# Patient Record
Sex: MALE | Race: WHITE | NOT HISPANIC OR LATINO | Employment: FULL TIME | URBAN - METROPOLITAN AREA
[De-identification: names, ages, dates, MRNs, and addresses within clinical notes are randomized per-mention and may not be internally consistent; named-entity substitution may affect disease eponyms.]

---

## 2017-05-08 ENCOUNTER — RECORDS - HEALTHEAST (OUTPATIENT)
Dept: LAB | Facility: CLINIC | Age: 47
End: 2017-05-08

## 2017-05-08 LAB
CHOLEST SERPL-MCNC: 181 MG/DL
FASTING STATUS PATIENT QL REPORTED: YES
HDLC SERPL-MCNC: 37 MG/DL
LDLC SERPL CALC-MCNC: 115 MG/DL
TRIGL SERPL-MCNC: 143 MG/DL

## 2017-07-31 ENCOUNTER — SURGERY - HEALTHEAST (OUTPATIENT)
Dept: CARDIOLOGY | Facility: CLINIC | Age: 47
End: 2017-07-31

## 2017-07-31 ASSESSMENT — MIFFLIN-ST. JEOR
SCORE: 1963.49
SCORE: 1956.69

## 2017-08-01 ASSESSMENT — MIFFLIN-ST. JEOR: SCORE: 1954.87

## 2017-08-07 ENCOUNTER — AMBULATORY - HEALTHEAST (OUTPATIENT)
Dept: CARDIAC REHAB | Facility: HOSPITAL | Age: 47
End: 2017-08-07

## 2017-08-07 DIAGNOSIS — Z95.5 S/P CORONARY ARTERY STENT PLACEMENT: ICD-10-CM

## 2017-08-07 DIAGNOSIS — I20.0 UNSTABLE ANGINA (H): ICD-10-CM

## 2017-08-07 ASSESSMENT — MIFFLIN-ST. JEOR: SCORE: 1958.95

## 2017-08-09 ENCOUNTER — AMBULATORY - HEALTHEAST (OUTPATIENT)
Dept: CARDIAC REHAB | Facility: HOSPITAL | Age: 47
End: 2017-08-09

## 2017-08-09 DIAGNOSIS — Z95.5 S/P CORONARY ARTERY STENT PLACEMENT: ICD-10-CM

## 2017-08-14 ENCOUNTER — AMBULATORY - HEALTHEAST (OUTPATIENT)
Dept: CARDIAC REHAB | Facility: HOSPITAL | Age: 47
End: 2017-08-14

## 2017-08-14 DIAGNOSIS — Z95.5 S/P CORONARY ARTERY STENT PLACEMENT: ICD-10-CM

## 2017-08-16 ENCOUNTER — AMBULATORY - HEALTHEAST (OUTPATIENT)
Dept: CARDIAC REHAB | Facility: HOSPITAL | Age: 47
End: 2017-08-16

## 2017-08-16 DIAGNOSIS — I20.0 UNSTABLE ANGINA (H): ICD-10-CM

## 2017-08-16 DIAGNOSIS — Z95.5 S/P CORONARY ARTERY STENT PLACEMENT: ICD-10-CM

## 2017-08-18 ENCOUNTER — OFFICE VISIT - HEALTHEAST (OUTPATIENT)
Dept: CARDIOLOGY | Facility: CLINIC | Age: 47
End: 2017-08-18

## 2017-08-18 DIAGNOSIS — E78.5 DYSLIPIDEMIA: ICD-10-CM

## 2017-08-18 DIAGNOSIS — I25.10 CORONARY ARTERY DISEASE: ICD-10-CM

## 2017-08-18 ASSESSMENT — MIFFLIN-ST. JEOR: SCORE: 1940.81

## 2017-08-21 ENCOUNTER — AMBULATORY - HEALTHEAST (OUTPATIENT)
Dept: CARDIAC REHAB | Facility: HOSPITAL | Age: 47
End: 2017-08-21

## 2017-08-21 DIAGNOSIS — Z95.5 S/P CORONARY ARTERY STENT PLACEMENT: ICD-10-CM

## 2017-08-28 ENCOUNTER — AMBULATORY - HEALTHEAST (OUTPATIENT)
Dept: CARDIAC REHAB | Facility: HOSPITAL | Age: 47
End: 2017-08-28

## 2017-08-28 DIAGNOSIS — Z95.5 S/P CORONARY ARTERY STENT PLACEMENT: ICD-10-CM

## 2017-08-30 ENCOUNTER — AMBULATORY - HEALTHEAST (OUTPATIENT)
Dept: CARDIAC REHAB | Facility: HOSPITAL | Age: 47
End: 2017-08-30

## 2017-08-30 DIAGNOSIS — Z95.5 S/P CORONARY ARTERY STENT PLACEMENT: ICD-10-CM

## 2017-09-06 ENCOUNTER — AMBULATORY - HEALTHEAST (OUTPATIENT)
Dept: CARDIAC REHAB | Facility: HOSPITAL | Age: 47
End: 2017-09-06

## 2017-09-06 DIAGNOSIS — Z95.5 S/P CORONARY ARTERY STENT PLACEMENT: ICD-10-CM

## 2017-09-07 ENCOUNTER — RECORDS - HEALTHEAST (OUTPATIENT)
Dept: LAB | Facility: CLINIC | Age: 47
End: 2017-09-07

## 2017-09-07 LAB
CHOLEST SERPL-MCNC: 112 MG/DL
FASTING STATUS PATIENT QL REPORTED: YES
HDLC SERPL-MCNC: 31 MG/DL
LDLC SERPL CALC-MCNC: 63 MG/DL
TRIGL SERPL-MCNC: 88 MG/DL

## 2017-09-08 ENCOUNTER — AMBULATORY - HEALTHEAST (OUTPATIENT)
Dept: CARDIAC REHAB | Facility: HOSPITAL | Age: 47
End: 2017-09-08

## 2017-09-08 DIAGNOSIS — Z95.5 STENTED CORONARY ARTERY: ICD-10-CM

## 2017-09-11 ENCOUNTER — AMBULATORY - HEALTHEAST (OUTPATIENT)
Dept: CARDIAC REHAB | Facility: HOSPITAL | Age: 47
End: 2017-09-11

## 2017-09-11 DIAGNOSIS — Z95.5 STENTED CORONARY ARTERY: ICD-10-CM

## 2017-09-11 DIAGNOSIS — I20.0 UNSTABLE ANGINA (H): ICD-10-CM

## 2017-09-13 ENCOUNTER — AMBULATORY - HEALTHEAST (OUTPATIENT)
Dept: CARDIAC REHAB | Facility: HOSPITAL | Age: 47
End: 2017-09-13

## 2017-09-13 DIAGNOSIS — Z95.5 STENTED CORONARY ARTERY: ICD-10-CM

## 2017-09-18 ENCOUNTER — AMBULATORY - HEALTHEAST (OUTPATIENT)
Dept: CARDIAC REHAB | Facility: HOSPITAL | Age: 47
End: 2017-09-18

## 2017-09-18 DIAGNOSIS — Z95.5 STENTED CORONARY ARTERY: ICD-10-CM

## 2017-09-25 ENCOUNTER — AMBULATORY - HEALTHEAST (OUTPATIENT)
Dept: CARDIAC REHAB | Facility: HOSPITAL | Age: 47
End: 2017-09-25

## 2017-09-25 DIAGNOSIS — Z95.5 STENTED CORONARY ARTERY: ICD-10-CM

## 2017-09-27 ENCOUNTER — AMBULATORY - HEALTHEAST (OUTPATIENT)
Dept: CARDIAC REHAB | Facility: HOSPITAL | Age: 47
End: 2017-09-27

## 2017-09-27 ENCOUNTER — AMBULATORY - HEALTHEAST (OUTPATIENT)
Dept: CARDIOLOGY | Facility: CLINIC | Age: 47
End: 2017-09-27

## 2017-09-27 ENCOUNTER — RECORDS - HEALTHEAST (OUTPATIENT)
Dept: ADMINISTRATIVE | Facility: OTHER | Age: 47
End: 2017-09-27

## 2017-09-27 DIAGNOSIS — Z95.5 STENTED CORONARY ARTERY: ICD-10-CM

## 2017-10-02 ENCOUNTER — AMBULATORY - HEALTHEAST (OUTPATIENT)
Dept: CARDIAC REHAB | Facility: HOSPITAL | Age: 47
End: 2017-10-02

## 2017-10-02 ENCOUNTER — OFFICE VISIT - HEALTHEAST (OUTPATIENT)
Dept: CARDIOLOGY | Facility: CLINIC | Age: 47
End: 2017-10-02

## 2017-10-02 DIAGNOSIS — R00.1 SINUS BRADYCARDIA: ICD-10-CM

## 2017-10-02 DIAGNOSIS — Q23.81 BICUSPID AORTIC VALVE: ICD-10-CM

## 2017-10-02 DIAGNOSIS — I25.10 CORONARY ARTERY DISEASE DUE TO LIPID RICH PLAQUE: ICD-10-CM

## 2017-10-02 DIAGNOSIS — K21.9 GASTROESOPHAGEAL REFLUX DISEASE WITHOUT ESOPHAGITIS: ICD-10-CM

## 2017-10-02 DIAGNOSIS — E78.5 DYSLIPIDEMIA: ICD-10-CM

## 2017-10-02 DIAGNOSIS — Z95.5 STENTED CORONARY ARTERY: ICD-10-CM

## 2017-10-02 DIAGNOSIS — I25.83 CORONARY ARTERY DISEASE DUE TO LIPID RICH PLAQUE: ICD-10-CM

## 2017-10-02 ASSESSMENT — MIFFLIN-ST. JEOR: SCORE: 1913.6

## 2017-10-04 ENCOUNTER — AMBULATORY - HEALTHEAST (OUTPATIENT)
Dept: CARDIAC REHAB | Facility: HOSPITAL | Age: 47
End: 2017-10-04

## 2017-10-04 DIAGNOSIS — Z95.5 STENTED CORONARY ARTERY: ICD-10-CM

## 2017-10-09 ENCOUNTER — AMBULATORY - HEALTHEAST (OUTPATIENT)
Dept: CARDIAC REHAB | Facility: HOSPITAL | Age: 47
End: 2017-10-09

## 2017-10-09 DIAGNOSIS — Z95.5 STENTED CORONARY ARTERY: ICD-10-CM

## 2017-10-09 DIAGNOSIS — I20.0 UNSTABLE ANGINA (H): ICD-10-CM

## 2017-10-16 ENCOUNTER — HOSPITAL ENCOUNTER (OUTPATIENT)
Dept: CARDIOLOGY | Facility: HOSPITAL | Age: 47
Discharge: HOME | End: 2017-10-16
Attending: INTERNAL MEDICINE

## 2017-10-16 ENCOUNTER — HOSPITAL ENCOUNTER (OUTPATIENT)
Dept: NUCLEAR MEDICINE | Facility: HOSPITAL | Age: 47
Discharge: HOME | End: 2017-10-16
Attending: INTERNAL MEDICINE

## 2017-10-16 ENCOUNTER — AMBULATORY - HEALTHEAST (OUTPATIENT)
Dept: CARDIAC REHAB | Facility: HOSPITAL | Age: 47
End: 2017-10-16

## 2017-10-16 DIAGNOSIS — I25.10 CORONARY ARTERY DISEASE DUE TO LIPID RICH PLAQUE: ICD-10-CM

## 2017-10-16 DIAGNOSIS — Z95.5 STENTED CORONARY ARTERY: ICD-10-CM

## 2017-10-16 DIAGNOSIS — I25.83 CORONARY ARTERY DISEASE DUE TO LIPID RICH PLAQUE: ICD-10-CM

## 2017-10-16 LAB
CV STRESS CURRENT BP HE: NORMAL
CV STRESS CURRENT HR HE: 100
CV STRESS CURRENT HR HE: 100
CV STRESS CURRENT HR HE: 102
CV STRESS CURRENT HR HE: 103
CV STRESS CURRENT HR HE: 110
CV STRESS CURRENT HR HE: 114
CV STRESS CURRENT HR HE: 118
CV STRESS CURRENT HR HE: 118
CV STRESS CURRENT HR HE: 119
CV STRESS CURRENT HR HE: 132
CV STRESS CURRENT HR HE: 133
CV STRESS CURRENT HR HE: 135
CV STRESS CURRENT HR HE: 145
CV STRESS CURRENT HR HE: 145
CV STRESS CURRENT HR HE: 146
CV STRESS CURRENT HR HE: 149
CV STRESS CURRENT HR HE: 152
CV STRESS CURRENT HR HE: 153
CV STRESS CURRENT HR HE: 153
CV STRESS CURRENT HR HE: 154
CV STRESS CURRENT HR HE: 78
CV STRESS CURRENT HR HE: 79
CV STRESS CURRENT HR HE: 89
CV STRESS CURRENT HR HE: 92
CV STRESS CURRENT HR HE: 93
CV STRESS CURRENT HR HE: 95
CV STRESS CURRENT HR HE: 96
CV STRESS CURRENT HR HE: 96
CV STRESS CURRENT HR HE: 98
CV STRESS DEVIATION TIME HE: NORMAL
CV STRESS ECHO PERCENT HR HE: NORMAL
CV STRESS EXERCISE STAGE HE: NORMAL
CV STRESS EXERCISE STAGE REACHED HE: NORMAL
CV STRESS FINAL RESTING BP HE: NORMAL
CV STRESS FINAL RESTING HR HE: 93
CV STRESS MAX HR HE: 154
CV STRESS MAX TREADMILL GRADE HE: 16
CV STRESS MAX TREADMILL SPEED HE: 4.2
CV STRESS PEAK DIA BP HE: NORMAL
CV STRESS PEAK SYS BP HE: NORMAL
CV STRESS PHASE HE: NORMAL
CV STRESS PROTOCOL HE: NORMAL
CV STRESS RESTING PT POSITION HE: NORMAL
CV STRESS RESTING PT POSITION HE: NORMAL
CV STRESS ST DEVIATION AMOUNT HE: NORMAL
CV STRESS ST DEVIATION ELEVATION HE: NORMAL
CV STRESS ST EVELATION AMOUNT HE: NORMAL
CV STRESS TEST TYPE HE: NORMAL
CV STRESS TOTAL STAGE TIME MIN 1 HE: NORMAL
NUC STRESS EJECTION FRACTION: 58 %
STRESS ECHO BASELINE BP: NORMAL
STRESS ECHO BASELINE HR: 79
STRESS ECHO CALCULATED PERCENT HR: 90 %
STRESS ECHO LAST STRESS BP: NORMAL
STRESS ECHO LAST STRESS HR: 154
STRESS ECHO POST ESTIMATED WORKLOAD: 10.7
STRESS ECHO POST EXERCISE DUR MIN: 9
STRESS ECHO POST EXERCISE DUR SEC: 15
STRESS ECHO TARGET HR: 146

## 2017-10-18 ENCOUNTER — AMBULATORY - HEALTHEAST (OUTPATIENT)
Dept: CARDIAC REHAB | Facility: HOSPITAL | Age: 47
End: 2017-10-18

## 2017-10-18 DIAGNOSIS — Z95.5 STENTED CORONARY ARTERY: ICD-10-CM

## 2017-10-23 ENCOUNTER — AMBULATORY - HEALTHEAST (OUTPATIENT)
Dept: CARDIAC REHAB | Facility: HOSPITAL | Age: 47
End: 2017-10-23

## 2017-10-23 DIAGNOSIS — Z95.5 STENTED CORONARY ARTERY: ICD-10-CM

## 2017-10-25 ENCOUNTER — AMBULATORY - HEALTHEAST (OUTPATIENT)
Dept: CARDIAC REHAB | Facility: HOSPITAL | Age: 47
End: 2017-10-25

## 2017-10-25 DIAGNOSIS — Z95.5 STENTED CORONARY ARTERY: ICD-10-CM

## 2017-10-30 ENCOUNTER — AMBULATORY - HEALTHEAST (OUTPATIENT)
Dept: CARDIAC REHAB | Facility: HOSPITAL | Age: 47
End: 2017-10-30

## 2017-10-30 DIAGNOSIS — Z95.5 STENTED CORONARY ARTERY: ICD-10-CM

## 2017-11-01 ENCOUNTER — AMBULATORY - HEALTHEAST (OUTPATIENT)
Dept: CARDIAC REHAB | Facility: HOSPITAL | Age: 47
End: 2017-11-01

## 2017-11-01 DIAGNOSIS — Z95.5 STENTED CORONARY ARTERY: ICD-10-CM

## 2017-11-13 ENCOUNTER — AMBULATORY - HEALTHEAST (OUTPATIENT)
Dept: CARDIAC REHAB | Facility: HOSPITAL | Age: 47
End: 2017-11-13

## 2017-11-13 DIAGNOSIS — Z95.5 STENTED CORONARY ARTERY: ICD-10-CM

## 2017-11-15 ENCOUNTER — AMBULATORY - HEALTHEAST (OUTPATIENT)
Dept: CARDIAC REHAB | Facility: HOSPITAL | Age: 47
End: 2017-11-15

## 2017-11-15 DIAGNOSIS — Z95.5 STENTED CORONARY ARTERY: ICD-10-CM

## 2017-11-20 ENCOUNTER — AMBULATORY - HEALTHEAST (OUTPATIENT)
Dept: CARDIAC REHAB | Facility: HOSPITAL | Age: 47
End: 2017-11-20

## 2017-11-20 DIAGNOSIS — Z95.5 STENTED CORONARY ARTERY: ICD-10-CM

## 2017-11-27 ENCOUNTER — AMBULATORY - HEALTHEAST (OUTPATIENT)
Dept: CARDIAC REHAB | Facility: HOSPITAL | Age: 47
End: 2017-11-27

## 2017-11-27 DIAGNOSIS — Z95.5 STENTED CORONARY ARTERY: ICD-10-CM

## 2017-12-01 ENCOUNTER — AMBULATORY - HEALTHEAST (OUTPATIENT)
Dept: CARDIAC REHAB | Facility: HOSPITAL | Age: 47
End: 2017-12-01

## 2017-12-01 DIAGNOSIS — Z95.5 STENTED CORONARY ARTERY: ICD-10-CM

## 2017-12-04 ENCOUNTER — AMBULATORY - HEALTHEAST (OUTPATIENT)
Dept: CARDIAC REHAB | Facility: HOSPITAL | Age: 47
End: 2017-12-04

## 2017-12-04 DIAGNOSIS — Z95.5 STENTED CORONARY ARTERY: ICD-10-CM

## 2018-02-02 ENCOUNTER — OFFICE VISIT - HEALTHEAST (OUTPATIENT)
Dept: CARDIOLOGY | Facility: CLINIC | Age: 48
End: 2018-02-02

## 2018-02-02 ENCOUNTER — COMMUNICATION - HEALTHEAST (OUTPATIENT)
Dept: TELEHEALTH | Facility: CLINIC | Age: 48
End: 2018-02-02

## 2018-02-02 DIAGNOSIS — E66.9 OBESITY (BMI 35.0-39.9 WITHOUT COMORBIDITY): ICD-10-CM

## 2018-02-02 DIAGNOSIS — R00.1 SINUS BRADYCARDIA: ICD-10-CM

## 2018-02-02 DIAGNOSIS — I25.83 CORONARY ARTERY DISEASE DUE TO LIPID RICH PLAQUE: ICD-10-CM

## 2018-02-02 DIAGNOSIS — E78.5 DYSLIPIDEMIA: ICD-10-CM

## 2018-02-02 DIAGNOSIS — I25.10 CORONARY ARTERY DISEASE DUE TO LIPID RICH PLAQUE: ICD-10-CM

## 2018-02-02 DIAGNOSIS — Q23.81 BICUSPID AORTIC VALVE: ICD-10-CM

## 2018-02-02 ASSESSMENT — MIFFLIN-ST. JEOR: SCORE: 1927.2

## 2018-02-09 ENCOUNTER — HOSPITAL ENCOUNTER (OUTPATIENT)
Dept: CARDIOLOGY | Facility: HOSPITAL | Age: 48
Discharge: HOME | End: 2018-02-09
Attending: INTERNAL MEDICINE

## 2018-02-09 DIAGNOSIS — I25.83 CORONARY ARTERY DISEASE DUE TO LIPID RICH PLAQUE: ICD-10-CM

## 2018-02-09 DIAGNOSIS — Q23.81 BICUSPID AORTIC VALVE: ICD-10-CM

## 2018-02-09 DIAGNOSIS — I25.10 CORONARY ARTERY DISEASE DUE TO LIPID RICH PLAQUE: ICD-10-CM

## 2018-02-09 ASSESSMENT — MIFFLIN-ST. JEOR: SCORE: 1927.2

## 2018-02-10 LAB
AORTIC ROOT: 3.5 CM
AORTIC VALVE MEAN VELOCITY: 141 CM/S
AR DECEL SLOPE: 1970 MM/S2
AR PEAK VELOCITY: 420 CM/S
AV DIMENSIONLESS INDEX VTI: 0.5
AV MEAN GRADIENT: 9 MMHG
AV PEAK GRADIENT: 16.3 MMHG
AV REGURGITANT PEAK GRADIENT: 70.6 MMHG
AV REGURGITATION PRESSURE HALF TIME: 627 MS
AV VALVE AREA: 1.4 CM2
AV VELOCITY RATIO: 0.5
BSA FOR ECHO PROCEDURE: 2.29 M2
CV BLOOD PRESSURE: NORMAL MMHG
CV ECHO HEIGHT: 70 IN
CV ECHO WEIGHT: 235 LBS
DOP CALC AO PEAK VEL: 202 CM/S
DOP CALC AO VTI: 43.4 CM
DOP CALC LVOT AREA: 3.14 CM2
DOP CALC LVOT DIAMETER: 2 CM
DOP CALC LVOT PEAK VEL: 97.5 CM/S
DOP CALC LVOT STROKE VOLUME: 61.9 CM3
DOP CALCLVOT PEAK VEL VTI: 19.7 CM
EJECTION FRACTION: 62 % (ref 55–75)
FRACTIONAL SHORTENING: 25.3 % (ref 28–44)
INTERVENTRICULAR SEPTUM IN END DIASTOLE: 1.12 CM (ref 0.6–1)
IVS/PW RATIO: 1
LA AREA 1: 18.6 CM2
LA AREA 2: 22.6 CM2
LEFT ATRIUM LENGTH: 5.6 CM
LEFT ATRIUM SIZE: 3.8 CM
LEFT ATRIUM TO AORTIC ROOT RATIO: 1.09 NO UNITS
LEFT ATRIUM VOLUME INDEX: 27.9 ML/M2
LEFT ATRIUM VOLUME: 63.8 ML
LEFT VENTRICLE CARDIAC INDEX: 1.6 L/MIN/M2
LEFT VENTRICLE CARDIAC OUTPUT: 3.7 L/MIN
LEFT VENTRICLE DIASTOLIC VOLUME INDEX: 55 CM3/M2 (ref 34–74)
LEFT VENTRICLE DIASTOLIC VOLUME: 126 CM3 (ref 62–150)
LEFT VENTRICLE HEART RATE: 60 BPM
LEFT VENTRICLE MASS INDEX: 80.7 G/M2
LEFT VENTRICLE SYSTOLIC VOLUME INDEX: 21.2 CM3/M2 (ref 11–31)
LEFT VENTRICLE SYSTOLIC VOLUME: 48.5 CM3 (ref 21–61)
LEFT VENTRICULAR INTERNAL DIMENSION IN DIASTOLE: 4.62 CM (ref 4.2–5.8)
LEFT VENTRICULAR INTERNAL DIMENSION IN SYSTOLE: 3.45 CM (ref 2.5–4)
LEFT VENTRICULAR MASS: 184.8 G
LEFT VENTRICULAR OUTFLOW TRACT MEAN GRADIENT: 2 MMHG
LEFT VENTRICULAR OUTFLOW TRACT MEAN VELOCITY: 64.2 CM/S
LEFT VENTRICULAR OUTFLOW TRACT PEAK GRADIENT: 4 MMHG
LEFT VENTRICULAR POSTERIOR WALL IN END DIASTOLE: 1.1 CM (ref 0.6–1)
LV STROKE VOLUME INDEX: 27 ML/M2
MITRAL VALVE DECELERATION SLOPE: 2380 MM/S2
MITRAL VALVE E/A RATIO: 1.2
MITRAL VALVE PRESSURE HALF-TIME: 80 MS
MV AVERAGE E/E' RATIO: 6.4 CM/S
MV DECELERATION TIME: 275 MS
MV E'TISSUE VEL-LAT: 12.3 CM/S
MV E'TISSUE VEL-MED: 8.12 CM/S
MV LATERAL E/E' RATIO: 5.3
MV MEDIAL E/E' RATIO: 8.1
MV PEAK A VELOCITY: 53.4 CM/S
MV PEAK E VELOCITY: 65.5 CM/S
MV VALVE AREA PRESSURE 1/2 METHOD: 2.8 CM2
NUC REST DIASTOLIC VOLUME INDEX: 3760 LBS
NUC REST SYSTOLIC VOLUME INDEX: 70 IN
TRICUSPID VALVE ANULAR PLANE SYSTOLIC EXCURSION: 2.2 CM

## 2018-02-23 ENCOUNTER — AMBULATORY - HEALTHEAST (OUTPATIENT)
Dept: CARDIOLOGY | Facility: CLINIC | Age: 48
End: 2018-02-23

## 2018-02-23 DIAGNOSIS — I25.10 CORONARY ARTERY DISEASE DUE TO LIPID RICH PLAQUE: ICD-10-CM

## 2018-02-23 DIAGNOSIS — R07.9 CHEST PAIN: ICD-10-CM

## 2018-02-23 DIAGNOSIS — I25.83 CORONARY ARTERY DISEASE DUE TO LIPID RICH PLAQUE: ICD-10-CM

## 2018-04-05 ENCOUNTER — RECORDS - HEALTHEAST (OUTPATIENT)
Dept: LAB | Facility: CLINIC | Age: 48
End: 2018-04-05

## 2018-04-05 LAB
ALBUMIN SERPL-MCNC: 4 G/DL (ref 3.5–5)
ALP SERPL-CCNC: 125 U/L (ref 45–120)
ALT SERPL W P-5'-P-CCNC: 62 U/L (ref 0–45)
ANION GAP SERPL CALCULATED.3IONS-SCNC: 8 MMOL/L (ref 5–18)
AST SERPL W P-5'-P-CCNC: 30 U/L (ref 0–40)
BILIRUB SERPL-MCNC: 1.3 MG/DL (ref 0–1)
BUN SERPL-MCNC: 20 MG/DL (ref 8–22)
CALCIUM SERPL-MCNC: 9.6 MG/DL (ref 8.5–10.5)
CHLORIDE BLD-SCNC: 107 MMOL/L (ref 98–107)
CHOLEST SERPL-MCNC: 129 MG/DL
CO2 SERPL-SCNC: 26 MMOL/L (ref 22–31)
CREAT SERPL-MCNC: 1.03 MG/DL (ref 0.7–1.3)
FASTING STATUS PATIENT QL REPORTED: YES
GFR SERPL CREATININE-BSD FRML MDRD: >60 ML/MIN/1.73M2
GLUCOSE BLD-MCNC: 98 MG/DL (ref 70–125)
HDLC SERPL-MCNC: 39 MG/DL
LDLC SERPL CALC-MCNC: 73 MG/DL
POTASSIUM BLD-SCNC: 5.1 MMOL/L (ref 3.5–5)
PROT SERPL-MCNC: 6.8 G/DL (ref 6–8)
SODIUM SERPL-SCNC: 141 MMOL/L (ref 136–145)
TRIGL SERPL-MCNC: 83 MG/DL

## 2018-06-14 ENCOUNTER — RECORDS - HEALTHEAST (OUTPATIENT)
Dept: LAB | Facility: CLINIC | Age: 48
End: 2018-06-14

## 2018-06-15 LAB
B BURGDOR IGG+IGM SER QL: 0.05 INDEX VALUE
T4 FREE SERPL-MCNC: 1 NG/DL (ref 0.7–1.8)
TSH SERPL DL<=0.005 MIU/L-ACNC: 2.64 UIU/ML (ref 0.3–5)

## 2018-07-09 ENCOUNTER — COMMUNICATION - HEALTHEAST (OUTPATIENT)
Dept: CARDIOLOGY | Facility: CLINIC | Age: 48
End: 2018-07-09

## 2018-07-09 DIAGNOSIS — I25.10 CORONARY ARTERY DISEASE DUE TO CALCIFIED CORONARY LESION: ICD-10-CM

## 2018-07-09 DIAGNOSIS — I25.84 CORONARY ARTERY DISEASE DUE TO CALCIFIED CORONARY LESION: ICD-10-CM

## 2018-08-07 ENCOUNTER — COMMUNICATION - HEALTHEAST (OUTPATIENT)
Dept: CARDIOLOGY | Facility: CLINIC | Age: 48
End: 2018-08-07

## 2018-08-07 DIAGNOSIS — I25.10 CORONARY ARTERY DISEASE DUE TO LIPID RICH PLAQUE: ICD-10-CM

## 2018-08-07 DIAGNOSIS — I25.83 CORONARY ARTERY DISEASE DUE TO LIPID RICH PLAQUE: ICD-10-CM

## 2018-08-09 ENCOUNTER — COMMUNICATION - HEALTHEAST (OUTPATIENT)
Dept: CARDIOLOGY | Facility: CLINIC | Age: 48
End: 2018-08-09

## 2018-08-30 ENCOUNTER — RECORDS - HEALTHEAST (OUTPATIENT)
Dept: ADMINISTRATIVE | Facility: OTHER | Age: 48
End: 2018-08-30

## 2018-08-30 ENCOUNTER — AMBULATORY - HEALTHEAST (OUTPATIENT)
Dept: CARDIOLOGY | Facility: CLINIC | Age: 48
End: 2018-08-30

## 2018-09-05 ENCOUNTER — OFFICE VISIT - HEALTHEAST (OUTPATIENT)
Dept: CARDIOLOGY | Facility: CLINIC | Age: 48
End: 2018-09-05

## 2018-09-05 DIAGNOSIS — E78.5 DYSLIPIDEMIA: ICD-10-CM

## 2018-09-05 DIAGNOSIS — I25.10 CORONARY ARTERY DISEASE DUE TO LIPID RICH PLAQUE: ICD-10-CM

## 2018-09-05 DIAGNOSIS — I25.83 CORONARY ARTERY DISEASE DUE TO LIPID RICH PLAQUE: ICD-10-CM

## 2018-09-05 DIAGNOSIS — R00.1 SINUS BRADYCARDIA: ICD-10-CM

## 2018-09-05 DIAGNOSIS — Q23.81 BICUSPID AORTIC VALVE: ICD-10-CM

## 2018-09-05 DIAGNOSIS — I10 ESSENTIAL HYPERTENSION: ICD-10-CM

## 2018-09-05 ASSESSMENT — MIFFLIN-ST. JEOR: SCORE: 1895.45

## 2019-02-03 ENCOUNTER — COMMUNICATION - HEALTHEAST (OUTPATIENT)
Dept: CARDIOLOGY | Facility: CLINIC | Age: 49
End: 2019-02-03

## 2019-02-03 DIAGNOSIS — I25.10 CORONARY ARTERY DISEASE DUE TO LIPID RICH PLAQUE: ICD-10-CM

## 2019-02-03 DIAGNOSIS — I25.83 CORONARY ARTERY DISEASE DUE TO LIPID RICH PLAQUE: ICD-10-CM

## 2019-07-05 ENCOUNTER — AMBULATORY - HEALTHEAST (OUTPATIENT)
Dept: CARDIOLOGY | Facility: CLINIC | Age: 49
End: 2019-07-05

## 2019-07-05 ENCOUNTER — RECORDS - HEALTHEAST (OUTPATIENT)
Dept: ADMINISTRATIVE | Facility: OTHER | Age: 49
End: 2019-07-05

## 2019-07-18 ENCOUNTER — OFFICE VISIT - HEALTHEAST (OUTPATIENT)
Dept: CARDIOLOGY | Facility: CLINIC | Age: 49
End: 2019-07-18

## 2019-07-18 DIAGNOSIS — E78.2 MIXED HYPERLIPIDEMIA: ICD-10-CM

## 2019-07-18 DIAGNOSIS — Q23.81 BICUSPID AORTIC VALVE: ICD-10-CM

## 2019-07-18 DIAGNOSIS — E66.9 OBESITY (BMI 35.0-39.9 WITHOUT COMORBIDITY): ICD-10-CM

## 2019-07-18 DIAGNOSIS — I10 PRIMARY HYPERTENSION: ICD-10-CM

## 2019-07-18 DIAGNOSIS — I25.83 CORONARY ARTERY DISEASE DUE TO LIPID RICH PLAQUE: ICD-10-CM

## 2019-07-18 DIAGNOSIS — I25.10 CORONARY ARTERY DISEASE DUE TO LIPID RICH PLAQUE: ICD-10-CM

## 2019-07-18 ASSESSMENT — MIFFLIN-ST. JEOR: SCORE: 1927.2

## 2019-08-08 ENCOUNTER — COMMUNICATION - HEALTHEAST (OUTPATIENT)
Dept: CARDIOLOGY | Facility: CLINIC | Age: 49
End: 2019-08-08

## 2019-08-08 DIAGNOSIS — I25.83 CORONARY ARTERY DISEASE DUE TO LIPID RICH PLAQUE: ICD-10-CM

## 2019-08-08 DIAGNOSIS — I10 PRIMARY HYPERTENSION: ICD-10-CM

## 2019-08-08 DIAGNOSIS — I25.10 CORONARY ARTERY DISEASE DUE TO LIPID RICH PLAQUE: ICD-10-CM

## 2019-08-09 ENCOUNTER — AMBULATORY - HEALTHEAST (OUTPATIENT)
Dept: CARDIOLOGY | Facility: CLINIC | Age: 49
End: 2019-08-09

## 2019-08-12 ENCOUNTER — COMMUNICATION - HEALTHEAST (OUTPATIENT)
Dept: CARDIOLOGY | Facility: CLINIC | Age: 49
End: 2019-08-12

## 2019-08-12 DIAGNOSIS — I10 ESSENTIAL HYPERTENSION: ICD-10-CM

## 2019-09-09 ENCOUNTER — RECORDS - HEALTHEAST (OUTPATIENT)
Dept: LAB | Facility: CLINIC | Age: 49
End: 2019-09-09

## 2019-09-09 LAB
ALBUMIN SERPL-MCNC: 4.1 G/DL (ref 3.5–5)
ALP SERPL-CCNC: 113 U/L (ref 45–120)
ALT SERPL W P-5'-P-CCNC: 61 U/L (ref 0–45)
ANION GAP SERPL CALCULATED.3IONS-SCNC: 9 MMOL/L (ref 5–18)
AST SERPL W P-5'-P-CCNC: 22 U/L (ref 0–40)
BILIRUB SERPL-MCNC: 1.1 MG/DL (ref 0–1)
BUN SERPL-MCNC: 21 MG/DL (ref 8–22)
CALCIUM SERPL-MCNC: 9.8 MG/DL (ref 8.5–10.5)
CHLORIDE BLD-SCNC: 107 MMOL/L (ref 98–107)
CHOLEST SERPL-MCNC: 146 MG/DL
CO2 SERPL-SCNC: 26 MMOL/L (ref 22–31)
CREAT SERPL-MCNC: 1.16 MG/DL (ref 0.7–1.3)
FASTING STATUS PATIENT QL REPORTED: YES
GFR SERPL CREATININE-BSD FRML MDRD: >60 ML/MIN/1.73M2
GLUCOSE BLD-MCNC: 96 MG/DL (ref 70–125)
HDLC SERPL-MCNC: 44 MG/DL
LDLC SERPL CALC-MCNC: 79 MG/DL
POTASSIUM BLD-SCNC: 4.8 MMOL/L (ref 3.5–5)
PROT SERPL-MCNC: 6.7 G/DL (ref 6–8)
PSA SERPL-MCNC: 2.1 NG/ML (ref 0–3.5)
SODIUM SERPL-SCNC: 142 MMOL/L (ref 136–145)
TRIGL SERPL-MCNC: 113 MG/DL

## 2019-10-24 ENCOUNTER — RECORDS - HEALTHEAST (OUTPATIENT)
Dept: ADMINISTRATIVE | Facility: OTHER | Age: 49
End: 2019-10-24

## 2019-10-24 ENCOUNTER — AMBULATORY - HEALTHEAST (OUTPATIENT)
Dept: CARDIOLOGY | Facility: CLINIC | Age: 49
End: 2019-10-24

## 2019-10-28 ENCOUNTER — OFFICE VISIT - HEALTHEAST (OUTPATIENT)
Dept: CARDIOLOGY | Facility: CLINIC | Age: 49
End: 2019-10-28

## 2019-10-28 DIAGNOSIS — I10 PRIMARY HYPERTENSION: ICD-10-CM

## 2019-10-28 DIAGNOSIS — I25.83 CORONARY ARTERY DISEASE DUE TO LIPID RICH PLAQUE: ICD-10-CM

## 2019-10-28 DIAGNOSIS — Q23.81 BICUSPID AORTIC VALVE: ICD-10-CM

## 2019-10-28 DIAGNOSIS — E78.2 MIXED HYPERLIPIDEMIA: ICD-10-CM

## 2019-10-28 DIAGNOSIS — I25.10 CORONARY ARTERY DISEASE DUE TO LIPID RICH PLAQUE: ICD-10-CM

## 2020-01-24 ENCOUNTER — HOSPITAL ENCOUNTER (OUTPATIENT)
Dept: CARDIOLOGY | Facility: HOSPITAL | Age: 50
Discharge: HOME | End: 2020-01-24
Attending: INTERNAL MEDICINE

## 2020-01-24 ENCOUNTER — COMMUNICATION - HEALTHEAST (OUTPATIENT)
Dept: CARDIOLOGY | Facility: CLINIC | Age: 50
End: 2020-01-24

## 2020-01-24 ENCOUNTER — AMBULATORY - HEALTHEAST (OUTPATIENT)
Dept: LAB | Facility: HOSPITAL | Age: 50
End: 2020-01-24

## 2020-01-24 DIAGNOSIS — Q23.81 BICUSPID AORTIC VALVE: ICD-10-CM

## 2020-01-24 DIAGNOSIS — E78.2 MIXED HYPERLIPIDEMIA: ICD-10-CM

## 2020-01-24 LAB
ALT SERPL W P-5'-P-CCNC: 32 U/L (ref 0–45)
AORTIC ROOT: 3.7 CM
AORTIC VALVE MEAN VELOCITY: 139 CM/S
AR DECEL SLOPE: 1370 MM/S2
AR PEAK VELOCITY: 434 CM/S
AST SERPL W P-5'-P-CCNC: 21 U/L (ref 0–40)
AV AR END DIASTOLIC VELOCITY: 307 CM/S
AV AR END DIASTOLIC VELOCITY: 324 CM/S
AV AR END DIASTOLIC VELOCITY: 341 CM/S
AV CUSP SEPERATION: 2.2 CM
AV CUSP SEPERATION: 2.2 CM
AV DIMENSIONLESS INDEX VTI: 0.5
AV MEAN GRADIENT: 9 MMHG
AV PEAK GRADIENT: 16.3 MMHG
AV REGURGITANT PEAK GRADIENT: 75.3 MMHG
AV REGURGITATION PRESSURE HALF TIME: 927 MS
AV VALVE AREA: 1.6 CM2
BILIRUB SERPL-MCNC: 1.3 MG/DL (ref 0–1)
BSA FOR ECHO PROCEDURE: 2.33 M2
CHOLEST SERPL-MCNC: 133 MG/DL
CV ECHO HEIGHT: 70 IN
CV ECHO WEIGHT: 242 LBS
DOP CALC AO PEAK VEL: 202 CM/S
DOP CALC AO VTI: 46.6 CM
DOP CALC LVOT AREA: 3.46 CM2
DOP CALC LVOT DIAMETER: 2.1 CM
DOP CALC LVOT STROKE VOLUME: 76.2 CM3
DOP CALC MV VTI: 34.1 CM
DOP CALCLVOT PEAK VEL VTI: 22 CM
EJECTION FRACTION: 67 % (ref 55–75)
FASTING STATUS PATIENT QL REPORTED: YES
FRACTIONAL SHORTENING: 27.1 % (ref 28–44)
HDLC SERPL-MCNC: 41 MG/DL
INTERVENTRICULAR SEPTUM IN END DIASTOLE: 1.3 CM (ref 0.6–1)
IVS/PW RATIO: 1
LA AREA 1: 22.6 CM2
LA AREA 2: 24.9 CM2
LDLC SERPL CALC-MCNC: 74 MG/DL
LEFT ATRIUM LENGTH: 5.72 CM
LEFT ATRIUM SIZE: 3.9 CM
LEFT ATRIUM VOLUME INDEX: 35.9 ML/M2
LEFT ATRIUM VOLUME: 83.6 ML
LEFT VENTRICLE CARDIAC INDEX: 1.6 L/MIN/M2
LEFT VENTRICLE CARDIAC OUTPUT: 3.7 L/MIN
LEFT VENTRICLE DIASTOLIC VOLUME INDEX: 49.4 CM3/M2 (ref 34–74)
LEFT VENTRICLE DIASTOLIC VOLUME: 115 CM3 (ref 62–150)
LEFT VENTRICLE HEART RATE: 48 BPM
LEFT VENTRICLE MASS INDEX: 105.5 G/M2
LEFT VENTRICLE SYSTOLIC VOLUME INDEX: 16.3 CM3/M2 (ref 11–31)
LEFT VENTRICLE SYSTOLIC VOLUME: 38 CM3 (ref 21–61)
LEFT VENTRICULAR INTERNAL DIMENSION IN DIASTOLE: 4.8 CM (ref 4.2–5.8)
LEFT VENTRICULAR INTERNAL DIMENSION IN SYSTOLE: 3.5 CM (ref 2.5–4)
LEFT VENTRICULAR MASS: 245.7 G
LEFT VENTRICULAR OUTFLOW TRACT MEAN GRADIENT: 2 MMHG
LEFT VENTRICULAR OUTFLOW TRACT MEAN VELOCITY: 69 CM/S
LEFT VENTRICULAR POSTERIOR WALL IN END DIASTOLE: 1.3 CM (ref 0.6–1)
LV STROKE VOLUME INDEX: 32.7 ML/M2
MITRAL VALVE DECELERATION SLOPE: 3120 MM/S2
MITRAL VALVE E/A RATIO: 1.3
MITRAL VALVE MEAN INFLOW VELOCITY: 44.7 CM/S
MITRAL VALVE PEAK VELOCITY: 84.5 CM/S
MITRAL VALVE PRESSURE HALF-TIME: 84 MS
MV AREA VTI: 2.23 CM2
MV AVERAGE E/E' RATIO: 10.5 CM/S
MV DECELERATION TIME: 246 MS
MV E'TISSUE VEL-LAT: 9.07 CM/S
MV E'TISSUE VEL-MED: 7.31 CM/S
MV LATERAL E/E' RATIO: 9.5
MV MEAN GRADIENT: 1 MMHG
MV MEDIAL E/E' RATIO: 11.8
MV PEAK A VELOCITY: 67.1 CM/S
MV PEAK E VELOCITY: 85.9 CM/S
MV PEAK GRADIENT: 2.9 MMHG
MV VALVE AREA BY CONTINUITY EQUATION: 2.2 CM2
MV VALVE AREA PRESSURE 1/2 METHOD: 2.6 CM2
NUC REST DIASTOLIC VOLUME INDEX: 3872 LBS
NUC REST SYSTOLIC VOLUME INDEX: 70 IN
TRICUSPID VALVE ANULAR PLANE SYSTOLIC EXCURSION: 2.9 CM
TRIGL SERPL-MCNC: 90 MG/DL

## 2020-01-24 ASSESSMENT — MIFFLIN-ST. JEOR: SCORE: 1958.95

## 2020-06-01 ENCOUNTER — RECORDS - HEALTHEAST (OUTPATIENT)
Dept: LAB | Facility: CLINIC | Age: 50
End: 2020-06-01

## 2020-06-01 LAB
ALBUMIN SERPL-MCNC: 4 G/DL (ref 3.5–5)
ALP SERPL-CCNC: 100 U/L (ref 45–120)
ALT SERPL W P-5'-P-CCNC: 30 U/L (ref 0–45)
ANION GAP SERPL CALCULATED.3IONS-SCNC: 7 MMOL/L (ref 5–18)
AST SERPL W P-5'-P-CCNC: 17 U/L (ref 0–40)
BILIRUB SERPL-MCNC: 1.3 MG/DL (ref 0–1)
BUN SERPL-MCNC: 25 MG/DL (ref 8–22)
CALCIUM SERPL-MCNC: 9.2 MG/DL (ref 8.5–10.5)
CHLORIDE BLD-SCNC: 107 MMOL/L (ref 98–107)
CO2 SERPL-SCNC: 27 MMOL/L (ref 22–31)
CREAT SERPL-MCNC: 1.04 MG/DL (ref 0.7–1.3)
GFR SERPL CREATININE-BSD FRML MDRD: >60 ML/MIN/1.73M2
GLUCOSE BLD-MCNC: 78 MG/DL (ref 70–125)
POTASSIUM BLD-SCNC: 4.6 MMOL/L (ref 3.5–5)
PROT SERPL-MCNC: 6.7 G/DL (ref 6–8)
SODIUM SERPL-SCNC: 141 MMOL/L (ref 136–145)

## 2020-12-23 ENCOUNTER — RECORDS - HEALTHEAST (OUTPATIENT)
Dept: LAB | Facility: CLINIC | Age: 50
End: 2020-12-23

## 2020-12-23 LAB
ALBUMIN SERPL-MCNC: 4.1 G/DL (ref 3.5–5)
ALP SERPL-CCNC: 100 U/L (ref 45–120)
ALT SERPL W P-5'-P-CCNC: 41 U/L (ref 0–45)
ANION GAP SERPL CALCULATED.3IONS-SCNC: 9 MMOL/L (ref 5–18)
AST SERPL W P-5'-P-CCNC: 24 U/L (ref 0–40)
BILIRUB SERPL-MCNC: 1.2 MG/DL (ref 0–1)
BUN SERPL-MCNC: 25 MG/DL (ref 8–22)
CALCIUM SERPL-MCNC: 9.2 MG/DL (ref 8.5–10.5)
CHLORIDE BLD-SCNC: 109 MMOL/L (ref 98–107)
CHOLEST SERPL-MCNC: 123 MG/DL
CO2 SERPL-SCNC: 23 MMOL/L (ref 22–31)
CREAT SERPL-MCNC: 1.04 MG/DL (ref 0.7–1.3)
FASTING STATUS PATIENT QL REPORTED: YES
GFR SERPL CREATININE-BSD FRML MDRD: >60 ML/MIN/1.73M2
GLUCOSE BLD-MCNC: 103 MG/DL (ref 70–125)
HDLC SERPL-MCNC: 36 MG/DL
LDLC SERPL CALC-MCNC: 59 MG/DL
POTASSIUM BLD-SCNC: 5 MMOL/L (ref 3.5–5)
PROT SERPL-MCNC: 6.6 G/DL (ref 6–8)
SODIUM SERPL-SCNC: 141 MMOL/L (ref 136–145)
TRIGL SERPL-MCNC: 138 MG/DL

## 2021-01-15 ENCOUNTER — RECORDS - HEALTHEAST (OUTPATIENT)
Dept: ADMINISTRATIVE | Facility: OTHER | Age: 51
End: 2021-01-15

## 2021-01-15 ENCOUNTER — AMBULATORY - HEALTHEAST (OUTPATIENT)
Dept: CARDIOLOGY | Facility: CLINIC | Age: 51
End: 2021-01-15

## 2021-01-29 ENCOUNTER — COMMUNICATION - HEALTHEAST (OUTPATIENT)
Dept: CARDIOLOGY | Facility: CLINIC | Age: 51
End: 2021-01-29

## 2021-02-01 ENCOUNTER — OFFICE VISIT - HEALTHEAST (OUTPATIENT)
Dept: CARDIOLOGY | Facility: CLINIC | Age: 51
End: 2021-02-01

## 2021-02-01 DIAGNOSIS — E66.9 OBESITY (BMI 35.0-39.9 WITHOUT COMORBIDITY): ICD-10-CM

## 2021-02-01 DIAGNOSIS — I25.10 CORONARY ARTERY DISEASE DUE TO LIPID RICH PLAQUE: ICD-10-CM

## 2021-02-01 DIAGNOSIS — Q23.81 BICUSPID AORTIC VALVE: ICD-10-CM

## 2021-02-01 DIAGNOSIS — E78.2 MIXED HYPERLIPIDEMIA: ICD-10-CM

## 2021-02-01 DIAGNOSIS — I10 PRIMARY HYPERTENSION: ICD-10-CM

## 2021-02-01 DIAGNOSIS — I25.83 CORONARY ARTERY DISEASE DUE TO LIPID RICH PLAQUE: ICD-10-CM

## 2021-02-01 ASSESSMENT — MIFFLIN-ST. JEOR: SCORE: 1986.17

## 2021-02-23 ENCOUNTER — HOSPITAL ENCOUNTER (OUTPATIENT)
Dept: NUCLEAR MEDICINE | Facility: HOSPITAL | Age: 51
Discharge: HOME | End: 2021-02-23
Attending: INTERNAL MEDICINE

## 2021-02-23 ENCOUNTER — HOSPITAL ENCOUNTER (OUTPATIENT)
Dept: CARDIOLOGY | Facility: HOSPITAL | Age: 51
Discharge: HOME | End: 2021-02-23
Attending: INTERNAL MEDICINE

## 2021-02-23 ENCOUNTER — AMBULATORY - HEALTHEAST (OUTPATIENT)
Dept: CARDIOLOGY | Facility: CLINIC | Age: 51
End: 2021-02-23

## 2021-02-23 DIAGNOSIS — Q23.81 BICUSPID AORTIC VALVE: ICD-10-CM

## 2021-02-23 DIAGNOSIS — I25.10 CORONARY ARTERY DISEASE DUE TO LIPID RICH PLAQUE: ICD-10-CM

## 2021-02-23 DIAGNOSIS — I25.83 CORONARY ARTERY DISEASE DUE TO LIPID RICH PLAQUE: ICD-10-CM

## 2021-02-23 LAB
AORTIC ROOT: 3.7 CM
AORTIC VALVE MEAN VELOCITY: 136 CM/S
AR DECEL SLOPE: 2410 MM/S2
AR PEAK VELOCITY: 423 CM/S
ASCENDING AORTA: 3.6 CM
AV DIMENSIONLESS INDEX VTI: 0.6
AV MEAN GRADIENT: 8 MMHG
AV PEAK GRADIENT: 14.4 MMHG
AV REGURGITANT PEAK GRADIENT: 71.6 MMHG
AV REGURGITATION PRESSURE HALF TIME: 541 MS
AV VALVE AREA: 1.9 CM2
AV VELOCITY RATIO: 0.6
BSA FOR ECHO PROCEDURE: 2.35 M2
CV BLOOD PRESSURE: ABNORMAL MMHG
CV ECHO HEIGHT: 70 IN
CV ECHO WEIGHT: 248 LBS
CV STRESS CURRENT BP HE: NORMAL
CV STRESS CURRENT HR HE: 105
CV STRESS CURRENT HR HE: 107
CV STRESS CURRENT HR HE: 107
CV STRESS CURRENT HR HE: 110
CV STRESS CURRENT HR HE: 111
CV STRESS CURRENT HR HE: 119
CV STRESS CURRENT HR HE: 119
CV STRESS CURRENT HR HE: 123
CV STRESS CURRENT HR HE: 126
CV STRESS CURRENT HR HE: 126
CV STRESS CURRENT HR HE: 128
CV STRESS CURRENT HR HE: 130
CV STRESS CURRENT HR HE: 135
CV STRESS CURRENT HR HE: 136
CV STRESS CURRENT HR HE: 140
CV STRESS CURRENT HR HE: 142
CV STRESS CURRENT HR HE: 150
CV STRESS CURRENT HR HE: 150
CV STRESS CURRENT HR HE: 68
CV STRESS CURRENT HR HE: 74
CV STRESS CURRENT HR HE: 80
CV STRESS CURRENT HR HE: 87
CV STRESS CURRENT HR HE: 87
CV STRESS CURRENT HR HE: 88
CV STRESS CURRENT HR HE: 89
CV STRESS CURRENT HR HE: 89
CV STRESS CURRENT HR HE: 90
CV STRESS CURRENT HR HE: 91
CV STRESS CURRENT HR HE: 98
CV STRESS CURRENT HR HE: 99
CV STRESS DEVIATION TIME HE: NORMAL
CV STRESS ECHO PERCENT HR HE: NORMAL
CV STRESS EXERCISE STAGE HE: NORMAL
CV STRESS EXERCISE STAGE REACHED HE: NORMAL
CV STRESS FINAL RESTING BP HE: NORMAL
CV STRESS FINAL RESTING HR HE: 87
CV STRESS MAX HR HE: 151
CV STRESS MAX TREADMILL GRADE HE: 16
CV STRESS MAX TREADMILL SPEED HE: 4.2
CV STRESS PEAK DIA BP HE: NORMAL
CV STRESS PEAK SYS BP HE: NORMAL
CV STRESS PHASE HE: NORMAL
CV STRESS PROTOCOL HE: NORMAL
CV STRESS RESTING PT POSITION HE: NORMAL
CV STRESS RESTING PT POSITION HE: NORMAL
CV STRESS ST DEVIATION AMOUNT HE: NORMAL
CV STRESS ST DEVIATION ELEVATION HE: NORMAL
CV STRESS ST EVELATION AMOUNT HE: NORMAL
CV STRESS TEST TYPE HE: NORMAL
CV STRESS TOTAL STAGE TIME MIN 1 HE: NORMAL
DOP CALC AO PEAK VEL: 190 CM/S
DOP CALC AO VTI: 41.4 CM
DOP CALC LVOT AREA: 3.46 CM2
DOP CALC LVOT DIAMETER: 2.1 CM
DOP CALC LVOT PEAK VEL: 113 CM/S
DOP CALC LVOT STROKE VOLUME: 80 CM3
DOP CALCLVOT PEAK VEL VTI: 23.1 CM
EJECTION FRACTION: 59 % (ref 55–75)
FRACTIONAL SHORTENING: 34.3 % (ref 28–44)
INTERVENTRICULAR SEPTUM IN END DIASTOLE: 1.32 CM (ref 0.6–1)
IVS/PW RATIO: 1.1
LA AREA 1: 20.6 CM2
LA AREA 2: 26.3 CM2
LEFT ATRIUM LENGTH: 5.7 CM
LEFT ATRIUM SIZE: 3.2 CM
LEFT ATRIUM TO AORTIC ROOT RATIO: 0.86 NO UNITS
LEFT ATRIUM VOLUME INDEX: 34.4 ML/M2
LEFT ATRIUM VOLUME: 80.8 ML
LEFT VENTRICLE DIASTOLIC VOLUME INDEX: 45.1 CM3/M2 (ref 34–74)
LEFT VENTRICLE DIASTOLIC VOLUME: 106 CM3 (ref 62–150)
LEFT VENTRICLE MASS INDEX: 60.4 G/M2
LEFT VENTRICLE SYSTOLIC VOLUME INDEX: 18.4 CM3/M2 (ref 11–31)
LEFT VENTRICLE SYSTOLIC VOLUME: 43.3 CM3 (ref 21–61)
LEFT VENTRICULAR INTERNAL DIMENSION IN DIASTOLE: 3.47 CM (ref 4.2–5.8)
LEFT VENTRICULAR INTERNAL DIMENSION IN SYSTOLE: 2.28 CM (ref 2.5–4)
LEFT VENTRICULAR MASS: 142 G
LEFT VENTRICULAR OUTFLOW TRACT MEAN GRADIENT: 3 MMHG
LEFT VENTRICULAR OUTFLOW TRACT MEAN VELOCITY: 75.9 CM/S
LEFT VENTRICULAR OUTFLOW TRACT PEAK GRADIENT: 5 MMHG
LEFT VENTRICULAR POSTERIOR WALL IN END DIASTOLE: 1.17 CM (ref 0.6–1)
LV STROKE VOLUME INDEX: 34 ML/M2
MITRAL VALVE DECELERATION SLOPE: 3240 MM/S2
MITRAL VALVE E/A RATIO: 0.8
MITRAL VALVE PRESSURE HALF-TIME: 68 MS
MV AVERAGE E/E' RATIO: 9 CM/S
MV DECELERATION TIME: 232 MS
MV E'TISSUE VEL-LAT: 10.3 CM/S
MV E'TISSUE VEL-MED: 6.48 CM/S
MV LATERAL E/E' RATIO: 7.3
MV MEDIAL E/E' RATIO: 11.6
MV PEAK A VELOCITY: 90.7 CM/S
MV PEAK E VELOCITY: 75.2 CM/S
MV VALVE AREA PRESSURE 1/2 METHOD: 3.2 CM2
NUC REST DIASTOLIC VOLUME INDEX: 3968 LBS
NUC REST SYSTOLIC VOLUME INDEX: 70 IN
NUC STRESS EJECTION FRACTION: 66 %
RATE PRESSURE PRODUCT: NORMAL
STRESS ECHO BASELINE DIASTOLIC HE: 85
STRESS ECHO BASELINE HR: 68
STRESS ECHO BASELINE SYSTOLIC BP: 133
STRESS ECHO CALCULATED PERCENT HR: 89 %
STRESS ECHO LAST STRESS DIASTOLIC BP: 84
STRESS ECHO LAST STRESS HR: 126
STRESS ECHO LAST STRESS SYSTOLIC BP: 178
STRESS ECHO POST ESTIMATED WORKLOAD: 11.7
STRESS ECHO POST EXERCISE DUR MIN: 10
STRESS ECHO POST EXERCISE DUR SEC: 1
STRESS ECHO TARGET HR: 169
TRICUSPID VALVE ANULAR PLANE SYSTOLIC EXCURSION: 3 CM

## 2021-02-23 ASSESSMENT — MIFFLIN-ST. JEOR: SCORE: 1986.17

## 2021-02-25 ENCOUNTER — AMBULATORY - HEALTHEAST (OUTPATIENT)
Dept: CARDIOLOGY | Facility: CLINIC | Age: 51
End: 2021-02-25

## 2021-02-25 DIAGNOSIS — Z01.812 PRE-PROCEDURE LAB EXAM: ICD-10-CM

## 2021-02-26 ENCOUNTER — COMMUNICATION - HEALTHEAST (OUTPATIENT)
Dept: CARDIOLOGY | Facility: CLINIC | Age: 51
End: 2021-02-26

## 2021-02-26 ENCOUNTER — SURGERY - HEALTHEAST (OUTPATIENT)
Dept: CARDIOLOGY | Facility: CLINIC | Age: 51
End: 2021-02-26

## 2021-02-26 ENCOUNTER — RECORDS - HEALTHEAST (OUTPATIENT)
Dept: LAB | Facility: CLINIC | Age: 51
End: 2021-02-26

## 2021-02-26 DIAGNOSIS — R94.39 ABNORMAL CARDIOVASCULAR STRESS TEST: ICD-10-CM

## 2021-02-26 LAB
ANION GAP SERPL CALCULATED.3IONS-SCNC: 9 MMOL/L (ref 5–18)
BUN SERPL-MCNC: 23 MG/DL (ref 8–22)
CALCIUM SERPL-MCNC: 9.6 MG/DL (ref 8.5–10.5)
CHLORIDE BLD-SCNC: 104 MMOL/L (ref 98–107)
CO2 SERPL-SCNC: 28 MMOL/L (ref 22–31)
CREAT SERPL-MCNC: 1.15 MG/DL (ref 0.7–1.3)
GFR SERPL CREATININE-BSD FRML MDRD: >60 ML/MIN/1.73M2
GLUCOSE BLD-MCNC: 103 MG/DL (ref 70–125)
POTASSIUM BLD-SCNC: 5 MMOL/L (ref 3.5–5)
SODIUM SERPL-SCNC: 141 MMOL/L (ref 136–145)

## 2021-02-27 LAB
SARS-COV-2 PCR COMMENT: NORMAL
SARS-COV-2 RNA SPEC QL NAA+PROBE: NEGATIVE
SARS-COV-2 VIRUS SPECIMEN SOURCE: NORMAL

## 2021-03-01 ENCOUNTER — AMBULATORY - HEALTHEAST (OUTPATIENT)
Dept: CARDIOLOGY | Facility: CLINIC | Age: 51
End: 2021-03-01

## 2021-03-02 ENCOUNTER — SURGERY - HEALTHEAST (OUTPATIENT)
Dept: CARDIOLOGY | Facility: HOSPITAL | Age: 51
End: 2021-03-02

## 2021-03-02 ASSESSMENT — MIFFLIN-ST. JEOR: SCORE: 1998.87

## 2021-03-03 ENCOUNTER — COMMUNICATION - HEALTHEAST (OUTPATIENT)
Dept: SCHEDULING | Facility: CLINIC | Age: 51
End: 2021-03-03

## 2021-03-03 ASSESSMENT — MIFFLIN-ST. JEOR: SCORE: 1990.71

## 2021-03-08 ENCOUNTER — AMBULATORY - HEALTHEAST (OUTPATIENT)
Dept: CARDIAC REHAB | Facility: HOSPITAL | Age: 51
End: 2021-03-08

## 2021-03-11 ENCOUNTER — RECORDS - HEALTHEAST (OUTPATIENT)
Dept: ADMINISTRATIVE | Facility: OTHER | Age: 51
End: 2021-03-11

## 2021-03-12 ENCOUNTER — COMMUNICATION - HEALTHEAST (OUTPATIENT)
Dept: CARDIOLOGY | Facility: CLINIC | Age: 51
End: 2021-03-12

## 2021-03-12 ENCOUNTER — RECORDS - HEALTHEAST (OUTPATIENT)
Dept: ADMINISTRATIVE | Facility: OTHER | Age: 51
End: 2021-03-12

## 2021-03-12 DIAGNOSIS — I25.83 CORONARY ARTERY DISEASE DUE TO LIPID RICH PLAQUE: ICD-10-CM

## 2021-03-12 DIAGNOSIS — I25.10 CORONARY ARTERY DISEASE DUE TO LIPID RICH PLAQUE: ICD-10-CM

## 2021-03-12 RX ORDER — EZETIMIBE 10 MG/1
10 TABLET ORAL DAILY
Qty: 90 TABLET | Refills: 3 | Status: SHIPPED | OUTPATIENT
Start: 2021-03-12 | End: 2022-03-07

## 2021-03-15 ENCOUNTER — OFFICE VISIT - HEALTHEAST (OUTPATIENT)
Dept: CARDIOLOGY | Facility: CLINIC | Age: 51
End: 2021-03-15

## 2021-03-15 DIAGNOSIS — I10 ESSENTIAL HYPERTENSION: ICD-10-CM

## 2021-03-15 DIAGNOSIS — I25.84 CORONARY ARTERY DISEASE DUE TO CALCIFIED CORONARY LESION: ICD-10-CM

## 2021-03-15 DIAGNOSIS — I25.10 CORONARY ARTERY DISEASE DUE TO CALCIFIED CORONARY LESION: ICD-10-CM

## 2021-03-15 RX ORDER — ATORVASTATIN CALCIUM 80 MG/1
80 TABLET, FILM COATED ORAL AT BEDTIME
Qty: 90 TABLET | Refills: 3 | Status: ON HOLD | OUTPATIENT
Start: 2021-03-15 | End: 2022-10-17

## 2021-03-15 ASSESSMENT — MIFFLIN-ST. JEOR: SCORE: 1968.03

## 2021-03-18 ENCOUNTER — RECORDS - HEALTHEAST (OUTPATIENT)
Dept: ADMINISTRATIVE | Facility: OTHER | Age: 51
End: 2021-03-18

## 2021-03-19 ENCOUNTER — RECORDS - HEALTHEAST (OUTPATIENT)
Dept: ADMINISTRATIVE | Facility: OTHER | Age: 51
End: 2021-03-19

## 2021-03-24 ENCOUNTER — COMMUNICATION - HEALTHEAST (OUTPATIENT)
Dept: LAB | Facility: CLINIC | Age: 51
End: 2021-03-24

## 2021-03-25 ENCOUNTER — RECORDS - HEALTHEAST (OUTPATIENT)
Dept: ADMINISTRATIVE | Facility: OTHER | Age: 51
End: 2021-03-25

## 2021-03-26 ENCOUNTER — COMMUNICATION - HEALTHEAST (OUTPATIENT)
Dept: CARDIOLOGY | Facility: CLINIC | Age: 51
End: 2021-03-26

## 2021-03-26 ENCOUNTER — RECORDS - HEALTHEAST (OUTPATIENT)
Dept: ADMINISTRATIVE | Facility: OTHER | Age: 51
End: 2021-03-26

## 2021-03-29 ENCOUNTER — OFFICE VISIT - HEALTHEAST (OUTPATIENT)
Dept: CARDIOLOGY | Facility: CLINIC | Age: 51
End: 2021-03-29

## 2021-03-29 ENCOUNTER — COMMUNICATION - HEALTHEAST (OUTPATIENT)
Dept: CARDIOLOGY | Facility: CLINIC | Age: 51
End: 2021-03-29

## 2021-03-29 DIAGNOSIS — Q23.81 BICUSPID AORTIC VALVE: ICD-10-CM

## 2021-03-29 DIAGNOSIS — I25.84 CORONARY ARTERY DISEASE DUE TO CALCIFIED CORONARY LESION: ICD-10-CM

## 2021-03-29 DIAGNOSIS — I10 ESSENTIAL HYPERTENSION: ICD-10-CM

## 2021-03-29 DIAGNOSIS — E66.9 OBESITY (BMI 35.0-39.9 WITHOUT COMORBIDITY): ICD-10-CM

## 2021-03-29 DIAGNOSIS — I25.10 CORONARY ARTERY DISEASE DUE TO CALCIFIED CORONARY LESION: ICD-10-CM

## 2021-03-29 DIAGNOSIS — E78.2 MIXED HYPERLIPIDEMIA: ICD-10-CM

## 2021-03-29 DIAGNOSIS — G47.33 OSA (OBSTRUCTIVE SLEEP APNEA): ICD-10-CM

## 2021-03-29 LAB
CHOLEST SERPL-MCNC: 99 MG/DL
CREAT SERPL-MCNC: 1.17 MG/DL (ref 0.7–1.3)
FASTING STATUS PATIENT QL REPORTED: YES
GFR SERPL CREATININE-BSD FRML MDRD: >60 ML/MIN/1.73M2
HDLC SERPL-MCNC: 30 MG/DL
LDLC SERPL CALC-MCNC: 52 MG/DL
TRIGL SERPL-MCNC: 85 MG/DL

## 2021-03-29 RX ORDER — TADALAFIL 20 MG/1
20 TABLET ORAL DAILY PRN
Status: SHIPPED | COMMUNITY
Start: 2021-03-24 | End: 2023-03-31

## 2021-03-29 ASSESSMENT — MIFFLIN-ST. JEOR: SCORE: 1936.27

## 2021-04-01 ENCOUNTER — RECORDS - HEALTHEAST (OUTPATIENT)
Dept: ADMINISTRATIVE | Facility: OTHER | Age: 51
End: 2021-04-01

## 2021-04-02 ENCOUNTER — RECORDS - HEALTHEAST (OUTPATIENT)
Dept: ADMINISTRATIVE | Facility: OTHER | Age: 51
End: 2021-04-02

## 2021-04-08 ENCOUNTER — RECORDS - HEALTHEAST (OUTPATIENT)
Dept: ADMINISTRATIVE | Facility: OTHER | Age: 51
End: 2021-04-08

## 2021-04-09 ENCOUNTER — RECORDS - HEALTHEAST (OUTPATIENT)
Dept: ADMINISTRATIVE | Facility: OTHER | Age: 51
End: 2021-04-09

## 2021-04-15 ENCOUNTER — RECORDS - HEALTHEAST (OUTPATIENT)
Dept: ADMINISTRATIVE | Facility: OTHER | Age: 51
End: 2021-04-15

## 2021-04-16 ENCOUNTER — RECORDS - HEALTHEAST (OUTPATIENT)
Dept: ADMINISTRATIVE | Facility: OTHER | Age: 51
End: 2021-04-16

## 2021-04-22 ENCOUNTER — RECORDS - HEALTHEAST (OUTPATIENT)
Dept: ADMINISTRATIVE | Facility: OTHER | Age: 51
End: 2021-04-22

## 2021-04-26 ENCOUNTER — RECORDS - HEALTHEAST (OUTPATIENT)
Dept: ADMINISTRATIVE | Facility: OTHER | Age: 51
End: 2021-04-26

## 2021-04-29 ENCOUNTER — RECORDS - HEALTHEAST (OUTPATIENT)
Dept: ADMINISTRATIVE | Facility: OTHER | Age: 51
End: 2021-04-29

## 2021-05-10 ENCOUNTER — COMMUNICATION - HEALTHEAST (OUTPATIENT)
Dept: CARDIOLOGY | Facility: CLINIC | Age: 51
End: 2021-05-10

## 2021-05-10 DIAGNOSIS — I25.10 CORONARY ARTERY DISEASE DUE TO CALCIFIED CORONARY LESION: ICD-10-CM

## 2021-05-10 DIAGNOSIS — I25.84 CORONARY ARTERY DISEASE DUE TO CALCIFIED CORONARY LESION: ICD-10-CM

## 2021-05-10 DIAGNOSIS — I10 ESSENTIAL HYPERTENSION: ICD-10-CM

## 2021-05-11 RX ORDER — ASPIRIN 81 MG/1
81 TABLET, CHEWABLE ORAL EVERY OTHER DAY
Refills: 0 | Status: ON HOLD | COMMUNITY
Start: 2021-05-11 | End: 2022-10-17

## 2021-05-11 RX ORDER — METOPROLOL SUCCINATE 25 MG/1
12.5 TABLET, EXTENDED RELEASE ORAL DAILY
Qty: 90 TABLET | Refills: 3 | Status: ON HOLD
Start: 2021-05-11 | End: 2022-10-17

## 2021-05-28 ENCOUNTER — RECORDS - HEALTHEAST (OUTPATIENT)
Dept: ADMINISTRATIVE | Facility: CLINIC | Age: 51
End: 2021-05-28

## 2021-05-30 ENCOUNTER — RECORDS - HEALTHEAST (OUTPATIENT)
Dept: ADMINISTRATIVE | Facility: CLINIC | Age: 51
End: 2021-05-30

## 2021-05-30 NOTE — PATIENT INSTRUCTIONS - HE
Mr Taurus Cotto,  I enjoyed visiting with you again today.  I am glad to hear you are doing well.  Per our conversation cut the METOPROLOL in 1/2 and then after a week start 1/2 of the new medication, LISINOPRIL. If no significant side effects after a week stop the METOPROLOL and then continue new one and call me at [phone] with some blood pressures and if side effects and remind to send to mail order.  I will plan on seeing you 3-4 months.  Masoud Albarran

## 2021-05-30 NOTE — PROGRESS NOTES
[facility] Follow-up Note    Assessment & Plan        1. Coronary artery disease due to lipid rich plaque -angiography July 31, 2017 showed normal left main, proximal to mid left anterior descending 40% lesion, normal circumflex, and right coronary artery with distal 95% lesion following a proximal 50-60% lesion which received a drug-coated stent.  Plan is for aspirin indefinitely and now he is off Plavix.  He had an atypical type chest discomfort and was hospitalized with a stress echo showing vague anterior/anterior lateral hypokinesis and a small distribution but was felt to be low risk and he was discharged.  Nuclear exercise stress test off metoprolol in October 2017 showed no ischemia or scar.  It may be small vessel disease and he is on Cialis and cannot use nitrates.  Given this he is using Ranexa which was increased to 1000 mg twice a day and he has had no recurrence of chest discomfort.  If he does I will then most likely pursue repeat angiography.    2. Obesity (BMI 35.0-39.9 without comorbidity) -he has lost weight and is doing well with this.   3. Bicuspid aortic valve -based on echo from February of this year dimensionless index 0.5 with a valve area 1.4 cm , mean gradient of 9 mmHg, peak gradient of 60 mmHg and recheck echo in about a year and a half.   4. Primary hypertension -under good control but given fatigue and bradycardia will taper off his metoprolol to 12-1/2 in the evening and then add on lisinopril 10 mg a day and and discontinue metoprolol.  He will call in blood pressures and let us know if he has side effects and if needed will then give him a 90-day supply to mail order.   5. Mixed hyperlipidemia -LDL excellent at 73 from April of this year.     Plan  1.  Taper off Toprol from 25-12-1/2 and then add on lisinopril 10 mg and discontinue Toprol.  Uptitrate lisinopril as needed provided no side effects and get him a 90-day supply to mail order.  2.  Contact me if he has  "any recurrent chest discomfort at which point time we will pursue angiography.  3.  Follow-up with me in about 3 to 4 months given all these issues.    Subjective  CC: 48-year-old white gentleman being seen post hospital discharge follow-up today.  Since of seen him he was hospitalized due to chest discomfort and MI ruled out and underwent stress echo.  This suggested anterior and anterolateral hypokinesis and a small distribution but he was discharged.  His Ranexa was increased to 1000 mg twice a day.  He denies any significant chest discomfort, palpitations, shortness of breath, PND, orthopnea or peripheral edema and he is quite physically active working out at the gym.    Medications  Current Outpatient Medications   Medication Sig     aspirin 81 mg chewable tablet Chew 1 tablet (81 mg total) daily.     atorvastatin (LIPITOR) 80 MG tablet Take 1 tablet (80 mg total) by mouth at bedtime.     buPROPion (WELLBUTRIN SR) 100 MG 12 hr tablet Take 100 mg by mouth 2 (two) times a day.            metoprolol succinate (TOPROL-XL) 25 MG Take 1 tablet (25 mg total) by mouth at bedtime.     omeprazole (PRILOSEC) 20 MG capsule Take 1 capsule (20 mg total) by mouth at bedtime.     ranolazine (RANEXA) 1,000 mg SR tablet Take 1 tablet (1,000 mg total) by mouth 2 (two) times a day.     tadalafil (CIALIS ORAL) Take 6 mg by mouth as needed.     lisinopril (PRINIVIL,ZESTRIL) 20 MG tablet Take 1 tablet (20 mg total) by mouth daily.       Objective  BP 98/70 (Patient Site: Left Arm, Patient Position: Sitting, Cuff Size: Adult Large)   Pulse 64   Resp 16   Ht 5' 10\" (1.778 m)   Wt (!) 235 lb (106.6 kg)   BMI 33.72 kg/m      General Appearance:    Alert, cooperative, no distress, appears stated age   Head:    Normocephalic, without obvious abnormality, atraumatic   Throat:   Lips, mucosa, and tongue normal; teeth and gums normal   Neck:   Supple, symmetrical, trachea midline, no adenopathy;        thyroid:  No " enlargement/tenderness/nodules; no carotid    bruit or JVD   Back:     Symmetric, no curvature, ROM normal, no CVA tenderness   Lungs:     Clear to auscultation bilaterally, respirations unlabored   Chest wall:    No tenderness or deformity   Heart:    Regular rate and rhythm, S1 and S2 normal, no murmur, rub   or gallop   Abdomen:     Soft, non-tender, bowel sounds active all four quadrants,     no masses, no organomegaly   Extremities:   Normal, atraumatic, no cyanosis or edema   Pulses:   2+ and symmetric all extremities   Skin:   Skin color, texture, turgor normal, no rashes or lesions     Results    Lab Results personally reviewed   Lab Results   Component Value Date    CHOL 129 04/05/2018    CHOL 112 09/07/2017     Lab Results   Component Value Date    HDL 39 (L) 04/05/2018    HDL 31 (L) 09/07/2017     Lab Results   Component Value Date    LDLCALC 73 04/05/2018    LDLCALC 63 09/07/2017     Lab Results   Component Value Date    TRIG 83 04/05/2018    TRIG 88 09/07/2017     Lab Results   Component Value Date    WBC 7.2 06/16/2019    HGB 13.9 (L) 06/16/2019    HCT 42.4 06/16/2019     06/16/2019     Lab Results   Component Value Date    CREATININE 1.12 06/16/2019    BUN 19 06/16/2019     06/16/2019    K 4.1 06/16/2019    CO2 25 06/16/2019     Review of Systems:   General: WNL  Eyes: WNL  Ears/Nose/Throat: WNL  Lungs: WNL  Heart: Chest Pain, Shortness of Breath with activity  Stomach: WNL  Bladder: WNL  Muscle/Joints: WNL  Skin: WNL  Nervous System: Dizziness  Mental Health: WNL     Blood: WNL

## 2021-05-31 VITALS — WEIGHT: 232 LBS | BODY MASS INDEX: 33.29 KG/M2

## 2021-05-31 VITALS — WEIGHT: 235 LBS | HEIGHT: 70 IN | BODY MASS INDEX: 33.64 KG/M2

## 2021-05-31 VITALS — WEIGHT: 239 LBS | BODY MASS INDEX: 34.29 KG/M2

## 2021-05-31 VITALS — WEIGHT: 236 LBS | BODY MASS INDEX: 33.86 KG/M2

## 2021-05-31 VITALS — HEIGHT: 70 IN | WEIGHT: 232 LBS | BODY MASS INDEX: 33.21 KG/M2

## 2021-05-31 VITALS — BODY MASS INDEX: 33.72 KG/M2 | WEIGHT: 235 LBS

## 2021-05-31 VITALS — BODY MASS INDEX: 33.15 KG/M2 | WEIGHT: 231 LBS

## 2021-05-31 VITALS — HEIGHT: 70 IN | WEIGHT: 241.1 LBS | BODY MASS INDEX: 34.52 KG/M2

## 2021-05-31 VITALS — BODY MASS INDEX: 33.58 KG/M2 | WEIGHT: 234 LBS

## 2021-05-31 VITALS — WEIGHT: 237 LBS | BODY MASS INDEX: 34.01 KG/M2

## 2021-05-31 VITALS — BODY MASS INDEX: 33.43 KG/M2 | WEIGHT: 233 LBS

## 2021-05-31 VITALS — WEIGHT: 242 LBS | BODY MASS INDEX: 34.65 KG/M2 | HEIGHT: 70 IN

## 2021-05-31 VITALS — WEIGHT: 233 LBS | BODY MASS INDEX: 33.43 KG/M2

## 2021-05-31 VITALS — BODY MASS INDEX: 33.86 KG/M2 | WEIGHT: 236 LBS

## 2021-05-31 VITALS — BODY MASS INDEX: 34.01 KG/M2 | WEIGHT: 237 LBS

## 2021-05-31 VITALS — HEIGHT: 70 IN | WEIGHT: 238 LBS | BODY MASS INDEX: 34.07 KG/M2

## 2021-05-31 VITALS — WEIGHT: 231 LBS | BODY MASS INDEX: 33.15 KG/M2

## 2021-05-31 NOTE — TELEPHONE ENCOUNTER
----- Message from Naye Negron sent at 8/8/2019  9:14 AM CDT -----  Contact: Pt  General phone call:    Caller: Pt  Primary cardiologist: Deion  Detailed reason for call: LBF asked pt to try new med (Lisinopril) and report back w/ bp readings- pt is out of med   New or active symptoms?   Best phone number: [phone]  Best time to contact:   Ok to leave a detailed message?   Device? No    Additional Info:

## 2021-05-31 NOTE — TELEPHONE ENCOUNTER
-- Message -----  From: Bhumika Albarran MD  Sent: 8/12/2019   5:17 PM  To: Michelle Owusu RN    If his fatigue and ligth headedness was no better while off metoprolol would stay on it.  LF

## 2021-05-31 NOTE — TELEPHONE ENCOUNTER
Received a voicemail re: restarting metoprolol xl 25 mg. Pt stopped lisinopril and restarted metoprolol succinate 25 mg- see documentation 8/9/19. Medication list updated. Left  for patient to call back scheduling to arrange for follow up with [facility] in October. -[facility] [place]

## 2021-05-31 NOTE — PROGRESS NOTES
On call contact  Patient called about HR remainin high after exercise;   BP about 100/ and HR seems regular and about 100  No chest pain; no big distress  Recently tapered off  metoprolol and switched to lisinopril for HTN  Does have CAD with prior PCI/stent  No apparent bleeding/blood loss   Plan  Advised him to take metoprolol 25 mg now  If distress then should go to ER

## 2021-05-31 NOTE — TELEPHONE ENCOUNTER
OV notes from 7/18/19  Per our conversation cut the METOPROLOL in 1/2 and then after a week start 1/2 of the new medication, LISINOPRIL. If no significant side effects after a week stop the METOPROLOL and then continue new one and call me at [phone] with some blood pressures and if side effects and remind to send to mail order.  I will plan on seeing you 3-4 months.  Masoud Albarran      Returned call to discuss new medication. Pt states he starting having a dry cough that is especially worse at night since starting the lisinopril. He shares the following BP recordings -   7/30 119/71 (1/2 metoprolol, 1/2 lisinopril)  8/1 125/70  8/2 114/67  8/6 122/74 (started 20 mg lisinopril, discontinued metoprolol)  8/7 135, 83, /59  8/8 124/79     Dr. Albarran, pt called to share BP recordings above since starting Lisinopril and weaning off metoprolol. He does complain of a dry cough that started after taking lisinopril. He states this dry cough is worse at night. Any new recommendations?

## 2021-05-31 NOTE — TELEPHONE ENCOUNTER
From: Bhumika Albarran MD  Sent: 8/8/2019  12:51 PM  To: Michelle Owusu, RN  Please inform him the dry cough is usually time-limited.  If he is willing to put up with it for the short-term I would suggest discontinuing the metoprolol altogether.  The tapering of the metoprolol was to give him more energy from his fatigue, if he feels like the energy is no better, and would rather not have a dry cough then we can go back to full dose metoprolol.  His decision, I would suggest however discontinuing metoprolol together, moderate and dry cough for another week to 2 weeks, if persistent then would go back to metoprolol.      Called patient to inform him of Dr Albarran's recommendations. Advised him to call back to discuss further and to determine which pharmacy he would like prescription refill sent to     [facility] asking caller to sent prescription to [facility] pharmacy. Pt will continue to monitor dry couch and call back if symptoms persist. 30-day of lisinopril sent to [facility]. Metoprolol discontinued. -kcl

## 2021-05-31 NOTE — TELEPHONE ENCOUNTER
Called patient and LM to call back to discuss response from LBF. -[facility] [place]

## 2021-05-31 NOTE — TELEPHONE ENCOUNTER
Received call on direct line from pt. Pt was having issues with lisinopril which lead him to call the on-call cardiologist over the weekend (see 8/9 Hetal note). Pt states his HR remained high for several hours after exercise. Dr. Fong instructed him to start metoprolol 25 mg in place of lisinopril. Since the switch pt is feeling better and no longer has issues with his HR. Pt calling to update Dr. Albarran. Informed him that Dr. Albarran is out of the office this week and to continue with current medications.       Dr. Albarran, CHRISTIAN when you return, pt spoke with Dr. Fong over the weekend -he called the on-call cardiologist with concerns regarding elevated HR post exercise for several hours (see 8/9 documentation from Hetal). He was instructed to start taking Metoprolol 25 mg. Pt called to update you - he is no longer taking Lisinopril. Pt denies any reoccurring issues with Metoprolol. Any new recommendations for Alec?

## 2021-06-01 VITALS — BODY MASS INDEX: 33.64 KG/M2 | WEIGHT: 235 LBS | HEIGHT: 70 IN

## 2021-06-01 VITALS — HEIGHT: 70 IN | BODY MASS INDEX: 32.64 KG/M2 | WEIGHT: 228 LBS

## 2021-06-02 NOTE — PATIENT INSTRUCTIONS - HE
Mr Taurus Cotto,  I enjoyed visiting with you again today.  I am glad to hear you are doing well.  Per our conversation let us get the echo of the valve and the lipids and liver around March.  I will plan on seeing you 6 months or sooner if needed.  Masoud Albarran

## 2021-06-03 VITALS
RESPIRATION RATE: 16 BRPM | DIASTOLIC BLOOD PRESSURE: 82 MMHG | WEIGHT: 242 LBS | HEART RATE: 68 BPM | BODY MASS INDEX: 34.72 KG/M2 | SYSTOLIC BLOOD PRESSURE: 126 MMHG

## 2021-06-03 VITALS — WEIGHT: 235 LBS | HEIGHT: 70 IN | BODY MASS INDEX: 33.64 KG/M2

## 2021-06-04 VITALS — WEIGHT: 242 LBS | BODY MASS INDEX: 34.65 KG/M2 | HEIGHT: 70 IN

## 2021-06-05 VITALS — HEIGHT: 70 IN | WEIGHT: 248 LBS | BODY MASS INDEX: 35.5 KG/M2

## 2021-06-05 VITALS — WEIGHT: 249 LBS | HEIGHT: 70 IN | BODY MASS INDEX: 35.65 KG/M2

## 2021-06-05 VITALS
BODY MASS INDEX: 35.5 KG/M2 | HEIGHT: 70 IN | OXYGEN SATURATION: 93 % | WEIGHT: 248 LBS | RESPIRATION RATE: 16 BRPM | SYSTOLIC BLOOD PRESSURE: 120 MMHG | HEART RATE: 73 BPM | DIASTOLIC BLOOD PRESSURE: 88 MMHG

## 2021-06-05 VITALS
HEIGHT: 70 IN | WEIGHT: 237 LBS | HEART RATE: 60 BPM | SYSTOLIC BLOOD PRESSURE: 108 MMHG | RESPIRATION RATE: 16 BRPM | BODY MASS INDEX: 33.93 KG/M2 | DIASTOLIC BLOOD PRESSURE: 72 MMHG

## 2021-06-05 VITALS
SYSTOLIC BLOOD PRESSURE: 114 MMHG | BODY MASS INDEX: 34.93 KG/M2 | DIASTOLIC BLOOD PRESSURE: 72 MMHG | HEIGHT: 70 IN | OXYGEN SATURATION: 95 % | RESPIRATION RATE: 16 BRPM | WEIGHT: 244 LBS | HEART RATE: 69 BPM

## 2021-06-12 NOTE — PROGRESS NOTES
ITP ASSESSMENT   Assessment Day: Initial  Session Number: 1  Precautions: Standard  Diagnosis: Stent  Risk Stratification: High  Referring Provider: Dandre Love,*  EXERCISE  Exercise Assessment: Initial     6 Minute Walk Test   Pre   Pre Exercise HR: 60                  Pre Exercise BP: 129/86    Peak  Peak HR: 80                 Peak BP: 141/82  Peak feet: 1525  Peak O2 SAT: 98  Peak RPE: 13  Peak MPH: 2.89    Symptoms:  Peak Symptoms: None    5 mins. Post  5 Min Post HR: 60  5 Min Post BP: 133/81                         Exercise Plan  Goals Next 30 days  ADL'S: Mow yard without fatigue  Leisure: Walk 1-2 x day 15-20'  Go [employer]  Work: Tolerate FT work    Education Goals: Patient can state cardiac s/s and appropriate emergency response.;Has system for taking medication.;Medication review  Education Goals Met: Has system for taking medication.    Exercise Prescription  Exercise Mode: Treadmill;Bike;Nustep;Arm Erg.  Frequency: 2 x week  Duration: 40'  Intensity / THR: 20-30 beats above resting heart rate  RPE 11-14  Progression / Met level: 3.1-3.5  Resistive Training?: No (Will complete in the future)    Current Exercise (mins/week): 5    Interventions  Home Exercise:  Mode: Walk  Frequency: 1-2 x daily  Duration: 15-20'    Education Material : Educational videos;Provide written material;Offer educational classes;Individual education and counseling    Education Completed  Exercise Education Completed: Signs and Symptoms;RPE;Emergency Plan;Home Exercise;Warm up/cool down;FITT Principles;BP/HR Reponse to exercise;Benefits of Exercise;End point of exercise            Exercise Follow-up/Discharge  Follow up/Discharge: ENcouraged walking 1-2 x day 15-20' NUTRITION  Nutrition Assessment: Initial    Nutrition Risk Factors:  Nutrition Risk Factors: Dyslipidemia;Overweight  Cholesterol: 238  LDL: 162  HDL: 30  Triglycerides: 231    Nutrition Plan  Interventions  Nutrition Interventions: Diet consult;Diet  "class;Therapist/Patient discussion;Educational videos;Provide with written material    Education Completed  Nutrition Education Completed: Risk factor overview    Goals  Nutrition Goals (Next 30 days): Review Dietitian schedule;Provide Rate your Plate Survey    Goals Met  Nutrition Goals Met: Provided Rate your Plate Survey;Reviewed Dietitian schedule    Height, Weight, and  BMI  Weight: 242 lb (109.8 kg)  Height: 5' 10\" (1.778 m)  BMI: 34.72    Nutrition Follow-up  Follow-up/Discharge: Encouraged pt to complete Diet Habit Survey       Other Risk Factors  Other Risk Factor Assessment: Initial    HTN Risk Factor: Hypertension    Pre Exercise BP: 129/86  Post Exercise BP: 133/81    Hypertension Plan  Goals  HTN Goals: Follow low sodium diet;Take medication as prescribed;Exercises regularly    Goals Met  HTN Goals Met: Take medication as prescribed    HTN Interventions  HTN Interventions: Diet consult;Therapist/patient discussion;Provide written material;Offer educational classes;Offer educational videos    HTN Education Completed  HTN Education Completed: Risk factor overview    Tobacco Risk Factor: Tobacco    Initial Use:: Occasional Cigars;   Quit Cigarettes> 10 years ago  Current Use:: None    Tobacco Plan  Tobacco Goals  Tobacco Goals: Patient remain tobacco free    Goals Met  Tobacco Goals Met: Patient remain tobacco free    Tobacco Interventions  Tobacco Interventions: Therapist/patient discussion;Provide written material;Offer educational videos;Offer class on risk factor modification    Tobacco Education Completed  Tobacco Education Completed: Risk factor overview;Health benefits of tobacco cessation    Risk Factor Follow-up   Follow-up/Discharge: Ecouraged pt to remain smoke free;   PSYCHOSOCIAL  Psychosocial Assessment: Initial     [facility] Q of L Summary Score: 28    NAILA-D Score: 23    Psychosocial Risk Factor: Stress    Psychosocial Plan  Interventions  If NAILA-D > 15 send letter to  Dr. Moya " Long  Interventions: Offer educational videos and classes;Provide written material;Individual education and counseling    Education Completed  Education Completed: Relaxation/Coping Techniques    Goals  Goals (Next 30 days): Identified Support system;Oriented to stress management classes;Identify stressors;Practicing stress management skills    Goals Met  Goals Met: Identified Support system;Oriented to stress management classes;Identify stressors    Psychosocial Follow-up  Follow-up/Discharge: Encouraged pt to attend Stress/ Relaxation class and Emotional Aspects class             Signature: _____________________________________________________________    Date: __________________     Time: __________________

## 2021-06-12 NOTE — PROGRESS NOTES
Assessment/Plan:     1.  Coronary artery disease: He was hospitalized July 28 - August 1 with chest pain and shortness of breath.  Coronary angiogram showed 95% stenosis distal RCA, 55% proximal RCA, and 40% proximal to mid LAD.  Distal RCA was treated with one drug-eluting stent. Dual antiplatelet therapy is being used with aspirin indefinitely and clopidogrel for 1 year. We discussed the importance of antiplatelet therapy and talking with his cardiologist prior to stopping these medications for any reason.  Puncture site is healing well.      Risk factor modification and lifestyle management topics were discussed including managing comorbidities, weight loss, heart healthy diet and exercise.  He is participating in cardiac rehab.    2.  Dyslipidemia: Taurus Cotto is on high intensity statin therapy with atorvastatin 80 mg daily.  His LDL is 162.  We discussed a diet low in saturated fat, weight loss, and exercise along with medication for better control of cholesterol.     Alec will follow up with Dr. Albarran on October 2.    Subjective:     Taurus Cotto is seen at [facility] for post coronary intervention follow up.  He was hospitalized July 28 - August 1 with chest pain and shortness of breath.  Coronary angiogram showed 95% stenosis distal RCA, 55% proximal RCA, and 40% proximal to mid LAD.  Distal RCA was treated with one drug-eluting stent. Dual antiplatelet therapy is being used with aspirin indefinitely and clopidogrel for 1 year.  He has a history of bicuspid aortic valve and dyslipidemia.    He has had a few episodes of mild chest pain.  He has had these pains for the past 10 years.  Pain is different than chest pain that brought him to the hospital.  He is no longer taking Prilosec and denies that this is heartburn.  He is participating in cardiac rehab and denies any symptoms.  He denies fatigue, shortness of breath and lower extremity edema.      Review of Systems:   General: WNL  Eyes:  WNL  Ears/Nose/Throat: WNL  Lungs: WNL  Heart: Chest Pain  Stomach: WNL  Bladder: WNL  Muscle/Joints: WNL  Skin: Poor Wound Healing  Nervous System: WNL  Mental Health: Anxiety     Blood: WNL     Patient Active Problem List   Diagnosis     Chest pain     Sinus bradycardia     Obesity (BMI 35.0-39.9 without comorbidity)     Bicuspid aortic valve     Coronary artery disease     Dyslipidemia       Past Medical History:   Diagnosis Date     Cardiac murmur      Hyperlipidemia        Past Surgical History:   Procedure Laterality Date     CARDIAC CATHETERIZATION  07/31/2017    AUREA to distal RCA     CARDIAC CATHETERIZATION N/A 7/31/2017    Procedure: Coronary Angiogram;  Surgeon: Dandre Love MD;  Location: [facility] Cath Lab;  Service:      CARPAL TUNNEL RELEASE Right      FRACTURE SURGERY       HERNIA REPAIR       VT CATH PLMT L HRT & ARTS W/NJX & ANGIO IMG S&I Left 7/31/2017    Procedure: Left Heart Catheterization Without Left Ventriculogram;  Surgeon: Dandre Love MD;  Location: [facility] Cath Lab;  Service: Cardiology       No family history on file.    Social History     Social History     Marital status:      Spouse name: N/A     Number of children: N/A     Years of education: N/A     Occupational History     Not on file.     Social History Main Topics     Smoking status: Former Smoker     Types: Cigarettes, Cigars     Smokeless tobacco: Never Used      Comment: still occasionally smokes cigars     Alcohol use No     Drug use: No     Sexual activity: Not on file     Other Topics Concern     Not on file     Social History Narrative       Current Outpatient Prescriptions   Medication Sig Dispense Refill     aspirin 81 mg chewable tablet Chew 1 tablet (81 mg total) daily.  0     atorvastatin (LIPITOR) 80 MG tablet Take 1 tablet (80 mg total) by mouth at bedtime. 30 tablet 0     buPROPion (WELLBUTRIN SR) 100 MG 12 hr tablet Take 100 mg by mouth 2 (two) times a day.       clopidogrel  (PLAVIX) 75 mg tablet Take 1 tablet (75 mg total) by mouth daily. 30 tablet 0     gabapentin (NEURONTIN) 100 MG capsule Take 100 mg by mouth at bedtime.       metoprolol succinate (TOPROL-XL) 25 MG Take 1 tablet (25 mg total) by mouth at bedtime. 30 tablet 0     tadalafil (CIALIS) 20 MG tablet Take 20 mg by mouth daily as needed for erectile dysfunction.       No current facility-administered medications for this visit.        No Known Allergies    Objective:     Vitals:    08/18/17 0750   BP: 102/70   Pulse: 60   Resp: 16     Body mass index is 34.15 kg/(m^2).      General Appearance:   Alert, cooperative and in no acute distress.   HEENT:  No scleral icterus; the mucous membranes were pink and moist.   Chest: The spine was straight. The chest was symmetric.   Lungs:   Respirations unlabored; the lungs are clear to auscultation.   Cardiovascular:   Regular rhythm. S1 and S2 without murmur, clicks or rubs. Carotid pulses are intact and symmetrical.  No carotid bruits noted.   Abdomen:  Soft, nontender, nondistended, bowel sounds present   Extremities: No cyanosis, clubbing, or edema.   Skin: No xanthelasma.   Neurologic: Mood and affect are appropriate.   Puncture site: Left radial site is soft with no bruising, small scab.  Radial pulses and Pedal pulses intact and symmetrical.  CMS intact.         Lab Review   Lab Results   Component Value Date    CREATININE 1.21 08/01/2017    BUN 18 08/01/2017     08/01/2017    K 4.5 08/01/2017     08/01/2017    CO2 25 08/01/2017     Creatinine (mg/dL)   Date Value   08/01/2017 1.21   07/29/2017 1.04   05/08/2017 1.05   08/30/2016 1.13       Cardiographics  Echocardiogram 7/29/17:    1.Left ventricle ejection fraction is normal. The calculated left ventricular ejection fraction is 58%.    2.TAPSE is normal, which is consistent with normal right ventricular systolic function.    3.Aortic Valve: The valve is bicuspid. The aortic valve is sclerotic without reduced  excursion. No stenosis. Mild regurgitation.    4.No previous study for comparison.            Christen Bullock, [facility]

## 2021-06-12 NOTE — PROGRESS NOTES
Cardiac Rehab  Phase II Assessment    Assessment Date: 8/7/17    Diagnosis: PCI/STENT  Date of Onset: 7/31/17  Procedure: PCI/STENT  Date of Onset: 7/31/17  ICD/Pacemaker: No  Post-op Complications:   ECG History: SB/SR EF%:58%  Past Medical History:   Patient Active Problem List   Diagnosis     Chest pain     SOB (shortness of breath)     Sinus bradycardia     Obesity (BMI 35.0-39.9 without comorbidity)     Bicuspid aortic valve     Coronary artery calcification     Other chest pain     Unstable angina     Past Medical History:   Diagnosis Date     Cardiac murmur      Hyperlipidemia          Physical Assessment  Precautions/ Physical Limitations: Standard  Oxygen: No  O2 Sats: 97-98% Lung Sounds: Clear Edema:   Incisions:   Sleeping Pattern: fair   Appetite: fair   Nutrition Risk Screen: Weight loss    Pain  Location:   Characteristics:  Intensity: (0-10 scale) 0  Current Pain Management: Occasional TYlenol  Intervention:   Response:     Psychosocial/ Emotional Health  1. In the past 12 months, have you been in a relationship where you have been abused physically, emotionally, sexually or financially? No  notified: NA  2. Who do you turn to for emotional support?: Wife and Dad  3. Do you have cultural or spiritual needs? No  4. Have there been any major life changes in the past 12 months? Yes ;  Daughter moving out of [place] for college soon    Referral Information  Primary Physician: Nita Ratliff MD  Cardiologist: Dr. Valero  Surgeon:     Home exercise/Equipment: Nordic Track    Patient's long-term goals:  Resume home walking, Yard work, Fishing without Fatigue    1. Living Accommodations: Home Steps: Yes      Support people at home: Wife and 2 daughters   2. Marital Status:   3. Family is able to assist with cares      Jew/Community involvement: Yes  4. Recreation/Hobbies:  Fishing, Hunting, Walking        See Doc Flowsheet

## 2021-06-12 NOTE — PROGRESS NOTES
ITP ASSESSMENT   Assessment Day: 30 Day  Session Number: 7  Precautions: Standard  Diagnosis: Stent  Risk Stratification: High  Referring Provider: Dr. Love  EXERCISE  Exercise Assessment: Reassessment   Pt currently exercising at 4.3 METS. Will continue to increase as tolerated.                          Exercise Plan  Goals Next 30 days  ADL'S: Mow lawn w/o Fatigue  Leisure: Go hunting, Lose 8lbs (225lbs goals wt)  Work: Tolerate FT work    Education Goals: Patient can state cardiac s/s and appropriate emergency response.;Has system for taking medication.;Medication review  Education Goals Met: Has system for taking medication.                        Goals Met  Initial ADL's goals met: Walked 1-2/day for 15-20 minutes  Intial Work goals met: Tolerated full time work  Initial Progression: Has not mowed lawn, progressed toward other goals    Exercise Prescription  Exercise Mode: Treadmill;Bike;Nustep;Arm Erg.  Frequency: 2x/week  Duration: 40 minutes  Intensity / THR: 20-30 beats above resting heart rate  RPE 11-14  Progression / Met level: 4.5-5  Resistive Training?: Yes    Current Exercise (mins/week): 320    Interventions  Home Exercise:  Mode: Walking  Frequency: 5-7 days/week  Duration: 40-60 minutes    Education Material : Educational videos;Provide written material;Offer educational classes;Individual education and counseling    Education Completed  Exercise Education Completed: Signs and Symptoms;RPE;Emergency Plan;Home Exercise;Warm up/cool down;FITT Principles;BP/HR Reponse to exercise;Benefits of Exercise;End point of exercise;Medication review            Exercise Follow-up/Discharge  Follow up/Discharge: Encouraged to continue home exercise         NUTRITION  Nutrition Assessment: Reassessment    Nutrition Risk Factors:  Nutrition Risk Factors: Dyslipidemia;Overweight  Cholesterol: 238  LDL: 162  HDL: 30  Triglycerides: 231    Nutrition Plan  Interventions  Nutrition Interventions: Diet consult;Diet  "class;Therapist/Patient discussion;Educational videos;Provide with written material    Education Completed  Nutrition Education Completed: Risk factor overview;Low Saturated fat diet;Low sodium diet    Goals  Nutrition Goals (Next 30 days): Patient can identify their risk factors for CAD;Patient will follow a low sodium diet;Patient knows appropriate portion size;Patient will follow a low saturated fat diet    Goals Met  Nutrition Goals Met: Patient can identify their risk factors for CAD;Patient follows a low sodium diet;Patient states following a low saturated fat diet    Height, Weight, and  BMI  Weight: 234 lb (106.1 kg)  Height: 5' 10\" (1.778 m)  BMI: 33.58    Nutrition Follow-up  Follow-up/Discharge: Plans to meet with dietician next week     Other Risk Factors  Other Risk Factor Assessment: Reassessment    HTN Risk Factor: Hypertension    Pre Exercise BP: 124/68  Post Exercise BP: 106/70    Hypertension Plan  Goals  HTN Goals: Follow low sodium diet;Take medication as prescribed;Exercises regularly    Goals Met  HTN Goals Met: Follow low sodium diet;Take medication as prescribed;Exercises regularly    HTN Interventions  HTN Interventions: Diet consult;Therapist/patient discussion;Provide written material;Offer educational classes;Offer educational videos    HTN Education Completed  HTN Education Completed: Risk factor overview;Medication review    Tobacco Risk Factor: Tobacco    Initial Use:: Occasional Cigar  Current Use:: None    Tobacco Plan  Tobacco Goals  Tobacco Goals: Patient remain tobacco free    Goals Met  Tobacco Goals Met: Patient remain tobacco free    Tobacco Interventions  Tobacco Interventions: Therapist/patient discussion;Provide written material;Offer educational videos;Offer class on risk factor modification    Tobacco Education Completed  Tobacco Education Completed: Risk factor overview;Health benefits of tobacco cessation    Risk Factor Follow-up   Follow-up/Discharge: Pt remaining smoke " free       PSYCHOSOCIAL  Psychosocial Assessment: Reassessment     [facility] JAGUAR Q of L Summary Score: 28    NAILA-D Score: 23    Psychosocial Risk Factor: Stress    Psychosocial Plan  Interventions  Interventions: Offer educational videos and classes;Provide written material;Individual education and counseling    Education Completed  Education Completed: Relaxation/Coping Techniques    Goals  Goals (Next 30 days): Identified Support system;Oriented to stress management classes;Identify stressors;Practicing stress management skills    Goals Met  Goals Met: Identified Support system;Oriented to stress management classes;Identify stressors    Psychosocial Follow-up  Follow-up/Discharge: Encouraged education classes, Pt stated managing stress         Patient involved in Goal setting?: Yes    Signature: _____________________________________________________________    Date: __________________    Time: __________________

## 2021-06-13 NOTE — PROGRESS NOTES
ITP ASSESSMENT   Assessment Day: 90 Day  Session Number: 23  Precautions: Standard  Diagnosis: Stent  Risk Stratification: High  Referring Provider: Ivan DOMINGUEZ  Exercise Assessment: Reassessment     Tolerates 40' at 5.5 METs                         Exercise Plan  Goals Next 30 days  ADL'S: Lose 5-10lbs  Leisure: Start home strength training program    Education Goals: All goals in this section met  Education Goals Met: Patient can state cardiac s/s and appropriate emergency response.;Has system for taking medication.;Medication review.                        Goals Met  60 day ADL'S goals met: Pt has raked lawn/fall yard cleanup - goal met  60 day Leisure goals met: Pt has not lost weight - goal in progress  60 Day Progression: Pt is motivated to lose weight, will start home strength program and increase aerobic exercise time    30 day ADL'S goals met: Has mowed lawn but did get tired   30 day Leisure goals met: Has met goal of hunting and has lost 8#    Initial ADL's goals met: Walked 1-2/day for 15-20 minutes  Intial Work goals met: Tolerated full time work  Initial Progression: Has not mowed lawn, progressed toward other goals    Exercise Prescription  Exercise Mode: Treadmill;Bike;Nustep;Arm Erg.;Hallway Walking  Frequency: 2x/week  Duration: 40'  Intensity / THR: 20-30 beats above resting heart rate  RPE 11-14  Progression / Met level: 5.5-6  Resistive Training?: Yes    Current Exercise (mins/week): 380    Interventions  Home Exercise:  Mode: Walk  Frequency: 5 days/week  Duration: 30-60'    Education Material : Educational videos;Provide written material;Individual education and counseling;Offer educational classes    Education Completed  Exercise Education Completed: Cardiac Anatomy;Signs and Symptoms;Medication review;RPE;Emergency Plan;Home Exercise;Warm up/cool down;FITT Principles;BP/HR Reponse to exercise;Benefits of Exercise;End point of exercise            Exercise Follow-up/Discharge  Follow  "up/Discharge: Reviewed exercise program. Pt is planning to start strength training regularly, as well as increasing time of aerobic exercise NUTRITION  Nutrition Assessment: Reassessment    Nutrition Risk Factors:  Nutrition Risk Factors: Dyslipidemia;Overweight  Cholesterol: 238  LDL: 162  HDL: 30  Triglycerides: 231    Nutrition Plan  Interventions  Nutrition Interventions: Diet consult;Diet class;Therapist/Patient discussion;Educational videos;Provide with written material  Rate Your Plate Score: 51    Education Completed  Nutrition Education Completed: Low Saturated fat diet;Risk factor overview;Low sodium diet;Weight management    Goals  Nutrition Goals (Next 30 days): Patient will follow a low sodium diet;Patient will follow a low saturated fat diet;Patient will lose weight    Goals Met  Nutrition Goals Met: Patient can identify their risk factors for CAD;Patient follows a low sodium diet;Patient states following a low saturated fat diet;Completed Nutritional Risk Screen;Provided Rate your Plate Survey;Reviewed Dietitian schedule    Height, Weight, and  BMI  Weight: 235 lb (106.6 kg)  Height: 5' 10\" (1.778 m)  BMI: 33.72    Nutrition Follow-up  Follow-up/Discharge: Reviewed heart healthy diet. Pt is motivated to lose weight       Other Risk Factors  Other Risk Factor Assessment: Reassessment    HTN Risk Factor: Hypertension    Pre Exercise BP: 108/70  Post Exercise BP: 120/68    Hypertension Plan  Goals  HTN Goals: Patient demonstrates understanding of HTN, no goals identified for the next 30 days    Goals Met  HTN Goals Met: Follow low sodium diet;Take medication as prescribed;Exercises regularly    HTN Interventions  HTN Interventions: Diet consult;Therapist/patient discussion;Provide written material;Offer educational videos;Offer educational classes    HTN Education Completed  HTN Education Completed: Low sodium diet;Medication review;Risk factor overview    Tobacco Risk Factor: Tobacco    Initial Use:: " Occasional Cigar  Current Use:: Smoke free    Tobacco Plan  Tobacco Goals  Tobacco Goals: Patient demonstrates understanding of tobacco cessation, no goals identified for the next 30 days    Goals Met  Tobacco Goals Met: Patient remain tobacco free    Tobacco Interventions  Tobacco Interventions: Therapist/patient discussion;Provide written material;Offer educational videos;Offer class on risk factor modification    Tobacco Education Completed  Tobacco Education Completed: Identifies triggers;Health benefits of tobacco cessation;Risk factor overview    Risk Factor Follow-up   Follow-up/Discharge: Reviewed all risk factors and plan for risk mgmt   PSYCHOSOCIAL  Psychosocial Assessment: Reassessment     Psychosocial Risk Factor: Stress    Psychosocial Plan  Interventions  Interventions: Offer educational videos and classes;Provide written material;Individual education and counseling;Offer Spiritual Care consult    Education Completed  Education Completed: Relaxation/Coping Techniques;Effects of stress on body    Goals  Goals (Next 30 days): Practicing stress management skills    Goals Met  Goals Met: Identified Support system;Oriented to stress management classes;Identify stressors    Psychosocial Follow-up  Follow-up/Discharge: Reviewed effects of stress on the heart and importance of stress mgmt           Patient involved in Goal setting?: Yes    Signature: _____________________________________________________________    Date: __________________    Time: __________________

## 2021-06-13 NOTE — PROGRESS NOTES
ITP ASSESSMENT   Assessment Day: 60 Day  Session Number: 15  Precautions: standard  Diagnosis: Stent  Risk Stratification: High  Referring Provider: Dr. Love  EXERCISE  Exercise Assessment: Reassessment     6 Minute Walk Test   Pre   Pre Exercise HR: 60                  Pre Exercise BP: 129/86    Peak  Peak HR: 80                 Peak BP: 141/82  Peak feet: 1525  Peak O2 SAT: 98  Peak RPE: 13  Peak MPH: 2.89    Symptoms:  Peak Symptoms: None    5 mins. Post  5 Min Post HR: 60  5 Min Post BP: 133/81                         Exercise Plan  Goals Next 30 days  ADL'S: [place] do yard cleanup.  Leisure: Lose another  10#      Education Goals: Patient can state cardiac s/s and appropriate emergency response.;Has system for taking medication.;Medication review  Education Goals Met: Has system for taking medication.                        Goals Met  30 day ADL'S goals met: Has mowed lawn but did get tired   30 day Leisure goals met: Has met goal of hunting and has lost 8#  No Data Recorded  No Data Recorded    Initial ADL's goals met: Walked 1-2/day for 15-20 minutes  No Data Recorded  Intial Work goals met: Tolerated full time work  Initial Progression: Has not mowed lawn, progressed toward other goals    Exercise Prescription  Exercise Mode: Treadmill;Bike;Nustep;Arm Erg.  Frequency: 2x week  Duration: 40 minutes  Intensity / THR: 20-30 beats above resting heart rate  RPE 11-14  Progression / Met level: 4.5-6  Resistive Training?: No    Current Exercise (mins/week): 320    Interventions  Home Exercise:  Mode: walk  Frequency: avg 5 days a week  Duration: 40 min.    Education Material : Educational videos;Provide written material;Offer educational classes;Individual education and counseling    Education Completed  Exercise Education Completed: Signs and Symptoms;RPE;Emergency Plan;Home Exercise;Warm up/cool down;FITT Principles;BP/HR Reponse to exercise;Benefits of Exercise;End point of exercise;Medication review        "     Exercise Follow-up/Discharge  Follow up/Discharge: motivated to increase home exercise NUTRITION  Nutrition Assessment: Reassessment    Nutrition Risk Factors:  Nutrition Risk Factors: Dyslipidemia;Overweight    Nutrition Plan  Interventions  Nutrition Interventions: Diet consult;Diet class;Therapist/Patient discussion;Educational videos;Provide with written material  Rate Your Plate Score: 51    Education Completed  Nutrition Education Completed: Low Saturated fat diet;Low sodium diet;Weight management    Goals  Nutrition Goals (Next 30 days): Patient will follow a low sodium diet;Patient will follow a low saturated fat diet;Patient will lose weight    Goals Met  Nutrition Goals Met: Patient can identify their risk factors for CAD;Patient follows a low sodium diet;Patient states following a low saturated fat diet    Height, Weight, and  BMI  Weight: 231 lb (104.8 kg)  Height: 5' 10\" (1.778 m)  BMI: 33.15    Nutrition Follow-up  Follow-up/Discharge: Cont. to be motivated to lose wt       Other Risk Factors  Other Risk Factor Assessment: Reassessment    HTN Risk Factor: Hypertension    Pre Exercise BP: 100/66  Post Exercise BP: 112/72    Hypertension Plan  Goals  HTN Goals: Follow low sodium diet;Take medication as prescribed;Exercises regularly    Goals Met  HTN Goals Met: Follow low sodium diet;Take medication as prescribed;Exercises regularly    HTN Interventions  HTN Interventions: Diet consult;Therapist/patient discussion;Provide written material;Offer educational classes;Offer educational videos    HTN Education Completed  HTN Education Completed: Risk factor overview;Medication review    Tobacco Risk Factor: Tobacco    Initial Use:: Occasional Cigar  Current Use:: 0    Tobacco Plan  Tobacco Goals  Tobacco Goals: Patient remain tobacco free    Goals Met  Tobacco Goals Met: Patient remain tobacco free    Tobacco Interventions  Tobacco Interventions: Therapist/patient discussion;Provide written " material;Offer educational videos;Offer class on risk factor modification    Tobacco Education Completed  Tobacco Education Completed: Risk factor overview;Health benefits of tobacco cessation    Risk Factor Follow-up   Follow-up/Discharge: Pt remaining smoke free   PSYCHOSOCIAL  Psychosocial Assessment: Reassessment       Psychosocial Risk Factor: Stress    Psychosocial Plan  Interventions  Interventions: Offer educational videos and classes;Provide written material;Individual education and counseling    Education Completed  Education Completed: Relaxation/Coping Techniques    Goals  Goals (Next 30 days): Practicing stress management skills    Goals Met  Goals Met: Identified Support system;Oriented to stress management classes;Identify stressors    Psychosocial Follow-up  Follow-up/Discharge: Encouraged education classes, Pt stated managing stress           Patient involved in Goal setting?: Yes    Signature: _____________________________________________________________    Date: __________________    Time: __________________See Doc Flowsheet

## 2021-06-13 NOTE — PROGRESS NOTES
[facility] Follow-up Note    Assessment & Plan        1. Coronary artery disease due to lipid rich plaque -angiography July 31, 2017 showed normal left main, proximal to mid left anterior descending 40% lesion, normal circumflex, and right coronary artery with distal 95% lesion following a proximal 50-60% lesion which received a drug-coated stent.  Plan is for aspirin and Plavix until at least July and then aspirin thereafter.  He is having vague chest discomfort which sounds atypical but given this I will arrange for nuclear exercise stress test off metoprolol.   2. Gastroesophageal reflux disease without esophagitis -he is on Prilosec and have asked him to take Prilosec 12 hours away from his Plavix.   3. Dyslipidemia -cholesterol 112 with an LDL of 63 which is excellent.   4. Bicuspid aortic valve -he has mild stenosis with a mean velocity of 1.4 m/s, peak gradient of 60 mmHg with a mean gradient of 8 mmHg and a calculated aortic valve area 1.1 cm .  Would repeat  echo in July 2018.   5. Sinus bradycardia -secondary to metoprolol and he does have some orthostatic lightheadedness.  We will see how he feels off metoprolol.     Plan  1.  Nuclear stress test and hold metoprolol.  2.  Follow-up with me in about 3 months.  3.  Recheck echo in approximately July 2018.  4.  Discontinue Plavix in July 2018.    Subjective  CC: 47-year-old white gentleman being seen in a post hospital discharge follow-up today.  He used to see Dr. Valero and switching to my care.  Complains of a chest discomfort involving the left side of his chest which is there for years described as a shooting discomfort lasting just seconds sometimes left shoulder area and other times across his chest.  It is not associated with activity or rest or food but he thinks may be anxiety.  This is essentially unchanged.  He does have a lightheadedness which comes on when he is upright.  There is no other angina, syncope, shortness of  "breath, PND, orthopnea or peripheral edema.    Medications  Current Outpatient Prescriptions   Medication Sig     aspirin 81 mg chewable tablet Chew 1 tablet (81 mg total) daily.     atorvastatin (LIPITOR) 80 MG tablet Take 1 tablet (80 mg total) by mouth at bedtime.     buPROPion (WELLBUTRIN SR) 100 MG 12 hr tablet Take 100 mg by mouth 2 (two) times a day.     clopidogrel (PLAVIX) 75 mg tablet Take 1 tablet (75 mg total) by mouth daily.     gabapentin (NEURONTIN) 100 MG capsule Take 100 mg by mouth at bedtime.     metoprolol succinate (TOPROL-XL) 25 MG Take 1 tablet (25 mg total) by mouth at bedtime.     tadalafil (CIALIS) 20 MG tablet Take 20 mg by mouth daily as needed for erectile dysfunction.     omeprazole (PRILOSEC) 10 MG capsule Take 2 capsules (20 mg total) by mouth daily before breakfast.       Objective  /82 (Patient Site: Right Arm, Patient Position: Sitting, Cuff Size: Adult Large)  Pulse 60  Resp 18  Ht 5' 10\" (1.778 m)  Wt (!) 232 lb (105.2 kg)  SpO2 97%  BMI 33.29 kg/m2    General Appearance:    Alert, cooperative, no distress, appears stated age   Head:    Normocephalic, without obvious abnormality, atraumatic   Throat:   Lips, mucosa, and tongue normal; teeth and gums normal   Neck:   Supple, symmetrical, trachea midline, no adenopathy;        thyroid:  No enlargement/tenderness/nodules; no carotid    bruit or JVD   Back:     Symmetric, no curvature, ROM normal, no CVA tenderness   Lungs:     Clear to auscultation bilaterally, respirations unlabored   Chest wall:    No tenderness or deformity   Heart:    Regular rate and rhythm, S1 and S2 normal, 1/6 systolic ejection murmur, non rub   or gallop   Abdomen:     Soft, non-tender, bowel sounds active all four quadrants,     no masses, no organomegaly   Extremities:   Normal, atraumatic, no cyanosis or edema   Pulses:   2+ and symmetric all extremities   Skin:   Skin color, texture, turgor normal, no rashes or lesions     Results    Lab " Results personally reviewed   Lab Results   Component Value Date    CHOL 112 09/07/2017    CHOL 238 (H) 07/31/2017     Lab Results   Component Value Date    HDL 31 (L) 09/07/2017    HDL 30 (L) 07/31/2017     Lab Results   Component Value Date    LDLCALC 63 09/07/2017    LDLCALC 162 (H) 07/31/2017     Lab Results   Component Value Date    TRIG 88 09/07/2017    TRIG 231 (H) 07/31/2017     Lab Results   Component Value Date    WBC 7.8 08/01/2017    HGB 14.2 08/01/2017    HCT 42.3 08/01/2017     08/01/2017     Lab Results   Component Value Date    CREATININE 1.04 09/07/2017    BUN 16 09/07/2017     09/07/2017    K 4.9 09/07/2017    CO2 26 09/07/2017     Review of Systems:   General: WNL  Eyes: WNL  Ears/Nose/Throat: WNL  Lungs: WNL  Heart: WNL  Stomach: WNL  Bladder: WNL  Muscle/Joints: WNL  Skin: WNL  Nervous System: WNL  Mental Health: WNL     Blood: WNL

## 2021-06-14 NOTE — PROGRESS NOTES
ITP ASSESSMENT   Assessment Day: 120 Day -Discharge  Session Number: 29  Precautions: standard  Diagnosis: Stent  Risk Stratification: High  Referring Provider: Dr Loev  EXERCISE  Exercise Assessment: Discharge     6 Minute Walk Test   Pre   Pre Exercise HR: 66                  Pre Exercise BP: 112/74    Peak  Peak HR: 93                 Peak BP: 126/81  Peak feet: 1725  Peak O2 SAT: 98  Peak RPE: 11-12  Peak MPH: 3.27    Symptoms:  Peak Symptoms: none    5 mins. Post  5 Min Post HR: 66  5 Min Post BP: 110/76                         Exercise Plan  Goals Next 30 days    Education Goals: All goals in this section met  Education Goals Met: Patient can state cardiac s/s and appropriate emergency response.;Has system for taking medication.;Medication review.                        Goals Met  90 day ADL'S goals met: has not met goal of wt lose  90 day Leisure goals met: has started using weights but has not met goal of consistant strength training     60 day ADL'S goals met: Pt has raked lawn/fall yard cleanup - goal met  60 day Leisure goals met: Pt has not lost weight - goal in progress    60 Day Progression: Pt is motivated to lose weight, will start home strength program and increase aerobic exercise time    30 day ADL'S goals met: Has mowed lawn but did get tired   30 day Leisure goals met: Has met goal of hunting and has lost 8#      Initial ADL's goals met: Walked 1-2/day for 15-20 minutes    Intial Work goals met: Tolerated full time work  Initial Progression: Has not mowed lawn, progressed toward other goals    Exercise Prescription  Resistive Training?: Yes    Current Exercise (mins/week): 240    Interventions  Home Exercise:  Mode: walk/ nordic trac  Frequency: 5 days/week  Duration: 30-60 '    Education Material : Educational videos;Provide written material;Individual education and counseling;Offer educational classes    Education Completed  Exercise Education Completed: Cardiac Anatomy;Signs and  "Symptoms;Medication review;RPE;Emergency Plan;Home Exercise;Warm up/cool down;FITT Principles;BP/HR Reponse to exercise;Benefits of Exercise;End point of exercise            Exercise Follow-up/Discharge  Follow up/Discharge: reviewed home ex plan NUTRITION  Nutrition Assessment: Discharge    Nutrition Risk Factors:  Nutrition Risk Factors: Dyslipidemia;Overweight    Nutrition Plan  Interventions  Nutrition Interventions: Diet consult;Diet class;Therapist/Patient discussion;Educational videos;Provide with written material  Rate Your Plate Score: 51    Education Completed  Nutrition Education Completed: Low Saturated fat diet;Risk factor overview;Low sodium diet;Weight management    Goals  Nutrition Goals (Next 30 days): Patient will follow a low sodium diet;Patient will follow a low saturated fat diet;Patient will lose weight    Goals Met  Nutrition Goals Met: Patient can identify their risk factors for CAD;Patient follows a low sodium diet;Patient states following a low saturated fat diet;Completed Nutritional Risk Screen;Provided Rate your Plate Survey;Reviewed Dietitian schedule    Height, Weight, and  BMI  Weight: 239 lb (108.4 kg)  Height: 5' 10\" (1.778 m)  BMI: 34.29    Nutrition Follow-up  Follow-up/Discharge: Reviewed heart healthy diet. Pt is motivated to lose weight       Other Risk Factors  Other Risk Factor Assessment: Discharge    HTN Risk Factor: Hypertension    Pre Exercise BP: 126/68  Post Exercise BP: 124/68    Hypertension Plan  Goals  HTN Goals: Patient demonstrates understanding of HTN, no goals identified for the next 30 days    Goals Met  HTN Goals Met: Follow low sodium diet;Take medication as prescribed;Exercises regularly    HTN Interventions  HTN Interventions: Diet consult;Therapist/patient discussion;Provide written material;Offer educational videos;Offer educational classes    HTN Education Completed  HTN Education Completed: Low sodium diet;Medication review;Risk factor " overview    Tobacco Risk Factor: Tobacco    Initial Use:: Occasional Cigar  Current Use:: smoke free    Tobacco Plan  Tobacco Goals  Tobacco Goals: Patient demonstrates understanding of tobacco cessation, no goals identified for the next 30 days    Goals Met  Tobacco Goals Met: Patient remain tobacco free    Tobacco Interventions  Tobacco Interventions: Therapist/patient discussion;Provide written material;Offer educational videos;Offer class on risk factor modification    Tobacco Education Completed  Tobacco Education Completed: Identifies triggers;Health benefits of tobacco cessation;Risk factor overview    Risk Factor Follow-up   Follow-up/Discharge: Reviewed risk factors and home plan   PSYCHOSOCIAL  Psychosocial Assessment: Discharge     Parmjit MONTESINOS Q of L Summary Score: 17    NAILA-D Score: 9    Psychosocial Risk Factor: Stress    Psychosocial Plan  Interventions  Interventions: Offer educational videos and classes;Provide written material;Individual education and counseling;Offer Spiritual Care consult    Education Completed  Education Completed: Relaxation/Coping Techniques;Effects of stress on body    Goals      Goals Met  Goals Met: Improvement in Parmjit MONTESINOS score    Psychosocial Follow-up  Follow-up/Discharge: feels this is managable            No Data Recorded    Signature: _____________________________________________________________    Date: __________________    Time: __________________See Doc Flowsheet

## 2021-06-14 NOTE — PATIENT INSTRUCTIONS - HE
Mr Taurus Cotto,  I enjoyed visiting with you again today.  I am glad to hear you are doing well.  Per our conversation let us check the echo for the valve.  I will also check the stress test but hold the metoprolol 2 nights before.  I will plan on seeing you 1 year or sooner if needed.  Masoud Albarran

## 2021-06-15 NOTE — TELEPHONE ENCOUNTER
Taurus LEMUS Campbell  [address] [address]  [phone] (home) [phone] (work)    Primary cardiologist:  BALAJI   PCP:  Nita Ratliff MD  Procedure:  Cor angio poss pci   H&P completed by:  Melinda Baugh, 2/26  Case MD:  KRYSTAL/SAMARIA  Admit date and time:  3/2/21  Case start time:  0900  Ordering MD: BALAJI  Diagnosis:  Abnormal stress test  Anticoagulation: None  CPAP: No  Bypass Grafts: No  Renal Issues: No  Allergies: confirmed allergy list  Diabetic?: No  Device?: No      Angiogram Teaching    Reason for Visit:  Telephone call to discuss pre-procedure education in preparation for:  Cor angio poss pci    Procedure Prep:  EKG results obtained, dated: 3/1  Pertinent test results obtained - Viewable in Epic, dated:Angio 7/2017  Hemogram results obtained: on admission  Basic Metabolic Panel results obtained: on admission  Lipid Profile results obtained: on admission      Pre-procedure instructions  Patient instructed to be NPO after midnight.  Patient instructed to arrange for transportation home following procedure.  Patient instructed to have a responsible adult with them for 24 hours post-procedure.  Post-procedure follow up process.  Conscious sedation discussed.      Pre-procedure medication instructions  Patient instructed on antiplatelet medication.  Continue medications as scheduled, with a small amount of water on the day of the procedure unless indicated.  Patient instructed to take 325 mg of Aspirin am of procedure: Yes  Other medication: instructed to take 325 mg Asprin the morning of the procedure, pt takes other medications in the evening.     *PATIENTS RECORDS AVAILABLE IN [facility] UNLESS OTHERWISE INDICATED*    *Order set was entered on this date: 2/26/2021      Patient Active Problem List   Diagnosis     Chest pain     Sinus bradycardia     Obesity (BMI 35.0-39.9 without comorbidity)     Bicuspid aortic valve     Coronary artery disease     Primary hypertension     Mixed  hyperlipidemia     S/P hernia surgery     Neck pain     Acute chest pain     Abnormal cardiovascular stress test     Chronic stable angina (H)     Coronary artery disease due to lipid rich plaque       Current Outpatient Medications   Medication Sig Dispense Refill     aspirin 81 mg chewable tablet Chew 1 tablet (81 mg total) daily.  0     atorvastatin (LIPITOR) 80 MG tablet Take 1 tablet (80 mg total) by mouth at bedtime. 30 tablet 0     buPROPion (WELLBUTRIN SR) 100 MG 12 hr tablet Take 100 mg by mouth 2 (two) times a day.              metoprolol succinate (TOPROL-XL) 25 MG Take 1 tablet (25 mg total) by mouth daily. 30 tablet 0     omeprazole (PRILOSEC) 20 MG capsule Take 1 capsule (20 mg total) by mouth at bedtime. 30 capsule 0     ranolazine (RANEXA) 1,000 mg SR tablet Take 1 tablet (1,000 mg total) by mouth 2 (two) times a day. 60 tablet 0     tadalafil (CIALIS ORAL) Take 6 mg by mouth as needed.       No current facility-administered medications for this visit.        Allergies   Allergen Reactions     Lisinopril Cough       Plan  Pt's wife will be   COVID test from pt's PCP on 2/26  Patient ready for procedure    YIMI Owusu

## 2021-06-15 NOTE — TELEPHONE ENCOUNTER
----- Message from Chelita Newell sent at 2/25/2021 11:30 AM CST -----  Regarding: TANYA ORDERING  CORS POSS PCI WITH [facility]     3/2/21 9:00 AM ADMIT     H&P: 2/26 WITH PMD, COVID TEST WILL ALSO BE DONE AT THIS APPT. ORDER FAXED TO [facility]     NO ALERTS     Thanks!   Alpa

## 2021-06-15 NOTE — PATIENT INSTRUCTIONS - HE
Taurus Cotto,    It was a pleasure to see you today at the [facility].     My recommendations after this visit include:  - If you have any questions or concerns, please call [phone] to talk with my nurse.    Christen Bullock CNP        Medication     o Take all your medications as prescribed  o Do not stop any medications without talking with a healthcare provider    Exercise      o Physical activity is important for overall health  o Set a goal of 150 minutes of exercise each week  o For example, 30 minutes of exercise 5 days each week.    o These 30 minutes can be broken into shorter periods of 15 minutes twice daily or 10 minutes three times daily  o Start any exercise program slowly and work towards the goal of 150 minutes each week  o For example, you may start with 10 minutes and plan to add a few minutes each week as you get stronger   o Examples of exercise include walking, swimming, or biking  o Remember to stretch and stay hydrated with exercise    Diet     o A heart healthy diet includes:  o A variety of fruits and vegetables  o Whole grains  o Low-fat dairy (fat-free, 1% fat, and low-fat)  o Lean meats and poultry without skin   o Fish (eat fish 2 times each week)  o Nuts  o Limit saturated fat to about 13 grams each day (based on a 2000 calorie diet)  o Limit red meat  o Limit sugars (sweets and sugary beverages)  o Limit your portion sizes  o Do not add salt to your food when cooking or at the table  o Limit alcohol intake (no more than 1 drink each day for women or 2 drinks each day for men)    Weight Loss     o Work on losing weight with diet and exercise  o You BMI (body mass index) should be between 18.5-24.9  o This is a calculation of your weight and height  o Please ask your healthcare provider for your BMI    Manage Other Chronic Health Conditions     o Control cholesterol  o Eat a diet low in saturated fat  o Exercise   o Take a statin medication as  prescribed  o Manage blood pressure  o Eat a diet low in sodium  o Exercise  o Reduce stress  o Lose weight   o Take blood pressure medications as prescribed  o Control blood sugars if diabetic  o Monitor sugars and carbohydrates in your diet  o Lose weight   o Take diabetes medications as prescribed  o Follow-up with your primary care provider to make sure your blood sugars are well controlled    Stress Reduction     o Find time each day to relax  o Reading, listening to music, yoga, meditation, exercise, spending time with friends and family, volunteering   o Get 6-8 hours of sleep each night    Smoking Cessation     o Smoking causes numerous health problems including coronary artery disease  o It is never too late to quit  o Set realistic goals for quitting  o Decrease the number of cigarettes used each week  o Use nicotine gum or patches to help you quit    Information from the American Heart Association.  Please visit their website at www.heart.org

## 2021-06-15 NOTE — TELEPHONE ENCOUNTER
----- Message from Tequila Coello sent at 3/12/2021  1:45 PM CST -----   The medication or refill issue is below:    Primary Cardiologist: Dr. Loo    Medication: ticagrelor (BRILINTA) 90 mg Tab, ezetimibe (ZETIA) 10 mg tablet    Issue / Concern: Refills- please call patient once it is submitted    Preferred Pharmacy/City: [facility] - [address] Phone Number for Patient: [phone]    Additional Info:

## 2021-06-15 NOTE — PROGRESS NOTES
Please contact this 50-year-old gentleman of mine and let him know that his echo is abnormal, in consideration of the small abnormality seen on stress test would like him to get coronary angiography and possible intervention.   LF

## 2021-06-15 NOTE — PROGRESS NOTES
[facility] Follow-up Note    Assessment & Plan        1. Coronary artery disease due to lipid rich plaque -angiography July 31, 2017 showed normal left main, proximal to mid left anterior descending 40% lesion, normal circumflex, and right coronary artery with distal 95% lesion following a proximal 50-60% lesion which received a drug-coated stent.  Plan is for aspirin and Plavix until at least July and then aspirin thereafter.  He is having vague chest discomfort which sounds atypical but given this I arranged for nuclear exercise stress test off metoprolol in October which showed no ischemia or scar.  It may be small vessel disease and he is on Cialis and cannot use nitrates.  Given this I will try Ranexa 500 mg by mouth twice a day provided safe with dialysis.   2. Bicuspid aortic valve - -he has mild stenosis with a mean velocity of 1.4 m/s, peak gradient of 60 mmHg with a mean gradient of 8 mmHg and a calculated aortic valve area 1.1 cm .  Would repeat  echo now given continued chest pain.   3. Dyslipidemia-cholesterol 112 with an LDL of 63 which is excellent.    4. Obesity (BMI 35.0-39.9 without comorbidity) -weight loss.   5. Sinus bradycardia -secondary to metoprolol and he does have some orthostatic lightheadedness.       Plan  1.  Check echo given his history of bicuspid aortic valve.  2.  Weight loss.  3.  Try Ranexa which is not contraindicated with sialoliths.  4.  Follow-up with me in 6 months given all these issues.  5.  If Ranexa works he will call me and I will give him a 90 day prescription.    Subjective  CC: 47-year-old white gentleman here for follow-up today.  He still has a vague discomfort or stabbing in his chest which comes on at rest, after drinking coffee, or with activity, it will go away with rest.  He might get short of breath with this but there is no fatigue, syncope, dizziness, PND, orthopnea or peripheral edema.    Medications  Current Outpatient Prescriptions  "  Medication Sig     aspirin 81 mg chewable tablet Chew 1 tablet (81 mg total) daily.     atorvastatin (LIPITOR) 80 MG tablet Take 1 tablet (80 mg total) by mouth at bedtime.     buPROPion (WELLBUTRIN SR) 100 MG 12 hr tablet Take 100 mg by mouth 2 (two) times a day.     clopidogrel (PLAVIX) 75 mg tablet Take 1 tablet (75 mg total) by mouth daily.     gabapentin (NEURONTIN) 100 MG capsule Take 100 mg by mouth at bedtime.     metoprolol succinate (TOPROL-XL) 25 MG Take 1 tablet (25 mg total) by mouth at bedtime.     omeprazole (PRILOSEC) 10 MG capsule Take 2 capsules (20 mg total) by mouth daily before breakfast.     tadalafil (CIALIS) 20 MG tablet Take 20 mg by mouth daily as needed for erectile dysfunction.     ranolazine (RANEXA) 500 MG 12 hr tablet Take 1 tablet (500 mg total) by mouth 2 (two) times a day.       Objective  /70 (Patient Site: Left Arm, Patient Position: Sitting, Cuff Size: Adult Large)  Pulse 64  Resp 16  Ht 5' 10\" (1.778 m)  Wt (!) 235 lb (106.6 kg)  BMI 33.72 kg/m2    General Appearance:    Alert, cooperative, no distress, appears stated age   Head:    Normocephalic, without obvious abnormality, atraumatic   Throat:   Lips, mucosa, and tongue normal; teeth and gums normal   Neck:   Supple, symmetrical, trachea midline, no adenopathy;        thyroid:  No enlargement/tenderness/nodules; no carotid    bruit or JVD   Back:     Symmetric, no curvature, ROM normal, no CVA tenderness   Lungs:     Clear to auscultation bilaterally, respirations unlabored   Chest wall:    No tenderness or deformity   Heart:    Regular rate and rhythm, S1 and S2 normal, no murmur, rub   or gallop   Abdomen:     Soft, non-tender, bowel sounds active all four quadrants,     no masses, no organomegaly   Extremities:   Normal, atraumatic, no cyanosis or edema   Pulses:   2+ and symmetric all extremities   Skin:   Skin color, texture, turgor normal, no rashes or lesions     Results    Lab Results personally reviewed "   Lab Results   Component Value Date    CHOL 112 09/07/2017    CHOL 238 (H) 07/31/2017     Lab Results   Component Value Date    HDL 31 (L) 09/07/2017    HDL 30 (L) 07/31/2017     Lab Results   Component Value Date    LDLCALC 63 09/07/2017    LDLCALC 162 (H) 07/31/2017     Lab Results   Component Value Date    TRIG 88 09/07/2017    TRIG 231 (H) 07/31/2017     Lab Results   Component Value Date    WBC 7.8 08/01/2017    HGB 14.2 08/01/2017    HCT 42.3 08/01/2017     08/01/2017     Lab Results   Component Value Date    CREATININE 1.04 09/07/2017    BUN 16 09/07/2017     09/07/2017    K 4.9 09/07/2017    CO2 26 09/07/2017     Review of Systems:   General: WNL  Eyes: WNL  Ears/Nose/Throat: WNL  Lungs: WNL  Heart: Chest Pain  Stomach: WNL  Bladder: WNL  Muscle/Joints: WNL  Skin: WNL  Nervous System: WNL  Mental Health: WNL     Blood: WNL

## 2021-06-16 PROBLEM — Q23.81 BICUSPID AORTIC VALVE: Status: ACTIVE | Noted: 2017-07-29

## 2021-06-16 PROBLEM — I25.10 CORONARY ARTERY DISEASE DUE TO CALCIFIED CORONARY LESION: Status: ACTIVE | Noted: 2021-03-02

## 2021-06-16 PROBLEM — I25.84 CORONARY ARTERY DISEASE DUE TO CALCIFIED CORONARY LESION: Status: ACTIVE | Noted: 2021-03-02

## 2021-06-16 PROBLEM — G47.33 OSA (OBSTRUCTIVE SLEEP APNEA): Status: ACTIVE | Noted: 2021-03-29

## 2021-06-16 PROBLEM — I10 ESSENTIAL HYPERTENSION: Status: ACTIVE | Noted: 2018-09-05

## 2021-06-16 NOTE — TELEPHONE ENCOUNTER

## 2021-06-16 NOTE — TELEPHONE ENCOUNTER
Noted- Pt has an appt with [facility] on 3/29/2021. The need for repeat LIP can be discussed at this appt and 3/30 Lab appt can subsequently be cancelled or patient can go and order placed if needed. -demetri

## 2021-06-16 NOTE — PATIENT INSTRUCTIONS - HE
Mr Taurus Cotto,  I enjoyed visiting with you again today.  I am glad to hear you are doing well.  Per our conversation we will check the cholesterol today and if not under good control consider you for one of our studies.  We also might consider the injectable prior authorization.  Continue to work on weight loss.  I will plan on seeing you 6 months or sooner if needed.  Masoud Albarran

## 2021-06-16 NOTE — TELEPHONE ENCOUNTER
Taurus has an upcoming lab appointment for Lipids.  He just had one done the beginning of March.  If he still needs the test it will need to be ordered.  If not, please advise him.    Thanks,     Imani Li

## 2021-06-17 NOTE — TELEPHONE ENCOUNTER
----- Message from ERIN Leonardo Junior sent at 5/10/2021  2:36 PM CDT -----  Regarding: BALAJI Patient  General phone call:    Caller: Patient   Primary cardiologist: BALAJI  Detailed reason for call: Patient would like to speak to RN about medication and side effects he's experiencing   Best phone number: [phone]  Best time to contact: Any  Ok to leave a detailed message? Yes  Device? No     Additional Info:       Patient complains of lightheadedness associated with lower HR and BP. He states he's been running in the 90s to low 100s/60-70 at home. He reports a 20 pound weight loss since his angiogram in March. He wonders if his blood pressure meds need to be re-adjusted. Additionally, he complains of significant bruising due to Brilinta. He was on Plavix following his first stent placement in 2017 and feels this medication worked better for him. He was wondering if it would be acceptable to switch back to Plavix in place of Brilinta.    Dr. Albarran, First, patient complains of lightheadedness and dizziness especially with position changes. He reports a 20 pound weight loss since his most recent angiogram in March. BP at home runs around 90 to low 100s/60-70. He is wondering if any of his BP meds need to be adjusted? Secondly, pt c/o several larger bruises causing pain due to his Brilinta medication. He reports that he was placed on Plavix following 2017 angiogram and felt this worked better for him - he doesn't remember bruising this significantly. He is wondering if It would be acceptable to be taken off Brilinta and placed on plavix instead?

## 2021-06-17 NOTE — TELEPHONE ENCOUNTER
Telephone Encounter by Michelle Owusu RN at 5/11/2021  8:50 AM     Author: Michelle Owusu RN Service: -- Author Type: Registered Nurse    Filed: 5/11/2021  8:51 AM Encounter Date: 5/10/2021 Status: Signed    : Michelle Owusu RN (Registered Nurse)       Bhumika Albarran MD Larsen, Kellie C, RN   Caller: Unspecified (Yesterday,  2:36 PM)             First, we need to confirm what medicines he is taking, I see metoprolol 25 mg a day as well as ticagrelor as well as aspirin?  With that said, if all above correct, I would asked him to cut the metoprolol in half and take a half once a day.  In addition I would asked that he go to the aspirin every other day.  Let us see what happens, he should understand that the interventionalists placed him on ticagrelor because they felt he needed it, we can always switch to Plavix but let us try these measures first.  LF      Left message for patient to return call to discuss results. -kcl

## 2021-06-17 NOTE — TELEPHONE ENCOUNTER
Received return call from patient. He is agreeable to med changes. Med list updated. He will monitor her BP at home and call with any further concerns. -kcl

## 2021-06-20 NOTE — PROGRESS NOTES
[facility] Follow-up Note    Assessment & Plan        1. Coronary artery disease due to lipid rich plaque -angiography July 31, 2017 showed normal left main, proximal to mid left anterior descending 40% lesion, normal circumflex, and right coronary artery with distal 95% lesion following a proximal 50-60% lesion which received a drug-coated stent.  Plan is for aspirin indefinitely and now he is off.  He is having vague chest discomfort which sounds atypical but given this I arranged for nuclear exercise stress test off metoprolol in October 2017 which showed no ischemia or scar.  It may be small vessel disease and he is on Cialis and cannot use nitrates.  Given this he is using Ranexa 500 mg by mouth twice with no issues and improved chest discomfort.   2. Dyslipidemia -LDL excellent at 73 from April of this year.   3. Bicuspid aortic valve -repeat echo in February showed dimensionless index 0.5, valve area 1.4 cm , mean gradient 9 mmHg and peak gradient of 16 mmHg.  Suggest rechecking echo in 2 years.   4. Sinus bradycardia -secondary to beta-blocker.   5. Essential hypertension -I rechecked blood pressure with large cuff was 120/70.  After 2 years out from cardiac event in July 2019 with switch metoprolol over to lisinopril.   6.  Obstructive sleep apnea-CPAP mask.    Plan  1.  Work on weight loss.  2.  Can take an occasional ibuprofen.  3.  Follow-up with me in July 2019 and at that point time change metoprolol over lisinopril.    Subjective  CC: 48-year-old white gentleman here for a follow-up today.  He is getting along well with only vague chest discomfort that comes on after spicy foods.  He is working out without any chest discomfort, palpitations, shortness of breath, PND, orthopnea or peripheral edema.    Medications  Current Outpatient Prescriptions   Medication Sig     aspirin 81 mg chewable tablet Chew 1 tablet (81 mg total) daily.     atorvastatin (LIPITOR) 80 MG tablet Take 1 tablet  "(80 mg total) by mouth at bedtime.     buPROPion (WELLBUTRIN SR) 100 MG 12 hr tablet Take 100 mg by mouth Daily at 8:00 am..     gabapentin (NEURONTIN) 100 MG capsule Take 100 mg by mouth at bedtime.     metoprolol succinate (TOPROL-XL) 25 MG Take 1 tablet (25 mg total) by mouth at bedtime.     omeprazole (PRILOSEC) 10 MG capsule Take 2 capsules (20 mg total) by mouth daily before breakfast.     RANEXA 500 mg 12 hr tablet TAKE 1 TABLET TWICE A DAY     tadalafil (CIALIS) 20 MG tablet Take 20 mg by mouth daily as needed for erectile dysfunction.       Objective  /70  Pulse (!) 56  Resp 10  Ht 5' 10\" (1.778 m)  Wt (!) 228 lb (103.4 kg)  BMI 32.71 kg/m2    General Appearance:    Alert, cooperative, no distress, appears stated age   Head:    Normocephalic, without obvious abnormality, atraumatic   Throat:   Lips, mucosa, and tongue normal; teeth and gums normal   Neck:   Supple, symmetrical, trachea midline, no adenopathy;        thyroid:  No enlargement/tenderness/nodules; no carotid    bruit or JVD   Back:     Symmetric, no curvature, ROM normal, no CVA tenderness   Lungs:     Clear to auscultation bilaterally, respirations unlabored   Chest wall:    No tenderness or deformity   Heart:    Regular rate and rhythm, S1 and S2 normal, no murmur, rub   or gallop   Abdomen:     Soft, non-tender, bowel sounds active all four quadrants,     no masses, no organomegaly   Extremities:   Normal, atraumatic, no cyanosis or edema   Pulses:   2+ and symmetric all extremities   Skin:   Skin color, texture, turgor normal, no rashes or lesions     Results    Lab Results personally reviewed   Lab Results   Component Value Date    CHOL 129 04/05/2018    CHOL 112 09/07/2017     Lab Results   Component Value Date    HDL 39 (L) 04/05/2018    HDL 31 (L) 09/07/2017     Lab Results   Component Value Date    LDLCALC 73 04/05/2018    LDLCALC 63 09/07/2017     Lab Results   Component Value Date    TRIG 83 04/05/2018    TRIG 88 " 09/07/2017     Lab Results   Component Value Date    WBC 7.8 08/01/2017    HGB 14.2 08/01/2017    HCT 42.3 08/01/2017     08/01/2017     Lab Results   Component Value Date    CREATININE 1.03 04/05/2018    BUN 20 04/05/2018     04/05/2018    K 5.1 (H) 04/05/2018    CO2 26 04/05/2018     Review of Systems:   General: WNL  Eyes: WNL  Ears/Nose/Throat: WNL  Lungs: WNL  Heart: WNL  Stomach: WNL  Bladder: WNL  Muscle/Joints: WNL  Skin: WNL  Nervous System: WNL  Mental Health: WNL     Blood: Easy Bruising

## 2021-06-21 NOTE — LETTER
Letter by Bhumika Albarran MD at      Author: Bhumika Albarran MD Service: -- Author Type: --    Filed:  Encounter Date: 3/29/2021 Status: (Other)         Taurus Cotto  [address] [facility] [address]     March 29, 2021     Dear Mr. Cotto,    Below are the results from your recent visit:    Resulted Orders   Lipid Profile   Result Value Ref Range    Triglycerides 85 <=149 mg/dL    Cholesterol 99 <=199 mg/dL    LDL Calculated 52 <=129 mg/dL    HDL Cholesterol 30 (L) >=40 mg/dL    Patient Fasting > 8hrs? Yes    Creatinine, serum   Result Value Ref Range    Creatinine 1.17 0.70 - 1.30 mg/dL    GFR MDRD Af Amer >60 >60 mL/min/1.73m2    GFR MDRD Non Af Amer >60 >60 mL/min/1.73m2     The test results show that your current cholesterol is excellent and kidney function is fantastic.  Keep up the good work and work on weight loss.   Please call with questions or contact us using [employer].    Sincerely,        Electronically signed by Bhumika Albarran MD

## 2021-06-26 ENCOUNTER — HEALTH MAINTENANCE LETTER (OUTPATIENT)
Age: 51
End: 2021-06-26

## 2021-06-28 NOTE — PROGRESS NOTES
Progress Notes by Bhumika Albarran MD at 10/28/2019  8:10 AM     Author: Bhumika Albarran MD Service: -- Author Type: Physician    Filed: 10/28/2019  8:47 AM Encounter Date: 10/28/2019 Status: Signed    : Bhumika Albarran MD (Physician)           [facility]  [facility] Clinic Follow-up Note    Assessment & Plan        1. Coronary artery disease due to lipid rich plaque -angiography July 31, 2017 showed normal left main, proximal to mid left anterior descending 40% lesion, normal circumflex, and right coronary artery with distal 95% lesion following a proximal 50-60% lesion which received a drug-coated stent.  Plan is for aspirin indefinitely and now he is off Plavix. Nuclear exercise stress test off metoprolol in October 2017 showed no ischemia or scar.  It may be small vessel disease and he is on Cialis and cannot use nitrates.  Given this he is using Ranexa which was increased to 1000 mg twice a day and he has had no recurrence of chest discomfort.  If he does I will then most likely pursue repeat angiography.    2. Bicuspid aortic valve - -based on echo from February of this year dimensionless index 0.5 with a valve area 1.4 cm , mean gradient of 9 mmHg, peak gradient of 16 mmHg and recheck echo now.   3. Mixed hyperlipidemia -currently excellent with a cholesterol 146 and LDL of 79.  Mildly increased ALT and will recheck in 6 months with total bilirubin recheck as well.   4. Primary hypertension -under excellent control currently on metoprolol.     Plan  1.  Recheck echo for bicuspid valve.  2.  Recheck lipids and LFTs in about 6 months.  3.  Follow-up me in 6 months or sooner if needed.    Subjective  CC: 49-year-old white gentleman being seen in follow-up today.  He has been golfing over the summer and been active.  No syncope, dizziness, fatigue, chest discomfort, palpitations, shortness breath, PND, orthopnea or peripheral edema.    Medications  Current Outpatient Medications   Medication  Sig   ? aspirin 81 mg chewable tablet Chew 1 tablet (81 mg total) daily.   ? atorvastatin (LIPITOR) 80 MG tablet Take 1 tablet (80 mg total) by mouth at bedtime.   ? buPROPion (WELLBUTRIN SR) 100 MG 12 hr tablet Take 100 mg by mouth 2 (two) times a day.          ? metoprolol succinate (TOPROL-XL) 25 MG Take 1 tablet (25 mg total) by mouth daily.   ? omeprazole (PRILOSEC) 20 MG capsule Take 1 capsule (20 mg total) by mouth at bedtime.   ? ranolazine (RANEXA) 1,000 mg SR tablet Take 1 tablet (1,000 mg total) by mouth 2 (two) times a day.   ? tadalafil (CIALIS ORAL) Take 6 mg by mouth as needed.       Objective  /82 (Patient Site: Right Arm, Patient Position: Sitting, Cuff Size: Adult Regular)   Pulse 68   Resp 16   Wt (!) 242 lb (109.8 kg)   BMI 34.72 kg/m      General Appearance:    Alert, cooperative, no distress, appears stated age   Head:    Normocephalic, without obvious abnormality, atraumatic   Throat:   Lips, mucosa, and tongue normal; teeth and gums normal   Neck:   Supple, symmetrical, trachea midline, no adenopathy;        thyroid:  No enlargement/tenderness/nodules; no carotid    bruit or JVD   Back:     Symmetric, no curvature, ROM normal, no CVA tenderness   Lungs:     Clear to auscultation bilaterally, respirations unlabored   Chest wall:    No tenderness or deformity   Heart:    Regular rate and rhythm, S1 and S2 normal, 1/6 systolic ejection murmur, no rub   or gallop   Abdomen:     Soft, non-tender, bowel sounds active all four quadrants,     no masses, no organomegaly   Extremities:   Normal, atraumatic, no cyanosis or edema   Pulses:   2+ and symmetric all extremities   Skin:   Skin color, texture, turgor normal, no rashes or lesions     Results    Lab Results personally reviewed   Lab Results   Component Value Date    CHOL 146 09/09/2019    CHOL 129 04/05/2018     Lab Results   Component Value Date    HDL 44 09/09/2019    HDL 39 (L) 04/05/2018     Lab Results   Component Value Date     LDLCALC 79 09/09/2019    LDLCALC 73 04/05/2018     Lab Results   Component Value Date    TRIG 113 09/09/2019    TRIG 83 04/05/2018     Lab Results   Component Value Date    WBC 7.2 06/16/2019    HGB 13.9 (L) 06/16/2019    HCT 42.4 06/16/2019     06/16/2019     Lab Results   Component Value Date    CREATININE 1.16 09/09/2019    BUN 21 09/09/2019     09/09/2019    K 4.8 09/09/2019    CO2 26 09/09/2019     Review of Systems:   General: WNL  Eyes: WNL  Ears/Nose/Throat: WNL  Lungs: WNL  Heart: WNL  Stomach: WNL  Bladder: WNL  Muscle/Joints: WNL  Skin: WNL  Nervous System: WNL  Mental Health: WNL     Blood: WNL

## 2021-06-30 NOTE — PROGRESS NOTES
Progress Notes by Bhumika Albarran MD at 3/29/2021  9:10 AM     Author: Bhumika Albarran MD Service: -- Author Type: Physician    Filed: 3/29/2021 10:03 AM Encounter Date: 3/29/2021 Status: Signed    : Bhumika Albarran MD (Physician)           [facility]  Heart [facility] Follow-up Note    Assessment & Plan        1. Coronary artery disease due to calcified coronary lesion -angiography March 2, 2021 showed normal left main, mid LAD 60% stenosis which was intervened upon with a drug-coated stent, first diagonal 25% stenosis, proximal circumflex 20% with a mid 30% lesion and second obtuse marginal artery with a 30% lesion, and right coronary artery with a proximal 50% and mid 50% lesion.  There is a patent distal stent.  Work on prevention.   2. Bicuspid aortic valve  -based on echo from January 2020 dimensionless index 0.5 with a valve area 1.6 cm , mean gradient of 9 mmHg, peak gradient of 16 mmHg mean velocity 1.4 m/s.  Most recent echo however read as tricuspid with aortic insufficiency, dimensionless index 0.6 with mean gradient of 8 and peak gradient of 14 with mean velocity 1.4 m/s.  May need cardiac MRI in the future.  This was in February 2021 and consider cardiac MRI February 2022.   3. Obesity (BMI 35.0-39.9 without comorbidity)-work on weight loss   4. Mixed hyperlipidemia-cholesterol 135 with LDL 79 from March of this year and now on Zetia and getting this rechecked.  If numbers not to acceptable consider PCSK9 inhibitor or possibly one of our research studies.   5. Essential hypertension-under good control.   6. IDA (obstructive sleep apnea)-nightly CPAP which she is working with.     Plan  1.  Fasting lipids today, if need be consider adding PCSK9 inhibitor or research study.  2.  Follow with me in about 6 months.  3.  Consider cardiac MRI in February 2022 to look at bicuspid valve or not.  4.  Continue to work on weight loss.    Subjective  CC: 50-year-old white gentleman being seen  "in follow-up today.  Since I saw him he had increased chest discomfort, prompted stress test which showed ischemia, prompting intervention on the LAD.  He has a vague atypical chest discomfort, surface type sensation, and on for 5 minutes or less, onset at rest, not with activity, nondescript without shortness of breath.  In addition, he does still admit to shortness of breath and heavy activity but this is essentially unchanged.  No retrosternal effort related angina, palpitations, PND, orthopnea, syncope, dizziness or peripheral edema.    Medications  Current Outpatient Medications   Medication Sig   ? aspirin 81 mg chewable tablet Chew 1 tablet (81 mg total) daily.   ? atorvastatin (LIPITOR) 80 MG tablet Take 1 tablet (80 mg total) by mouth at bedtime.   ? buPROPion (WELLBUTRIN SR) 100 MG 12 hr tablet Take 100 mg by mouth 2 (two) times a day.          ? ezetimibe (ZETIA) 10 mg tablet Take 1 tablet (10 mg total) by mouth daily.   ? melatonin 10 mg Tab Take 10 mg by mouth at bedtime.   ? metoprolol succinate (TOPROL-XL) 25 MG Take 1 tablet (25 mg total) by mouth daily.   ? omeprazole (PRILOSEC) 20 MG capsule Take 1 capsule (20 mg total) by mouth at bedtime.   ? polyvinyl alcohol/povidone (CLEAR EYES NATURAL TEARS OPHT) Apply 2 drops to eye 3 (three) times a day as needed.   ? ranolazine (RANEXA) 1,000 mg SR tablet Take 1 tablet (1,000 mg total) by mouth 2 (two) times a day.   ? sodium chloride (OCEAN) 0.65 % nasal spray Apply 2 sprays into each nostril as needed for congestion.   ? tadalafiL (CIALIS) 20 MG tablet Take 20 mg by mouth daily as needed.   ? ticagrelor (BRILINTA) 90 mg Tab Take 1 tablet (90 mg total) by mouth 2 (two) times a day. Dose to start tomorrow morning       Objective  /72 (Patient Site: Left Arm, Patient Position: Sitting, Cuff Size: Adult Large)   Pulse 60   Resp 16   Ht 5' 10\" (1.778 m)   Wt (!) 237 lb (107.5 kg)   BMI 34.01 kg/m      General Appearance:    Alert, cooperative, no " distress, appears stated age   Head:    Normocephalic, without obvious abnormality, atraumatic   Throat:   Lips, mucosa, and tongue normal; teeth and gums normal   Neck:   Supple, symmetrical, trachea midline, no adenopathy;        thyroid:  No enlargement/tenderness/nodules; no carotid    bruit or JVD   Back:     Symmetric, no curvature, ROM normal, no CVA tenderness   Lungs:     Clear to auscultation bilaterally, respirations unlabored   Chest wall:    No tenderness or deformity   Heart:    Regular rate and rhythm, S1 and S2 normal, 1/6 systolic ejection murmur, no rub   or gallop   Abdomen:     Soft, non-tender, bowel sounds active all four quadrants,     no masses, no organomegaly   Extremities:   Normal, atraumatic, no cyanosis or edema   Pulses:   2+ and symmetric all extremities   Skin:   Skin color, texture, turgor normal, no rashes or lesions     Results    Lab Results personally reviewed   Lab Results   Component Value Date    CHOL 135 03/02/2021    CHOL 123 12/23/2020     Lab Results   Component Value Date    HDL 36 (L) 03/02/2021    HDL 36 (L) 12/23/2020     Lab Results   Component Value Date    LDLCALC 79 03/02/2021    LDLCALC 59 12/23/2020     Lab Results   Component Value Date    TRIG 102 03/02/2021    TRIG 138 12/23/2020     Lab Results   Component Value Date    WBC 8.1 03/03/2021    HGB 12.8 (L) 03/03/2021    HCT 38.4 (L) 03/03/2021     03/03/2021     Lab Results   Component Value Date    CREATININE 0.94 03/03/2021    BUN 21 03/03/2021     03/03/2021    K 3.9 03/03/2021    CO2 23 03/03/2021     Review of Systems:   General: WNL  Eyes: WNL  Ears/Nose/Throat: WNL  Lungs: WNL  Heart: WNL  Stomach: WNL  Bladder: WNL  Muscle/Joints: WNL  Skin: WNL  Nervous System: WNL  Mental Health: WNL     Blood: WNL

## 2021-06-30 NOTE — PROGRESS NOTES
Progress Notes by Bhumika Albarran MD at 2/1/2021  8:50 AM     Author: Bhumika Albarran MD Service: -- Author Type: Physician    Filed: 2/1/2021  9:16 AM Encounter Date: 2/1/2021 Status: Signed    : Bhumika Albarran MD (Physician)           [facility]  Heart Care Clinic Follow-up Note    Assessment & Plan        1. Bicuspid aortic valve -based on echo from January 2020 dimensionless index 0.5 with a valve area 1.6 cm , mean gradient of 9 mmHg, peak gradient of 16 mmHg mean velocity 1.4 m/s with shortness of breath recheck echo now.   2. Coronary artery disease due to lipid rich plaque -angiography July 2017 showed normal left main, proximal to mid LAD 40% stenosis, normal circumflex, and right coronary artery with distal 95% stenosis followed by 50 to 60% lesion and these received a drug-coated stents.  Given vein chest discomfort, despite use of Ranexa, will perform exercise stress nuclear with him off metoprolol for 2 days.  If medium or large sized area of ischemia pursue angiography.   3. Primary hypertension -under good control on metoprolol.   4. Obesity (BMI 35.0-39.9 without comorbidity) -he has gained weight and needs to work on losing this.   5. Mixed hyperlipidemia -lipids excellent with LDL of 59 a cholesterol 123.   6.  Obstructive sleep apnea-CPAP, suggest repeat sleep study to discuss efficacy.    Plan  1.  Work on weight loss.  2.  Echo to address aortic stenosis.  3.  Exercise stress nuclear holding metoprolol for 2 days and if medium or large sized area of ischemia seen will then proceed with angiography.  4.  Speak with his sleep specialist, he should probably have a repeat sleep study.  5.  Follow-up with me in 1 year or sooner if needed.    Subjective  CC: 50-year-old white gentleman being seen in follow-up today.  He is still living at home working remotely from home which is usual for him.  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  Does admit to some increased shortness of breath and activity possibly due to increased weight.  Does tell me he is not sleeping well at night, CPAP is not fitting well during exhalation.  He has a vague chest discomfort, occurs several times throughout the day, he states it comes and goes usually at rest, is more of a discomfort during exhalation.  There is no syncope, dizziness or peripheral edema.    Medications  Current Outpatient Medications   Medication Sig   ? aspirin 81 mg chewable tablet Chew 1 tablet (81 mg total) daily.   ? atorvastatin (LIPITOR) 80 MG tablet Take 1 tablet (80 mg total) by mouth at bedtime.   ? buPROPion (WELLBUTRIN SR) 100 MG 12 hr tablet Take 100 mg by mouth 2 (two) times a day.          ? metoprolol succinate (TOPROL-XL) 25 MG Take 1 tablet (25 mg total) by mouth daily.   ? omeprazole (PRILOSEC) 20 MG capsule Take 1 capsule (20 mg total) by mouth at bedtime.   ? ranolazine (RANEXA) 1,000 mg SR tablet Take 1 tablet (1,000 mg total) by mouth 2 (two) times a day.   ? tadalafil (CIALIS ORAL) Take 6 mg by mouth as needed.       Objective  /88 (Patient Site: Right Arm, Patient Position: Sitting, Cuff Size: Adult Regular)   Pulse 73   Resp 16   Ht 5' 10\" (1.778 m)   Wt (!) 248 lb (112.5 kg)   SpO2 93%   BMI 35.58 kg/m      General Appearance:    Alert, cooperative, no distress, appears stated age   Head:    Normocephalic, without obvious abnormality, atraumatic   Throat:   Lips, mucosa, and tongue normal; teeth and gums normal   Neck:   Supple, symmetrical, trachea midline, no adenopathy;        thyroid:  No enlargement/tenderness/nodules; no carotid    bruit or JVD   Back:     Symmetric, no curvature, ROM normal, no CVA tenderness   Lungs:     Clear to auscultation bilaterally, respirations unlabored   Chest wall:    No tenderness or deformity   Heart:    Regular rate and rhythm, S1 and S2 normal, 1/6 systolic ejection murmur, no rub   or gallop   Abdomen:     Soft, " non-tender, bowel sounds active all four quadrants,     no masses, no organomegaly   Extremities:   Normal, atraumatic, no cyanosis or edema   Pulses:   2+ and symmetric all extremities   Skin:   Skin color, texture, turgor normal, no rashes or lesions     Results    Lab Results personally reviewed   Lab Results   Component Value Date    CHOL 123 12/23/2020    CHOL 133 01/24/2020     Lab Results   Component Value Date    HDL 36 (L) 12/23/2020    HDL 41 01/24/2020     Lab Results   Component Value Date    LDLCALC 59 12/23/2020    LDLCALC 74 01/24/2020     Lab Results   Component Value Date    TRIG 138 12/23/2020    TRIG 90 01/24/2020     Lab Results   Component Value Date    WBC 7.2 06/16/2019    HGB 13.9 (L) 06/16/2019    HCT 42.4 06/16/2019     06/16/2019     Lab Results   Component Value Date    CREATININE 1.04 12/23/2020    BUN 25 (H) 12/23/2020     12/23/2020    K 5.0 12/23/2020    CO2 23 12/23/2020     Review of Systems:   General: Weight Gain  Eyes: WNL  Ears/Nose/Throat: WNL  Lungs: WNL  Heart: Chest Pain  Stomach: WNL  Bladder: WNL  Muscle/Joints: WNL  Skin: WNL  Nervous System: WNL  Mental Health: WNL     Blood: WNL

## 2021-07-03 NOTE — ADDENDUM NOTE
Addendum Note by Zulma Bolanos, RN at 8/16/2019  8:30 AM     Author: Zulma Bolanos RN Service: -- Author Type: Registered Nurse    Filed: 8/16/2019  8:30 AM Encounter Date: 8/12/2019 Status: Signed    : Zulma Bolanos RN (Registered Nurse)    Addended by: ZULMA BOLANOS on: 8/16/2019 08:30 AM        Modules accepted: Orders

## 2021-07-14 PROBLEM — I20.0 UNSTABLE ANGINA (H): Status: RESOLVED | Noted: 2017-07-31 | Resolved: 2017-08-18

## 2021-08-03 PROBLEM — E66.01 MORBID OBESITY (H): Chronic | Status: RESOLVED | Noted: 2019-06-16 | Resolved: 2021-02-01

## 2021-08-03 PROBLEM — E66.01 MORBID OBESITY (H): Status: RESOLVED | Noted: 2021-02-01 | Resolved: 2021-02-01

## 2021-08-25 ENCOUNTER — OFFICE VISIT (OUTPATIENT)
Dept: CARDIOLOGY | Facility: CLINIC | Age: 51
End: 2021-08-25
Payer: COMMERCIAL

## 2021-08-25 VITALS
DIASTOLIC BLOOD PRESSURE: 72 MMHG | SYSTOLIC BLOOD PRESSURE: 110 MMHG | RESPIRATION RATE: 16 BRPM | HEART RATE: 68 BPM | BODY MASS INDEX: 32.46 KG/M2 | WEIGHT: 226.2 LBS

## 2021-08-25 DIAGNOSIS — G47.33 OSA (OBSTRUCTIVE SLEEP APNEA): ICD-10-CM

## 2021-08-25 DIAGNOSIS — Q23.81 BICUSPID AORTIC VALVE: ICD-10-CM

## 2021-08-25 DIAGNOSIS — I10 ESSENTIAL HYPERTENSION: ICD-10-CM

## 2021-08-25 DIAGNOSIS — I25.10 CORONARY ARTERY DISEASE DUE TO CALCIFIED CORONARY LESION: Primary | ICD-10-CM

## 2021-08-25 DIAGNOSIS — I25.84 CORONARY ARTERY DISEASE DUE TO CALCIFIED CORONARY LESION: Primary | ICD-10-CM

## 2021-08-25 DIAGNOSIS — E78.2 MIXED HYPERLIPIDEMIA: ICD-10-CM

## 2021-08-25 DIAGNOSIS — E66.9 OBESITY (BMI 35.0-39.9 WITHOUT COMORBIDITY): ICD-10-CM

## 2021-08-25 DIAGNOSIS — I20.89 CHRONIC STABLE ANGINA (H): ICD-10-CM

## 2021-08-25 LAB
ATRIAL RATE - MUSE: 58 BPM
DIASTOLIC BLOOD PRESSURE - MUSE: NORMAL MMHG
INTERPRETATION ECG - MUSE: NORMAL
P AXIS - MUSE: 57 DEGREES
PR INTERVAL - MUSE: 196 MS
QRS DURATION - MUSE: 92 MS
QT - MUSE: 428 MS
QTC - MUSE: 420 MS
R AXIS - MUSE: 5 DEGREES
SYSTOLIC BLOOD PRESSURE - MUSE: NORMAL MMHG
T AXIS - MUSE: 35 DEGREES
VENTRICULAR RATE- MUSE: 58 BPM

## 2021-08-25 PROCEDURE — 93000 ELECTROCARDIOGRAM COMPLETE: CPT | Performed by: INTERNAL MEDICINE

## 2021-08-25 PROCEDURE — 99214 OFFICE O/P EST MOD 30 MIN: CPT | Performed by: INTERNAL MEDICINE

## 2021-08-25 NOTE — LETTER
8/25/2021    Nita Ratliff MD  [facility] [address]  [place] [address]    RE: Taurus Cotto       Dear Colleague,    I had the pleasure of seeing Taurus Cotto in the [facility].        [facility]  Heart Care Clinic Follow-up Note    Assessment & Plan        (I25.10,  I25.84) Coronary artery disease due to calcified coronary lesion  (primary encounter diagnosis)  Comment: Angiography March 2021 showed normal left main, mid LAD 60% stenosis which was intervened upon with drug-coated stent, first diagonal 25% stenosis, proximal circumflex 20% with a mid 30% lesion and second obtuse marginal artery 30% lesion in right coronary artery with a 50% proximal and 50% mid lesion with a patent distal stent.  Vague chest discomfort with unremarkable ECG.  Perform nuclear stress test and if unremarkable will try to rest any anxieties.    (I20.8) Chronic stable angina (H)  Comment: Chest discomfort extremely atypical, has had chest discomfort for years, may or may not be better with ranolazine.  Stress test as above and if significantly abnormal proceed with angiography, if not then comforting news and not to be quite as worried.  Might consider trying to taper off Ranexa.    (E66.9) Obesity (BMI 35.0-39.9 without comorbidity)  Comment: Work on weight loss.    (E78.2) Mixed hyperlipidemia  Comment: Cholesterol 99 with an LDL of 52 from March 2021 and Zetia and atorvastatin which are acceptable.    (G47.33) IDA (obstructive sleep apnea)  Comment: No CPAP, if stress test looks good would strongly recommend CPAP.      (Q23.1) Bicuspid aortic valve  Comment: based on echo from January 2020 dimensionless index 0.5 with a valve area 1.6 cm , mean gradient of 9 mmHg, peak gradient of 16 mmHg mean velocity 1.4 m/s.  Most recent echo however read as tricuspid with aortic insufficiency, dimensionless index 0.6 with mean gradient of 8 and peak gradient  of 14 with mean velocity 1.4 m/s.  May need cardiac MRI in the future.  This was in February 2021 and cardiac MRI February 2022.    (I10) Essential hypertension  Comment: Under good control currently on lower dose of metoprolol.    Plan  1.  Exercise stress nuclear.  If medium or large sized area ischemia proceed with angiography.  2.  Cardiac MRI in February.  3.  Follow-up with me in March.    Subjective  CC: 51-year-old white gentleman being seen in follow-up today.  He tells me over the last 4 to 5 days he has had an increase in chest discomfort.  He describes it as a stabbing surface type sensation lasting less than a minute, coming on at rest followed by other episodes without any associated shortness of breath, diaphoresis, radiation, or nausea.  He thinks he is getting is due to not exercising.    Medications  Current Outpatient Medications   Medication Sig Dispense Refill     aspirin 81 mg chewable tablet [ASPIRIN 81 MG CHEWABLE TABLET] Chew 1 tablet (81 mg total) every other day.  0     atorvastatin (LIPITOR) 80 MG tablet [ATORVASTATIN (LIPITOR) 80 MG TABLET] Take 1 tablet (80 mg total) by mouth at bedtime. 90 tablet 3     buPROPion (WELLBUTRIN SR) 100 MG 12 hr tablet [BUPROPION (WELLBUTRIN SR) 100 MG 12 HR TABLET] Take 100 mg by mouth 2 (two) times a day.              ezetimibe (ZETIA) 10 mg tablet [EZETIMIBE (ZETIA) 10 MG TABLET] Take 1 tablet (10 mg total) by mouth daily. 90 tablet 3     melatonin 10 mg Tab [MELATONIN 10 MG TAB] Take 10 mg by mouth at bedtime.       metoprolol succinate (TOPROL-XL) 25 MG [METOPROLOL SUCCINATE (TOPROL-XL) 25 MG] Take 0.5 tablets (12.5 mg total) by mouth daily. 90 tablet 3     omeprazole (PRILOSEC) 20 MG capsule [OMEPRAZOLE (PRILOSEC) 20 MG CAPSULE] Take 1 capsule (20 mg total) by mouth at bedtime. 30 capsule 0     polyvinyl alcohol/povidone (CLEAR EYES NATURAL TEARS OPHT) [POLYVINYL ALCOHOL/POVIDONE (CLEAR EYES NATURAL TEARS OPHT)] Apply 2 drops to eye 3 (three) times a  day as needed.       ranolazine (RANEXA) 1,000 mg SR tablet [RANOLAZINE (RANEXA) 1,000 MG SR TABLET] Take 1 tablet (1,000 mg total) by mouth 2 (two) times a day. 60 tablet 0     sodium chloride (OCEAN) 0.65 % nasal spray [SODIUM CHLORIDE (OCEAN) 0.65 % NASAL SPRAY] Apply 2 sprays into each nostril as needed for congestion.       tadalafiL (CIALIS) 20 MG tablet [TADALAFIL (CIALIS) 20 MG TABLET] Take 20 mg by mouth daily as needed.       ticagrelor (BRILINTA) 90 mg Tab [TICAGRELOR (BRILINTA) 90 MG TAB] Take 1 tablet (90 mg total) by mouth 2 (two) times a day. Dose to start tomorrow morning 180 tablet 3       Objective  /72 (BP Location: Left arm, Patient Position: Sitting, Cuff Size: Adult Regular)   Pulse 68   Resp 16   Wt 102.6 kg (226 lb 3.2 oz)   BMI 32.46 kg/m      General Appearance:    Alert, cooperative, no distress, appears stated age   Head:    Normocephalic, without obvious abnormality, atraumatic   Throat:   Lips, mucosa, and tongue normal; teeth and gums normal   Neck:   Supple, symmetrical, trachea midline, no adenopathy;        thyroid:  No enlargement/tenderness/nodules; no carotid    bruit or JVD   Back:     Symmetric, no curvature, ROM normal, no CVA tenderness   Lungs:     Clear to auscultation bilaterally, respirations unlabored   Chest wall:    No tenderness or deformity   Heart:    Regular rate and rhythm, S1 and S2 normal, no murmur, rub   or gallop   Abdomen:     Soft, non-tender, bowel sounds active all four quadrants,     no masses, no organomegaly   Extremities:   Normal, atraumatic, no cyanosis or edema   Pulses:   2+ and symmetric all extremities   Skin:   Skin color, texture, turgor normal, no rashes or lesions     Results    Lab Results personally reviewed   Lab Results   Component Value Date    CHOL 99 03/29/2021    CHOL 135 03/02/2021     Lab Results   Component Value Date    HDL 30 (L) 03/29/2021    HDL 36 (L) 03/02/2021     No components found for: LDLCALC  Lab Results    Component Value Date    TRIG 85 03/29/2021    TRIG 102 03/02/2021     Lab Results   Component Value Date    WBC 8.1 03/03/2021    HGB 12.8 (L) 03/03/2021    HCT 38.4 (L) 03/03/2021     03/03/2021     Lab Results   Component Value Date    BUN 21 03/03/2021     03/03/2021    CO2 23 03/03/2021     Reviewed electrocardiogram normal sinus rhythm, within normal limits.    Review of Systems:   Enc Vitals  BP: 110/72  Pulse: 68  Resp: 16  Weight: 102.6 kg (226 lb 3.2 oz)                                              Thank you for allowing me to participate in the care of your patient.      Sincerely,     WILLAM MARTÍNEZ MD     [facility]  cc:   No referring provider defined for this encounter.

## 2021-08-25 NOTE — PROGRESS NOTES
[facility]  Heart Care Clinic Follow-up Note    Assessment & Plan        (I25.10,  I25.84) Coronary artery disease due to calcified coronary lesion  (primary encounter diagnosis)  Comment: Angiography March 2021 showed normal left main, mid LAD 60% stenosis which was intervened upon with drug-coated stent, first diagonal 25% stenosis, proximal circumflex 20% with a mid 30% lesion and second obtuse marginal artery 30% lesion in right coronary artery with a 50% proximal and 50% mid lesion with a patent distal stent.  Vague chest discomfort with unremarkable ECG.  Perform nuclear stress test and if unremarkable will try to rest any anxieties.    (I20.8) Chronic stable angina (H)  Comment: Chest discomfort extremely atypical, has had chest discomfort for years, may or may not be better with ranolazine.  Stress test as above and if significantly abnormal proceed with angiography, if not then comforting news and not to be quite as worried.  Might consider trying to taper off Ranexa.    (E66.9) Obesity (BMI 35.0-39.9 without comorbidity)  Comment: Work on weight loss.    (E78.2) Mixed hyperlipidemia  Comment: Cholesterol 99 with an LDL of 52 from March 2021 and Zetia and atorvastatin which are acceptable.    (G47.33) IDA (obstructive sleep apnea)  Comment: No CPAP, if stress test looks good would strongly recommend CPAP.      (Q23.1) Bicuspid aortic valve  Comment: based on echo from January 2020 dimensionless index 0.5 with a valve area 1.6 cm , mean gradient of 9 mmHg, peak gradient of 16 mmHg mean velocity 1.4 m/s.  Most recent echo however read as tricuspid with aortic insufficiency, dimensionless index 0.6 with mean gradient of 8 and peak gradient of 14 with mean velocity 1.4 m/s.  May need cardiac MRI in the future.  This was in February 2021 and cardiac MRI February 2022.    (I10) Essential hypertension  Comment: Under good control currently on lower dose of metoprolol.    Plan  1.  Exercise stress  nuclear.  If medium or large sized area ischemia proceed with angiography.  2.  Cardiac MRI in February.  3.  Follow-up with me in March.    Subjective  CC: 51-year-old white gentleman being seen in follow-up today.  He tells me over the last 4 to 5 days he has had an increase in chest discomfort.  He describes it as a stabbing surface type sensation lasting less than a minute, coming on at rest followed by other episodes without any associated shortness of breath, diaphoresis, radiation, or nausea.  He thinks he is getting is due to not exercising.    Medications  Current Outpatient Medications   Medication Sig Dispense Refill     aspirin 81 mg chewable tablet [ASPIRIN 81 MG CHEWABLE TABLET] Chew 1 tablet (81 mg total) every other day.  0     atorvastatin (LIPITOR) 80 MG tablet [ATORVASTATIN (LIPITOR) 80 MG TABLET] Take 1 tablet (80 mg total) by mouth at bedtime. 90 tablet 3     buPROPion (WELLBUTRIN SR) 100 MG 12 hr tablet [BUPROPION (WELLBUTRIN SR) 100 MG 12 HR TABLET] Take 100 mg by mouth 2 (two) times a day.              ezetimibe (ZETIA) 10 mg tablet [EZETIMIBE (ZETIA) 10 MG TABLET] Take 1 tablet (10 mg total) by mouth daily. 90 tablet 3     melatonin 10 mg Tab [MELATONIN 10 MG TAB] Take 10 mg by mouth at bedtime.       metoprolol succinate (TOPROL-XL) 25 MG [METOPROLOL SUCCINATE (TOPROL-XL) 25 MG] Take 0.5 tablets (12.5 mg total) by mouth daily. 90 tablet 3     omeprazole (PRILOSEC) 20 MG capsule [OMEPRAZOLE (PRILOSEC) 20 MG CAPSULE] Take 1 capsule (20 mg total) by mouth at bedtime. 30 capsule 0     polyvinyl alcohol/povidone (CLEAR EYES NATURAL TEARS OPHT) [POLYVINYL ALCOHOL/POVIDONE (CLEAR EYES NATURAL TEARS OPHT)] Apply 2 drops to eye 3 (three) times a day as needed.       ranolazine (RANEXA) 1,000 mg SR tablet [RANOLAZINE (RANEXA) 1,000 MG SR TABLET] Take 1 tablet (1,000 mg total) by mouth 2 (two) times a day. 60 tablet 0     sodium chloride (OCEAN) 0.65 % nasal spray [SODIUM CHLORIDE (OCEAN) 0.65 % NASAL  SPRAY] Apply 2 sprays into each nostril as needed for congestion.       tadalafiL (CIALIS) 20 MG tablet [TADALAFIL (CIALIS) 20 MG TABLET] Take 20 mg by mouth daily as needed.       ticagrelor (BRILINTA) 90 mg Tab [TICAGRELOR (BRILINTA) 90 MG TAB] Take 1 tablet (90 mg total) by mouth 2 (two) times a day. Dose to start tomorrow morning 180 tablet 3       Objective  /72 (BP Location: Left arm, Patient Position: Sitting, Cuff Size: Adult Regular)   Pulse 68   Resp 16   Wt 102.6 kg (226 lb 3.2 oz)   BMI 32.46 kg/m      General Appearance:    Alert, cooperative, no distress, appears stated age   Head:    Normocephalic, without obvious abnormality, atraumatic   Throat:   Lips, mucosa, and tongue normal; teeth and gums normal   Neck:   Supple, symmetrical, trachea midline, no adenopathy;        thyroid:  No enlargement/tenderness/nodules; no carotid    bruit or JVD   Back:     Symmetric, no curvature, ROM normal, no CVA tenderness   Lungs:     Clear to auscultation bilaterally, respirations unlabored   Chest wall:    No tenderness or deformity   Heart:    Regular rate and rhythm, S1 and S2 normal, no murmur, rub   or gallop   Abdomen:     Soft, non-tender, bowel sounds active all four quadrants,     no masses, no organomegaly   Extremities:   Normal, atraumatic, no cyanosis or edema   Pulses:   2+ and symmetric all extremities   Skin:   Skin color, texture, turgor normal, no rashes or lesions     Results    Lab Results personally reviewed   Lab Results   Component Value Date    CHOL 99 03/29/2021    CHOL 135 03/02/2021     Lab Results   Component Value Date    HDL 30 (L) 03/29/2021    HDL 36 (L) 03/02/2021     No components found for: LDLCALC  Lab Results   Component Value Date    TRIG 85 03/29/2021    TRIG 102 03/02/2021     Lab Results   Component Value Date    WBC 8.1 03/03/2021    HGB 12.8 (L) 03/03/2021    HCT 38.4 (L) 03/03/2021     03/03/2021     Lab Results   Component Value Date    BUN 21  03/03/2021     03/03/2021    CO2 23 03/03/2021     Reviewed electrocardiogram normal sinus rhythm, within normal limits.    Review of Systems:   Enc Vitals  BP: 110/72  Pulse: 68  Resp: 16  Weight: 102.6 kg (226 lb 3.2 oz)

## 2021-08-25 NOTE — PATIENT INSTRUCTIONS
Mr Taurus Cotto,  I enjoyed visiting with you again today.  I am concerned with the chest pain.  Per our conversation we will get the stress test now given the chest pains and the MRI of the valve around February.  I will plan on seeing you March.  Masoud Albarran

## 2021-08-31 ENCOUNTER — HOSPITAL ENCOUNTER (OUTPATIENT)
Dept: NUCLEAR MEDICINE | Facility: HOSPITAL | Age: 51
End: 2021-08-31
Attending: INTERNAL MEDICINE
Payer: COMMERCIAL

## 2021-08-31 ENCOUNTER — HOSPITAL ENCOUNTER (OUTPATIENT)
Dept: CARDIOLOGY | Facility: HOSPITAL | Age: 51
End: 2021-08-31
Attending: INTERNAL MEDICINE
Payer: COMMERCIAL

## 2021-08-31 DIAGNOSIS — I25.84 CORONARY ARTERY DISEASE DUE TO CALCIFIED CORONARY LESION: ICD-10-CM

## 2021-08-31 DIAGNOSIS — I25.10 CORONARY ARTERY DISEASE DUE TO CALCIFIED CORONARY LESION: ICD-10-CM

## 2021-08-31 LAB
CV STRESS CURRENT BP HE: NORMAL
CV STRESS CURRENT HR HE: 105
CV STRESS CURRENT HR HE: 109
CV STRESS CURRENT HR HE: 109
CV STRESS CURRENT HR HE: 117
CV STRESS CURRENT HR HE: 119
CV STRESS CURRENT HR HE: 124
CV STRESS CURRENT HR HE: 132
CV STRESS CURRENT HR HE: 134
CV STRESS CURRENT HR HE: 135
CV STRESS CURRENT HR HE: 135
CV STRESS CURRENT HR HE: 136
CV STRESS CURRENT HR HE: 138
CV STRESS CURRENT HR HE: 141
CV STRESS CURRENT HR HE: 59
CV STRESS CURRENT HR HE: 61
CV STRESS CURRENT HR HE: 72
CV STRESS CURRENT HR HE: 73
CV STRESS CURRENT HR HE: 74
CV STRESS CURRENT HR HE: 76
CV STRESS CURRENT HR HE: 79
CV STRESS CURRENT HR HE: 80
CV STRESS CURRENT HR HE: 80
CV STRESS CURRENT HR HE: 82
CV STRESS CURRENT HR HE: 83
CV STRESS CURRENT HR HE: 83
CV STRESS CURRENT HR HE: 86
CV STRESS CURRENT HR HE: 90
CV STRESS CURRENT HR HE: 92
CV STRESS CURRENT HR HE: 93
CV STRESS CURRENT HR HE: 93
CV STRESS CURRENT HR HE: 97
CV STRESS DEVIATION TIME HE: NORMAL
CV STRESS ECHO PERCENT HR HE: NORMAL
CV STRESS EXERCISE STAGE HE: NORMAL
CV STRESS EXERCISE STAGE REACHED HE: NORMAL
CV STRESS FINAL RESTING BP HE: NORMAL
CV STRESS FINAL RESTING HR HE: 74
CV STRESS MAX HR HE: 141
CV STRESS MAX TREADMILL GRADE HE: 16
CV STRESS MAX TREADMILL SPEED HE: 4.2
CV STRESS PEAK DIA BP HE: NORMAL
CV STRESS PEAK SYS BP HE: NORMAL
CV STRESS PHASE HE: NORMAL
CV STRESS PROTOCOL HE: NORMAL
CV STRESS RESTING PT POSITION HE: NORMAL
CV STRESS RESTING PT POSITION HE: NORMAL
CV STRESS ST DEVIATION AMOUNT HE: NORMAL
CV STRESS ST DEVIATION ELEVATION HE: NORMAL
CV STRESS ST EVELATION AMOUNT HE: NORMAL
CV STRESS TEST TYPE HE: NORMAL
CV STRESS TOTAL STAGE TIME MIN 1 HE: NORMAL
NUC STRESS EJECTION FRACTION: 57 %
RATE PRESSURE PRODUCT: NORMAL
STRESS ANGINA INDEX: 0
STRESS ECHO BASELINE DIASTOLIC HE: 88
STRESS ECHO BASELINE HR: 61
STRESS ECHO BASELINE SYSTOLIC BP: 136
STRESS ECHO CALCULATED PERCENT HR: 84 %
STRESS ECHO LAST STRESS DIASTOLIC BP: 86
STRESS ECHO LAST STRESS HR: 141
STRESS ECHO LAST STRESS SYSTOLIC BP: 186
STRESS ECHO POST ESTIMATED WORKLOAD: 11.9
STRESS ECHO POST EXERCISE DUR MIN: 10
STRESS ECHO POST EXERCISE DUR SEC: 15
STRESS ECHO TARGET HR: 168

## 2021-08-31 PROCEDURE — 78452 HT MUSCLE IMAGE SPECT MULT: CPT

## 2021-08-31 PROCEDURE — 343N000001 HC RX 343: Performed by: INTERNAL MEDICINE

## 2021-08-31 PROCEDURE — A9500 TC99M SESTAMIBI: HCPCS | Performed by: INTERNAL MEDICINE

## 2021-08-31 PROCEDURE — 93016 CV STRESS TEST SUPVJ ONLY: CPT | Performed by: INTERNAL MEDICINE

## 2021-08-31 PROCEDURE — 78452 HT MUSCLE IMAGE SPECT MULT: CPT | Mod: 26 | Performed by: GENERAL ACUTE CARE HOSPITAL

## 2021-08-31 PROCEDURE — 93018 CV STRESS TEST I&R ONLY: CPT | Performed by: GENERAL ACUTE CARE HOSPITAL

## 2021-08-31 RX ADMIN — Medication 37.6 MILLICURIE: at 15:10

## 2021-08-31 RX ADMIN — Medication 11 MCI.: at 13:32

## 2021-10-16 ENCOUNTER — HEALTH MAINTENANCE LETTER (OUTPATIENT)
Age: 51
End: 2021-10-16

## 2021-12-03 ENCOUNTER — TELEPHONE (OUTPATIENT)
Dept: CARDIOLOGY | Facility: CLINIC | Age: 51
End: 2021-12-03
Payer: COMMERCIAL

## 2021-12-03 DIAGNOSIS — I25.10 CORONARY ARTERY DISEASE DUE TO LIPID RICH PLAQUE: ICD-10-CM

## 2021-12-03 DIAGNOSIS — I20.89 CHRONIC STABLE ANGINA (H): ICD-10-CM

## 2021-12-03 DIAGNOSIS — I25.83 CORONARY ARTERY DISEASE DUE TO LIPID RICH PLAQUE: ICD-10-CM

## 2021-12-03 NOTE — TELEPHONE ENCOUNTER
Masoud Albarran MD Caswell, Mallory J, RN  Caller: Unspecified (Today,  9:05 AM)  I would favor to keep him on dual antiplatelets until at least March 1.  With that said, we do not need Ranexa which is an antianginal.  Let us try to taper this down to 500 mg or half a tablet twice a day.  If he does okay with that after few weeks then stop it and let us know how he does.  Does not necessarily need to see me, can do a lot of this over the phone.             Called patient and updated him on message from [facility]. He will do this slow taper and see how he goes. He has direct line for call back to give an update. We discussed Brilinta. It seems not correct that he cannot use co-pay card. Rx sent to retail pharmacy. He has a co-pay card at home and will try to use this for the remaining 3 months that he needs to take this medication. Direct lien provided. -[facility]

## 2021-12-03 NOTE — TELEPHONE ENCOUNTER
----- Message from Carrie Glynn sent at 12/2/2021  3:52 PM CST -----  Regarding: [facility] PATIENT  General phone call:    Caller: PATIENT    Primary cardiologist: BALAJI    Detailed reason for call: PATIENT HAS A COUPLE OF QUESTIONS, PLEASE CALL AND ADVISE.    Best phone number: [phone]    Best time to contact: ANY    Ok to leave a detailedmessage? YES    Device? NO    Additional Info:          ==Called back patient to address his concerns. He has been on Brilinta since March 3rd- his last intervention and Ranexa for some time prior. He states that both these medications combined are quite costly. He has an appt with [facility] in March, 1 year post intervention. He states that he and Dr. Albarran had discussion in August about stopping one or both of these medications.     He has lost 20 lbs and in doing so, has had very little chest discomfort over the last couple of months. He states he does not qualify for co-pay card. Will route to [facility] for review. -[facility]      Dr. Albarran,  Pt inquiring if he can come off Brilinta and/or Ranexa. He says they are quite costly. Unfortunately not quite 1 year since last intervention- March 2021. Any recommendations or would you like him to see you earlier than March appt to discuss?  Thanks,  Ed

## 2021-12-10 RX ORDER — RANOLAZINE 1000 MG/1
1000 TABLET, EXTENDED RELEASE ORAL 2 TIMES DAILY
Qty: 120 TABLET | Refills: 1 | Status: SHIPPED | OUTPATIENT
Start: 2021-12-10 | End: 2021-12-27

## 2021-12-10 NOTE — TELEPHONE ENCOUNTER
Received a call from pt. He requests a refill for Ranexa sent to [facility] in [place]. He would like to keep at 1000mg tablets for the time being. He is working on tapering down. See below on that. Rx sent in - 60 day supply at his request. -[facility]

## 2021-12-27 ENCOUNTER — TELEPHONE (OUTPATIENT)
Dept: CARDIOLOGY | Facility: CLINIC | Age: 51
End: 2021-12-27
Payer: COMMERCIAL

## 2021-12-27 DIAGNOSIS — I20.89 CHRONIC STABLE ANGINA (H): ICD-10-CM

## 2021-12-27 RX ORDER — RANOLAZINE 1000 MG/1
1000 TABLET, EXTENDED RELEASE ORAL 2 TIMES DAILY
Qty: 180 TABLET | Refills: 3 | Status: SHIPPED | OUTPATIENT
Start: 2021-12-27 | End: 2022-03-10

## 2021-12-27 NOTE — TELEPHONE ENCOUNTER
Pt called in and reports that he just needs a refill of his Ranexa sent in to his mail-order pharmacy. He is back to 1000 mg twice a day due to recurrent chronic chest pain when trying to go back down on dosage as recommended by [facility]BF. He will follow up as scheduled in March. -[facility]

## 2022-03-04 ENCOUNTER — OFFICE VISIT (OUTPATIENT)
Dept: CARDIOLOGY | Facility: CLINIC | Age: 52
End: 2022-03-04
Payer: COMMERCIAL

## 2022-03-04 VITALS
SYSTOLIC BLOOD PRESSURE: 114 MMHG | RESPIRATION RATE: 16 BRPM | DIASTOLIC BLOOD PRESSURE: 76 MMHG | BODY MASS INDEX: 33.15 KG/M2 | HEART RATE: 68 BPM | WEIGHT: 231 LBS

## 2022-03-04 DIAGNOSIS — E78.2 MIXED HYPERLIPIDEMIA: ICD-10-CM

## 2022-03-04 DIAGNOSIS — I10 ESSENTIAL HYPERTENSION: ICD-10-CM

## 2022-03-04 DIAGNOSIS — I25.10 CORONARY ARTERY DISEASE DUE TO CALCIFIED CORONARY LESION: Primary | ICD-10-CM

## 2022-03-04 DIAGNOSIS — G47.33 OSA (OBSTRUCTIVE SLEEP APNEA): ICD-10-CM

## 2022-03-04 DIAGNOSIS — E66.9 OBESITY (BMI 35.0-39.9 WITHOUT COMORBIDITY): ICD-10-CM

## 2022-03-04 DIAGNOSIS — I25.84 CORONARY ARTERY DISEASE DUE TO CALCIFIED CORONARY LESION: Primary | ICD-10-CM

## 2022-03-04 DIAGNOSIS — Q23.81 BICUSPID AORTIC VALVE: ICD-10-CM

## 2022-03-04 PROCEDURE — 99214 OFFICE O/P EST MOD 30 MIN: CPT | Performed by: INTERNAL MEDICINE

## 2022-03-04 NOTE — PROGRESS NOTES
[facility]  Heart Care Clinic Follow-up Note    Assessment & Plan        (I25.10,  I25.84) Coronary artery disease due to calcified coronary lesion  (primary encounter diagnosis)  Comment: March 2021 normal left main, mid LAD 60% stenosis which was intervened upon with drug-coated stent, first diagonal 25% stenosis, proximal circumflex 20% with a mid 30% lesion and second obtuse marginal artery 30% lesion and right coronary artery with a 50% proximal and 50% mid lesion with a patent distal stent.    No symptoms currently and minimally abnormal stress nuclear year ago.  Let ticagrelor run out and take daily aspirin and work on risk factor.      (I10) Essential hypertension  Comment: Under good control currently on medication.    (Q23.1) Bicuspid aortic valve  Comment: Based on echo from January 2020 dimensionless index 0.5 with a valve area 1.6 cm , mean gradient of 9 mmHg, peak gradient of 16 mmHg mean velocity 1.4 m/s.  Most recent echo however read as tricuspid with aortic insufficiency, dimensionless index 0.6 with mean gradient of 8 and peak gradient of 14 with mean velocity 1.4 m/s.  May need cardiac MRI in the future.  We will recheck echo.    (E78.2) Mixed hyperlipidemia  Comment: Excellent cholesterol of 99 with an LDL of 25.  Currently on statin and Zetia.    (G47.33) IDA (obstructive sleep apnea)  Comment: Nightly CPAP which is been recalled, awaiting a new machine.    (E66.9) Obesity (BMI 35.0-39.9 without comorbidity)  Comment: Work on weight loss, is gained about 5 pounds in the last few months.    Plan  1.  Work on weight loss.  2.  Discontinue ticagrelor when it runs out.  3.  Given that he has not seen his primary we will arrange for fasting lipids, comprehensive metabolic profile, CBC and PSA and send him results.  4.  Follow-up with me in 1 year or sooner if needed.  5.  Check echo given aortic valve disease.    Subjective  CC: 49-year-old white gentleman being seen in follow-up.  He  tells me over the last few months with winter he has not really exercised.  He did take his new dog, 9 months old, out for a run in the snow and became a little short of breath or fatigued but not overly so.  In general no chest pains, palpitations, significant shortness of breath, PND, orthopnea, syncope, presyncope or peripheral edema.    Medications  Current Outpatient Medications   Medication Sig Dispense Refill     aspirin 81 mg chewable tablet [ASPIRIN 81 MG CHEWABLE TABLET] Chew 1 tablet (81 mg total) every other day. (Patient taking differently: Take 81 mg by mouth daily )  0     atorvastatin (LIPITOR) 80 MG tablet [ATORVASTATIN (LIPITOR) 80 MG TABLET] Take 1 tablet (80 mg total) by mouth at bedtime. 90 tablet 3     buPROPion (WELLBUTRIN SR) 100 MG 12 hr tablet [BUPROPION (WELLBUTRIN SR) 100 MG 12 HR TABLET] Take 100 mg by mouth 2 (two) times a day.              ezetimibe (ZETIA) 10 mg tablet [EZETIMIBE (ZETIA) 10 MG TABLET] Take 1 tablet (10 mg total) by mouth daily. 90 tablet 3     metoprolol succinate (TOPROL-XL) 25 MG [METOPROLOL SUCCINATE (TOPROL-XL) 25 MG] Take 0.5 tablets (12.5 mg total) by mouth daily. 90 tablet 3     omeprazole (PRILOSEC) 20 MG capsule [OMEPRAZOLE (PRILOSEC) 20 MG CAPSULE] Take 1 capsule (20 mg total) by mouth at bedtime. 30 capsule 0     polyvinyl alcohol/povidone (CLEAR EYES NATURAL TEARS OPHT) [POLYVINYL ALCOHOL/POVIDONE (CLEAR EYES NATURAL TEARS OPHT)] Apply 2 drops to eye 3 (three) times a day as needed.       ranolazine (RANEXA) 1000 MG TB12 12 hr tablet Take 1 tablet (1,000 mg) by mouth 2 times daily 180 tablet 3     sodium chloride (OCEAN) 0.65 % nasal spray [SODIUM CHLORIDE (OCEAN) 0.65 % NASAL SPRAY] Apply 2 sprays into each nostril as needed for congestion.       tadalafiL (CIALIS) 20 MG tablet [TADALAFIL (CIALIS) 20 MG TABLET] Take 20 mg by mouth daily as needed.       ticagrelor (BRILINTA) 90 MG tablet Take 1 tablet (90 mg) by mouth 2 times daily 180 tablet 0        Objective  /76 (BP Location: Right arm, Patient Position: Sitting, Cuff Size: Adult Large)   Pulse 68   Resp 16   Wt 104.8 kg (231 lb)   BMI 33.15 kg/m      General Appearance:    Alert, cooperative, no distress, appears stated age   Head:    Normocephalic, without obvious abnormality, atraumatic   Throat:   Lips, mucosa, and tongue normal; teeth and gums normal   Neck:   Supple, symmetrical, trachea midline, no adenopathy;        thyroid:  No enlargement/tenderness/nodules; no carotid    bruit or JVD   Back:     Symmetric, no curvature, ROM normal, no CVA tenderness   Lungs:     Clear to auscultation bilaterally, respirations unlabored   Chest wall:    No tenderness or deformity   Heart:    Regular rate and rhythm, S1 and S2 1/6 systolic ejection murmur, no rub   or gallop   Abdomen:     Soft, non-tender, bowel sounds active all four quadrants,     no masses, no organomegaly   Extremities:   Normal, atraumatic, no cyanosis or edema   Pulses:   2+ and symmetric all extremities   Skin:   Skin color, texture, turgor normal, no rashes or lesions     Results    Lab Results personally reviewed   Lab Results   Component Value Date    CHOL 99 03/29/2021    CHOL 135 03/02/2021     Lab Results   Component Value Date    HDL 30 (L) 03/29/2021    HDL 36 (L) 03/02/2021     No components found for: LDLCALC  Lab Results   Component Value Date    TRIG 85 03/29/2021    TRIG 102 03/02/2021     Lab Results   Component Value Date    WBC 8.1 03/03/2021    HGB 12.8 (L) 03/03/2021    HCT 38.4 (L) 03/03/2021     03/03/2021     Lab Results   Component Value Date    BUN 21 03/03/2021     03/03/2021    CO2 23 03/03/2021

## 2022-03-04 NOTE — PROGRESS NOTES
Mr Taurus Cotto,  I enjoyed visiting with you again today.  I am glad to hear you are doing well.  Per our conversation let us get the echo and blood work in about a week and let the BRILANTA run out.  I will plan on seeing you 1 year or sooner if issues.  Masoud Albarran

## 2022-03-04 NOTE — LETTER
3/4/2022    Nita Ratliff MD  [facility] [address] [facility] [address]    RE: Taurus Cotto       Dear Colleague,     I had the pleasure of seeing Taurus Cotto in the [facility].      [facility]  Heart Care Clinic Follow-up Note    Assessment & Plan        (I25.10,  I25.84) Coronary artery disease due to calcified coronary lesion  (primary encounter diagnosis)  Comment: March 2021 normal left main, mid LAD 60% stenosis which was intervened upon with drug-coated stent, first diagonal 25% stenosis, proximal circumflex 20% with a mid 30% lesion and second obtuse marginal artery 30% lesion and right coronary artery with a 50% proximal and 50% mid lesion with a patent distal stent.    No symptoms currently and minimally abnormal stress nuclear year ago.  Let ticagrelor run out and take daily aspirin and work on risk factor.      (I10) Essential hypertension  Comment: Under good control currently on medication.    (Q23.1) Bicuspid aortic valve  Comment: Based on echo from January 2020 dimensionless index 0.5 with a valve area 1.6 cm , mean gradient of 9 mmHg, peak gradient of 16 mmHg mean velocity 1.4 m/s.  Most recent echo however read as tricuspid with aortic insufficiency, dimensionless index 0.6 with mean gradient of 8 and peak gradient of 14 with mean velocity 1.4 m/s.  May need cardiac MRI in the future.  We will recheck echo.    (E78.2) Mixed hyperlipidemia  Comment: Excellent cholesterol of 99 with an LDL of 25.  Currently on statin and Zetia.    (G47.33) IDA (obstructive sleep apnea)  Comment: Nightly CPAP which is been recalled, awaiting a new machine.    (E66.9) Obesity (BMI 35.0-39.9 without comorbidity)  Comment: Work on weight loss, is gained about 5 pounds in the last few months.    Plan  1.  Work on weight loss.  2.  Discontinue ticagrelor when it runs out.  3.  Given that he has not seen his primary we will arrange for fasting lipids, comprehensive  metabolic profile, CBC and PSA and send him results.  4.  Follow-up with me in 1 year or sooner if needed.  5.  Check echo given aortic valve disease.    Subjective  CC: 49-year-old white gentleman being seen in follow-up.  He tells me over the last few months with winter he has not really exercised.  He did take his new dog, 9 months old, out for a run in the snow and became a little short of breath or fatigued but not overly so.  In general no chest pains, palpitations, significant shortness of breath, PND, orthopnea, syncope, presyncope or peripheral edema.    Medications  Current Outpatient Medications   Medication Sig Dispense Refill     aspirin 81 mg chewable tablet [ASPIRIN 81 MG CHEWABLE TABLET] Chew 1 tablet (81 mg total) every other day. (Patient taking differently: Take 81 mg by mouth daily )  0     atorvastatin (LIPITOR) 80 MG tablet [ATORVASTATIN (LIPITOR) 80 MG TABLET] Take 1 tablet (80 mg total) by mouth at bedtime. 90 tablet 3     buPROPion (WELLBUTRIN SR) 100 MG 12 hr tablet [BUPROPION (WELLBUTRIN SR) 100 MG 12 HR TABLET] Take 100 mg by mouth 2 (two) times a day.              ezetimibe (ZETIA) 10 mg tablet [EZETIMIBE (ZETIA) 10 MG TABLET] Take 1 tablet (10 mg total) by mouth daily. 90 tablet 3     metoprolol succinate (TOPROL-XL) 25 MG [METOPROLOL SUCCINATE (TOPROL-XL) 25 MG] Take 0.5 tablets (12.5 mg total) by mouth daily. 90 tablet 3     omeprazole (PRILOSEC) 20 MG capsule [OMEPRAZOLE (PRILOSEC) 20 MG CAPSULE] Take 1 capsule (20 mg total) by mouth at bedtime. 30 capsule 0     polyvinyl alcohol/povidone (CLEAR EYES NATURAL TEARS OPHT) [POLYVINYL ALCOHOL/POVIDONE (CLEAR EYES NATURAL TEARS OPHT)] Apply 2 drops to eye 3 (three) times a day as needed.       ranolazine (RANEXA) 1000 MG TB12 12 hr tablet Take 1 tablet (1,000 mg) by mouth 2 times daily 180 tablet 3     sodium chloride (OCEAN) 0.65 % nasal spray [SODIUM CHLORIDE (OCEAN) 0.65 % NASAL SPRAY] Apply 2 sprays into each nostril as needed for  congestion.       tadalafiL (CIALIS) 20 MG tablet [TADALAFIL (CIALIS) 20 MG TABLET] Take 20 mg by mouth daily as needed.       ticagrelor (BRILINTA) 90 MG tablet Take 1 tablet (90 mg) by mouth 2 times daily 180 tablet 0       Objective  /76 (BP Location: Right arm, Patient Position: Sitting, Cuff Size: Adult Large)   Pulse 68   Resp 16   Wt 104.8 kg (231 lb)   BMI 33.15 kg/m      General Appearance:    Alert, cooperative, no distress, appears stated age   Head:    Normocephalic, without obvious abnormality, atraumatic   Throat:   Lips, mucosa, and tongue normal; teeth and gums normal   Neck:   Supple, symmetrical, trachea midline, no adenopathy;        thyroid:  No enlargement/tenderness/nodules; no carotid    bruit or JVD   Back:     Symmetric, no curvature, ROM normal, no CVA tenderness   Lungs:     Clear to auscultation bilaterally, respirations unlabored   Chest wall:    No tenderness or deformity   Heart:    Regular rate and rhythm, S1 and S2 1/6 systolic ejection murmur, no rub   or gallop   Abdomen:     Soft, non-tender, bowel sounds active all four quadrants,     no masses, no organomegaly   Extremities:   Normal, atraumatic, no cyanosis or edema   Pulses:   2+ and symmetric all extremities   Skin:   Skin color, texture, turgor normal, no rashes or lesions     Results    Lab Results personally reviewed   Lab Results   Component Value Date    CHOL 99 03/29/2021    CHOL 135 03/02/2021     Lab Results   Component Value Date    HDL 30 (L) 03/29/2021    HDL 36 (L) 03/02/2021     No components found for: LDLCALC  Lab Results   Component Value Date    TRIG 85 03/29/2021    TRIG 102 03/02/2021     Lab Results   Component Value Date    WBC 8.1 03/03/2021    HGB 12.8 (L) 03/03/2021    HCT 38.4 (L) 03/03/2021     03/03/2021     Lab Results   Component Value Date    BUN 21 03/03/2021     03/03/2021    CO2 23 03/03/2021             Mr Taurus Cotto,  I enjoyed visiting with you again today.  I am  glad to hear you are doing well.  Per our conversation let us get the echo and blood work in about a week and let the BRILANTA run out.  I will plan on seeing you 1 year or sooner if issues.  Masoud Albarran      Thank you for allowing me to participate in the care of your patient.      Sincerely,     MASOUD ALBARRAN MD     [facility]  cc:   No referring provider defined for this encounter.

## 2022-03-10 ENCOUNTER — TELEPHONE (OUTPATIENT)
Dept: CARDIOLOGY | Facility: CLINIC | Age: 52
End: 2022-03-10
Payer: COMMERCIAL

## 2022-03-10 DIAGNOSIS — I20.89 CHRONIC STABLE ANGINA (H): ICD-10-CM

## 2022-03-10 RX ORDER — RANOLAZINE 1000 MG/1
1000 TABLET, EXTENDED RELEASE ORAL 2 TIMES DAILY
Qty: 180 TABLET | Refills: 3 | Status: SHIPPED | OUTPATIENT
Start: 2022-03-10 | End: 2023-03-28

## 2022-03-10 NOTE — TELEPHONE ENCOUNTER
[facility] Call Center    Phone Message    May a detailed message be left on voicemail: yes     Reason for Call: Medication Refill Request    Has the patient contacted the pharmacy for the refill? Yes   Name of medication being requested: ranolazine (RANEXA) 1000 MG TB12 12 hr tablet     Provider who prescribed the medication: Deion  Pharmacy:      [facility] [place] - [address] [place] [address]    Date medication is needed: 3/10         Action Taken: Other: routed to [facility]     Travel Screening: Not Applicable                                            [place] [place]

## 2022-03-11 ENCOUNTER — LAB (OUTPATIENT)
Dept: CARDIOLOGY | Facility: CLINIC | Age: 52
End: 2022-03-11
Payer: COMMERCIAL

## 2022-03-11 DIAGNOSIS — E78.2 MIXED HYPERLIPIDEMIA: ICD-10-CM

## 2022-03-11 DIAGNOSIS — I25.10 CORONARY ARTERY DISEASE DUE TO CALCIFIED CORONARY LESION: ICD-10-CM

## 2022-03-11 DIAGNOSIS — I25.84 CORONARY ARTERY DISEASE DUE TO CALCIFIED CORONARY LESION: ICD-10-CM

## 2022-03-11 DIAGNOSIS — I10 ESSENTIAL HYPERTENSION: ICD-10-CM

## 2022-03-11 LAB
ALBUMIN SERPL-MCNC: 4 G/DL (ref 3.5–5)
ALP SERPL-CCNC: 88 U/L (ref 45–120)
ALT SERPL W P-5'-P-CCNC: 28 U/L (ref 0–45)
ANION GAP SERPL CALCULATED.3IONS-SCNC: 8 MMOL/L (ref 5–18)
AST SERPL W P-5'-P-CCNC: 18 U/L (ref 0–40)
BILIRUB SERPL-MCNC: 1.4 MG/DL (ref 0–1)
BUN SERPL-MCNC: 25 MG/DL (ref 8–22)
CALCIUM SERPL-MCNC: 9.1 MG/DL (ref 8.5–10.5)
CHLORIDE BLD-SCNC: 109 MMOL/L (ref 98–107)
CHOLEST SERPL-MCNC: 110 MG/DL
CO2 SERPL-SCNC: 25 MMOL/L (ref 22–31)
CREAT SERPL-MCNC: 1.12 MG/DL (ref 0.7–1.3)
FASTING STATUS PATIENT QL REPORTED: YES
GFR SERPL CREATININE-BSD FRML MDRD: 79 ML/MIN/1.73M2
GLUCOSE BLD-MCNC: 103 MG/DL (ref 70–125)
HDLC SERPL-MCNC: 36 MG/DL
LDLC SERPL CALC-MCNC: 57 MG/DL
POTASSIUM BLD-SCNC: 4.7 MMOL/L (ref 3.5–5)
PROT SERPL-MCNC: 6.9 G/DL (ref 6–8)
SODIUM SERPL-SCNC: 142 MMOL/L (ref 136–145)
TRIGL SERPL-MCNC: 86 MG/DL

## 2022-03-11 PROCEDURE — 36415 COLL VENOUS BLD VENIPUNCTURE: CPT

## 2022-03-11 PROCEDURE — 80061 LIPID PANEL: CPT

## 2022-03-11 PROCEDURE — 80053 COMPREHEN METABOLIC PANEL: CPT

## 2022-05-18 ENCOUNTER — HOSPITAL ENCOUNTER (OUTPATIENT)
Dept: CARDIOLOGY | Facility: CLINIC | Age: 52
Discharge: HOME | End: 2022-05-18
Attending: INTERNAL MEDICINE | Admitting: INTERNAL MEDICINE
Payer: COMMERCIAL

## 2022-05-18 DIAGNOSIS — Q23.81 BICUSPID AORTIC VALVE: ICD-10-CM

## 2022-05-18 LAB — LVEF ECHO: NORMAL

## 2022-05-18 PROCEDURE — 93306 TTE W/DOPPLER COMPLETE: CPT | Mod: 26 | Performed by: INTERNAL MEDICINE

## 2022-05-18 PROCEDURE — 93306 TTE W/DOPPLER COMPLETE: CPT

## 2022-06-10 ENCOUNTER — MEDICAL CORRESPONDENCE (OUTPATIENT)
Dept: HEALTH INFORMATION MANAGEMENT | Facility: CLINIC | Age: 52
End: 2022-06-10
Payer: COMMERCIAL

## 2022-06-10 ENCOUNTER — TRANSFERRED RECORDS (OUTPATIENT)
Dept: HEALTH INFORMATION MANAGEMENT | Facility: CLINIC | Age: 52
End: 2022-06-10
Payer: COMMERCIAL

## 2022-06-13 ENCOUNTER — TRANSCRIBE ORDERS (OUTPATIENT)
Dept: OTHER | Age: 52
End: 2022-06-13
Payer: COMMERCIAL

## 2022-06-13 DIAGNOSIS — S80.11XA HEMATOMA OF RIGHT LOWER EXTREMITY: Primary | ICD-10-CM

## 2022-06-24 ENCOUNTER — OFFICE VISIT (OUTPATIENT)
Dept: SURGERY | Facility: CLINIC | Age: 52
End: 2022-06-24
Attending: PHYSICIAN ASSISTANT
Payer: COMMERCIAL

## 2022-06-24 VITALS
HEIGHT: 70 IN | SYSTOLIC BLOOD PRESSURE: 111 MMHG | DIASTOLIC BLOOD PRESSURE: 78 MMHG | WEIGHT: 235.6 LBS | BODY MASS INDEX: 33.73 KG/M2

## 2022-06-24 DIAGNOSIS — S80.11XA HEMATOMA OF RIGHT LOWER EXTREMITY, INITIAL ENCOUNTER: Primary | ICD-10-CM

## 2022-06-24 PROCEDURE — 99203 OFFICE O/P NEW LOW 30 MIN: CPT | Performed by: SURGERY

## 2022-06-24 NOTE — PROGRESS NOTES
GENERAL SURGICAL CONSULTATION    I was requested by Nita aRtliff to consult on this pt to evaluate them for Leg hematoma    HPI:  This is a 51 year old male fell from a boat and struck his right leg.  He is here today with a hematoma senior care between the knee and ankle on the right leg.  The area of hematoma that is palpable is approximately 4 cm wide.  There is some skin discoloration for region approximately 10 x 15 cm.  There is some distal swelling that is notable but mild.  The patient denies shortness of breath he is having less pain but feels that the swelling itself remains pretty much unchanged over the last few weeks.    Allergies:Lisinopril    Past Medical History:   Diagnosis Date     Cardiac murmur      Coronary artery disease      GERD (gastroesophageal reflux disease)      High cholesterol      Hyperlipidemia      Hypertension        Past Surgical History:   Procedure Laterality Date     CARDIAC CATHETERIZATION  07/31/2017    AUREA to distal RCA     CARDIAC CATHETERIZATION N/A 7/31/2017    Procedure: Coronary Angiogram;  Surgeon: Dandre Love MD;  Location: [facility] Cath Lab;  Service:      CORONARY STENT PLACEMENT       CV CORONARY ANGIOGRAM N/A 3/2/2021    Procedure: Coronary Angiogram;  Surgeon: Marivel Loo MD;  Location: [facility] Cardiac Cath Lab;  Service: Cardiology     CV LEFT HEART CATHETERIZATION WITHOUT LEFT VENTRICULOGRAM Left 3/2/2021    Procedure: Left Heart Catheterization Without Left Ventriculogram;  Surgeon: Marivel Loo MD;  Location: [facility] Cardiac Cath Lab;  Service: Cardiology     FRACTURE SURGERY       HERNIA REPAIR       N[place] CATH PLMT L HRT & ARTS W/NJX & ANGIO IMG S&I Left 7/31/2017    Procedure: Left Heart Catheterization Without Left Ventriculogram;  Surgeon: Dandre Love MD;  Location: [facility] Cath Lab;  Service: Cardiology     RELEASE CARPAL TUNNEL Right        CURRENT MEDS:  Current Outpatient Medications   Medication Sig Dispense  "Refill     aspirin 81 mg chewable tablet [ASPIRIN 81 MG CHEWABLE TABLET] Chew 1 tablet (81 mg total) every other day. (Patient taking differently: Take 81 mg by mouth daily)  0     atorvastatin (LIPITOR) 80 MG tablet [ATORVASTATIN (LIPITOR) 80 MG TABLET] Take 1 tablet (80 mg total) by mouth at bedtime. 90 tablet 3     buPROPion (WELLBUTRIN SR) 100 MG 12 hr tablet [BUPROPION (WELLBUTRIN SR) 100 MG 12 HR TABLET] Take 100 mg by mouth 2 (two) times a day.              ezetimibe (ZETIA) 10 MG tablet TAKE 1 TABLET DAILY 90 tablet 1     metoprolol succinate (TOPROL-XL) 25 MG [METOPROLOL SUCCINATE (TOPROL-XL) 25 MG] Take 0.5 tablets (12.5 mg total) by mouth daily. 90 tablet 3     omeprazole (PRILOSEC) 20 MG capsule [OMEPRAZOLE (PRILOSEC) 20 MG CAPSULE] Take 1 capsule (20 mg total) by mouth at bedtime. 30 capsule 0     polyvinyl alcohol/povidone (CLEAR EYES NATURAL TEARS OPHT) [POLYVINYL ALCOHOL/POVIDONE (CLEAR EYES NATURAL TEARS OPHT)] Apply 2 drops to eye 3 (three) times a day as needed.       ranolazine (RANEXA) 1000 MG TB12 12 hr tablet Take 1 tablet (1,000 mg) by mouth 2 times daily 180 tablet 3     sodium chloride (OCEAN) 0.65 % nasal spray [SODIUM CHLORIDE (OCEAN) 0.65 % NASAL SPRAY] Apply 2 sprays into each nostril as needed for congestion.       tadalafiL (CIALIS) 20 MG tablet [TADALAFIL (CIALIS) 20 MG TABLET] Take 20 mg by mouth daily as needed.         No family history on file.  Family history is not pertinent to this patients Chief Complaint.     reports that he has quit smoking. His smoking use included cigarettes and cigars. He has never used smokeless tobacco. He reports previous drug use. He reports that he does not drink alcohol.    Review of Systems -   10 point Review of systems is negative except for; as mentioned above in HPI and PMHx    /78 (BP Location: Right arm, Patient Position: Sitting, Cuff Size: Adult Regular)   Ht 1.778 m (5' 10\")   Wt 106.9 kg (235 lb 9.6 oz)   BMI 33.81 kg/m  "     EXAM:  GENERAL: Well developed male  HEENT: EOMI, Anicteric Sclera, Moist Mucous Membranes,  In Mouth the pt does not have redness or bleeding gums  CARDIOVASCULAR: RRR w/out murmur   CHEST/LUNG: Clear to Auscultation  ABDOMEN:  Non tender to palpation, +BS  MUSCULOSKELETAL:    NEURO: He is ambulatory with good strength in both legs.  HEME/LYMPH: No Cervical Adenopathy or tenderness.     IMAGES:  We right lower extremity ultrasound evaluation from 5/22/2022.  There is no evidence of deep vein thrombosis.    Assessment/Plan:  51-year-old gentleman had a traumatic strike to the right leg which is left him with a hematoma.  This is not a particularly large hematoma and I have advised him to elevate the leg use compression if possible hot and cold treatments might accelerate the breakdown of the hematoma but is the best strategy is to wait this out.  I would not recommend any kind of drainage procedure for this hematoma.  We talked about things to look out for including signs of infection and signs of deep vein thrombosis and/or pulmonary embolus.  Thus far he looks to be avoiding issues with deep vein thrombosis based on the report of an ultrasound from 5/22/2022.    He can follow-up as needed      Marvin Cortez MD  [facility]  [phone]

## 2022-07-07 ENCOUNTER — LAB REQUISITION (OUTPATIENT)
Dept: LAB | Facility: CLINIC | Age: 52
End: 2022-07-07

## 2022-07-07 ENCOUNTER — TRANSFERRED RECORDS (OUTPATIENT)
Dept: HEALTH INFORMATION MANAGEMENT | Facility: CLINIC | Age: 52
End: 2022-07-07

## 2022-07-07 DIAGNOSIS — Z00.00 ENCOUNTER FOR GENERAL ADULT MEDICAL EXAMINATION WITHOUT ABNORMAL FINDINGS: ICD-10-CM

## 2022-07-07 LAB
PHQ9 SCORE: 4
PSA SERPL-MCNC: 2.06 NG/ML (ref 0–3.5)

## 2022-07-07 PROCEDURE — G0103 PSA SCREENING: HCPCS | Performed by: FAMILY MEDICINE

## 2022-07-23 ENCOUNTER — HEALTH MAINTENANCE LETTER (OUTPATIENT)
Age: 52
End: 2022-07-23

## 2022-08-05 ENCOUNTER — TELEPHONE (OUTPATIENT)
Dept: CARDIOLOGY | Facility: CLINIC | Age: 52
End: 2022-08-05

## 2022-08-05 DIAGNOSIS — R00.2 PALPITATIONS: ICD-10-CM

## 2022-08-05 DIAGNOSIS — R07.9 CHEST PAIN ON EXERTION: ICD-10-CM

## 2022-08-05 DIAGNOSIS — I25.83 CORONARY ARTERY DISEASE DUE TO LIPID RICH PLAQUE: Primary | ICD-10-CM

## 2022-08-05 DIAGNOSIS — I25.10 CORONARY ARTERY DISEASE DUE TO LIPID RICH PLAQUE: Primary | ICD-10-CM

## 2022-08-05 NOTE — TELEPHONE ENCOUNTER
[facility] Call Center    Phone Message    May a detailed message be left on voicemail: no     Reason for Call: Other: Taurus called to advise he has been experiencing chest pain and pressure/discomfort since Monday 8/1/2022. Please reach out to him at [phone].     Action Taken: Other: [facility]    Travel Screening: Not Applicable

## 2022-08-05 NOTE — TELEPHONE ENCOUNTER
Called back Alec to address his concerns. Last stress test was in August of 2021. He has chronic angina and is on Ranexa. He will occassionally get waves of pressure or discomfort but they subside.     He has noticed since Monday that these waves have been occurring more frequently and randomly. It is like a dull pressure in the center of his chest, maybe a little to the left side. Sometimes he feels like his heart is beating funny when it happens but his pulse will feel regular when he checks it manually.     He denies radiation of pressure, nausea, diaphoresis or shortness of breath. He says it can happen more so when he is at rest or just doing usual activities around the house. He says that he is able to mow the lawn and go on walks and actually the discomfort and pressure lessens after exercise. He does have some thoracic spine issues right now that he is dealing with. He is unsure if this is related. He does not have any SL nitroglycerin because of his PRN Cialis rx. He will talk with [facility] about this because he really does not take the Cialis all that often. He was advised on when to seek out medical attention and verbalized understanding.    Will route to [facility] for review and recommendations.       Dr. Albarran,  See above report from Alec regarding symptoms of more frequent chest pressure/discomfort. It feels like his chronic pressure but he is noticing it more frequently since Monday. He is able to do all his usual activities yet and only associated symptom is that he feels like his heart is beating a little funny but then his manual pulse will seem regular. Interestingly the pressure lessens after exercise. Recommendations? Last stress was in August 2021.  Thanks,  Ed

## 2022-08-08 NOTE — TELEPHONE ENCOUNTER
Masoud Albarran MD Caswell, Mallory J, RN  Caller: Unspecified (3 days ago,  8:02 AM)  Given that this 49-year-old has a bicuspid aortic valve I would suggest we recheck echocardiogram, I would like exercise nuclear stress test, also 7-day event monitor, and then follow-up with me thereafter.  I would not want to start nitro with the Cialis still at home even though he does not use it.   LF           Noted. Orders placed. Pt had an echo on 5/18/22 - will clarify we still want another echo and then metoprolol hold 24 hours prior and day of stress. [place] [place]      Alec An had an echo May 18th of this year. Do you still want repeat echo? Also, would you like to hold beta blocker for exercise nuc stress?  Thanks,  Mal

## 2022-08-08 NOTE — TELEPHONE ENCOUNTER
Masoud Albarran MD Caswell, Mallory J, RN  Caller: Unspecified (3 days ago,  8:02 AM)  Yes, agree no need for echo.   For the stress test I would hold beta blocker day before and morning of if he is willing to try a treadmill.   LF                 Called patient and updated on message from [facility]. He verbalized understanding and states that he had a pretty good weekend. He says that he is leaving for [place] on a fishing trip in a week. He will not be near much for medical care and will be exerting himself somewhat with fishing.    We discussed minimally trying to get his stress test done and he verbalized understanding of this and the instructions.Transferred to schedulers to have this arranged. [place] [place] covid [place] test done

## 2022-08-09 ENCOUNTER — HOSPITAL ENCOUNTER (OUTPATIENT)
Dept: NUCLEAR MEDICINE | Facility: HOSPITAL | Age: 52
Discharge: HOME | End: 2022-08-09
Attending: INTERNAL MEDICINE
Payer: COMMERCIAL

## 2022-08-09 ENCOUNTER — HOSPITAL ENCOUNTER (OUTPATIENT)
Dept: CARDIOLOGY | Facility: HOSPITAL | Age: 52
Discharge: HOME | End: 2022-08-09
Attending: INTERNAL MEDICINE
Payer: COMMERCIAL

## 2022-08-09 DIAGNOSIS — I25.10 CORONARY ARTERY DISEASE DUE TO LIPID RICH PLAQUE: ICD-10-CM

## 2022-08-09 DIAGNOSIS — R00.2 PALPITATIONS: ICD-10-CM

## 2022-08-09 DIAGNOSIS — I25.83 CORONARY ARTERY DISEASE DUE TO LIPID RICH PLAQUE: ICD-10-CM

## 2022-08-09 DIAGNOSIS — R07.9 CHEST PAIN ON EXERTION: ICD-10-CM

## 2022-08-09 LAB
CV STRESS CURRENT BP HE: NORMAL
CV STRESS CURRENT HR HE: 101
CV STRESS CURRENT HR HE: 103
CV STRESS CURRENT HR HE: 104
CV STRESS CURRENT HR HE: 105
CV STRESS CURRENT HR HE: 113
CV STRESS CURRENT HR HE: 116
CV STRESS CURRENT HR HE: 117
CV STRESS CURRENT HR HE: 125
CV STRESS CURRENT HR HE: 125
CV STRESS CURRENT HR HE: 130
CV STRESS CURRENT HR HE: 131
CV STRESS CURRENT HR HE: 136
CV STRESS CURRENT HR HE: 139
CV STRESS CURRENT HR HE: 139
CV STRESS CURRENT HR HE: 140
CV STRESS CURRENT HR HE: 62
CV STRESS CURRENT HR HE: 64
CV STRESS CURRENT HR HE: 73
CV STRESS CURRENT HR HE: 75
CV STRESS CURRENT HR HE: 76
CV STRESS CURRENT HR HE: 79
CV STRESS CURRENT HR HE: 82
CV STRESS CURRENT HR HE: 87
CV STRESS CURRENT HR HE: 87
CV STRESS CURRENT HR HE: 88
CV STRESS CURRENT HR HE: 89
CV STRESS CURRENT HR HE: 91
CV STRESS CURRENT HR HE: 95
CV STRESS CURRENT HR HE: 97
CV STRESS DEVIATION TIME HE: NORMAL
CV STRESS ECHO PERCENT HR HE: NORMAL
CV STRESS EXERCISE STAGE HE: NORMAL
CV STRESS EXERCISE STAGE REACHED HE: NORMAL
CV STRESS FINAL RESTING BP HE: NORMAL
CV STRESS FINAL RESTING HR HE: 76
CV STRESS MAX HR HE: 139
CV STRESS MAX TREADMILL GRADE HE: 16
CV STRESS MAX TREADMILL SPEED HE: 4.2
CV STRESS PEAK DIA BP HE: NORMAL
CV STRESS PEAK SYS BP HE: NORMAL
CV STRESS PHASE HE: NORMAL
CV STRESS PROTOCOL HE: NORMAL
CV STRESS RESTING PT POSITION HE: NORMAL
CV STRESS RESTING PT POSITION HE: NORMAL
CV STRESS ST DEVIATION AMOUNT HE: NORMAL
CV STRESS ST DEVIATION ELEVATION HE: NORMAL
CV STRESS ST EVELATION AMOUNT HE: NORMAL
CV STRESS TEST TYPE HE: NORMAL
CV STRESS TOTAL STAGE TIME MIN 1 HE: NORMAL
NUC STRESS EJECTION FRACTION: 57 %
RATE PRESSURE PRODUCT: NORMAL
STRESS ECHO BASELINE DIASTOLIC HE: 90
STRESS ECHO BASELINE HR: 60
STRESS ECHO BASELINE SYSTOLIC BP: 165
STRESS ECHO CALCULATED PERCENT HR: 83 %
STRESS ECHO LAST STRESS DIASTOLIC BP: 100
STRESS ECHO LAST STRESS HR: 130
STRESS ECHO LAST STRESS SYSTOLIC BP: 212
STRESS ECHO POST ESTIMATED WORKLOAD: 11.1
STRESS ECHO POST EXERCISE DUR MIN: 9
STRESS ECHO POST EXERCISE DUR SEC: 30
STRESS ECHO TARGET HR: 168

## 2022-08-09 PROCEDURE — 93270 REMOTE 30 DAY ECG REV/REPORT: CPT

## 2022-08-09 PROCEDURE — 93016 CV STRESS TEST SUPVJ ONLY: CPT | Performed by: INTERNAL MEDICINE

## 2022-08-09 PROCEDURE — 93017 CV STRESS TEST TRACING ONLY: CPT

## 2022-08-09 PROCEDURE — A9500 TC99M SESTAMIBI: HCPCS | Performed by: INTERNAL MEDICINE

## 2022-08-09 PROCEDURE — 343N000001 HC RX 343: Performed by: INTERNAL MEDICINE

## 2022-08-09 PROCEDURE — 78452 HT MUSCLE IMAGE SPECT MULT: CPT | Mod: 26 | Performed by: INTERNAL MEDICINE

## 2022-08-09 PROCEDURE — 78452 HT MUSCLE IMAGE SPECT MULT: CPT

## 2022-08-09 PROCEDURE — 93018 CV STRESS TEST I&R ONLY: CPT | Performed by: INTERNAL MEDICINE

## 2022-08-09 RX ADMIN — Medication 8.3 MCI.: at 07:32

## 2022-08-09 RX ADMIN — Medication 34.6 MILLICURIE: at 09:28

## 2022-08-29 PROCEDURE — 93272 ECG/REVIEW INTERPRET ONLY: CPT | Performed by: INTERNAL MEDICINE

## 2022-09-04 DIAGNOSIS — I25.10 CORONARY ARTERY DISEASE DUE TO LIPID RICH PLAQUE: ICD-10-CM

## 2022-09-04 DIAGNOSIS — I25.83 CORONARY ARTERY DISEASE DUE TO LIPID RICH PLAQUE: ICD-10-CM

## 2022-09-06 RX ORDER — EZETIMIBE 10 MG/1
TABLET ORAL
Qty: 90 TABLET | Refills: 3 | Status: ON HOLD | OUTPATIENT
Start: 2022-09-06 | End: 2022-10-17

## 2022-10-01 ENCOUNTER — HEALTH MAINTENANCE LETTER (OUTPATIENT)
Age: 52
End: 2022-10-01

## 2022-10-04 ENCOUNTER — OFFICE VISIT (OUTPATIENT)
Dept: CARDIOLOGY | Facility: CLINIC | Age: 52
End: 2022-10-04
Attending: INTERNAL MEDICINE
Payer: COMMERCIAL

## 2022-10-04 ENCOUNTER — PREP FOR PROCEDURE (OUTPATIENT)
Dept: CARDIOLOGY | Facility: CLINIC | Age: 52
End: 2022-10-04

## 2022-10-04 ENCOUNTER — DOCUMENTATION ONLY (OUTPATIENT)
Dept: CARDIOLOGY | Facility: CLINIC | Age: 52
End: 2022-10-04

## 2022-10-04 VITALS
BODY MASS INDEX: 34.07 KG/M2 | DIASTOLIC BLOOD PRESSURE: 78 MMHG | SYSTOLIC BLOOD PRESSURE: 110 MMHG | HEART RATE: 62 BPM | RESPIRATION RATE: 16 BRPM | WEIGHT: 238 LBS | HEIGHT: 70 IN

## 2022-10-04 DIAGNOSIS — R07.9 CHEST PAIN ON EXERTION: ICD-10-CM

## 2022-10-04 DIAGNOSIS — I25.10 CORONARY ARTERY DISEASE DUE TO LIPID RICH PLAQUE: Primary | ICD-10-CM

## 2022-10-04 DIAGNOSIS — E66.9 OBESITY (BMI 35.0-39.9 WITHOUT COMORBIDITY): ICD-10-CM

## 2022-10-04 DIAGNOSIS — I25.83 CORONARY ARTERY DISEASE DUE TO LIPID RICH PLAQUE: ICD-10-CM

## 2022-10-04 DIAGNOSIS — E78.2 MIXED HYPERLIPIDEMIA: ICD-10-CM

## 2022-10-04 DIAGNOSIS — I25.83 CORONARY ARTERY DISEASE DUE TO LIPID RICH PLAQUE: Primary | ICD-10-CM

## 2022-10-04 DIAGNOSIS — Q23.81 BICUSPID AORTIC VALVE: ICD-10-CM

## 2022-10-04 DIAGNOSIS — G47.33 OSA (OBSTRUCTIVE SLEEP APNEA): ICD-10-CM

## 2022-10-04 DIAGNOSIS — I10 ESSENTIAL HYPERTENSION: ICD-10-CM

## 2022-10-04 DIAGNOSIS — R94.39 ABNORMAL CARDIOVASCULAR STRESS TEST: Primary | ICD-10-CM

## 2022-10-04 DIAGNOSIS — I25.10 CORONARY ARTERY DISEASE DUE TO LIPID RICH PLAQUE: ICD-10-CM

## 2022-10-04 PROCEDURE — 99214 OFFICE O/P EST MOD 30 MIN: CPT | Performed by: INTERNAL MEDICINE

## 2022-10-04 RX ORDER — LIDOCAINE 40 MG/G
CREAM TOPICAL
Status: CANCELLED | OUTPATIENT
Start: 2022-10-04

## 2022-10-04 RX ORDER — DIAZEPAM 10 MG
10 TABLET ORAL
Status: CANCELLED | OUTPATIENT
Start: 2022-10-17

## 2022-10-04 RX ORDER — SODIUM CHLORIDE 9 MG/ML
INJECTION, SOLUTION INTRAVENOUS CONTINUOUS
Status: CANCELLED | OUTPATIENT
Start: 2022-10-17

## 2022-10-04 RX ORDER — FENTANYL CITRATE 50 UG/ML
25 INJECTION, SOLUTION INTRAMUSCULAR; INTRAVENOUS
Status: CANCELLED | OUTPATIENT
Start: 2022-10-17

## 2022-10-04 RX ORDER — ASPIRIN 325 MG
325 TABLET ORAL ONCE
Status: CANCELLED | OUTPATIENT
Start: 2022-10-17 | End: 2022-10-04

## 2022-10-04 RX ORDER — ASPIRIN 81 MG/1
243 TABLET, CHEWABLE ORAL ONCE
Status: CANCELLED | OUTPATIENT
Start: 2022-10-17

## 2022-10-04 NOTE — PROGRESS NOTES
[facility]  Heart Care Clinic Follow-up Note    Assessment & Plan        (I25.10,  I25.83) Coronary artery disease due to lipid rich plaque  (primary encounter diagnosis)  Comment: March 2021 normal left main, mid LAD 60% stenosis which was intervened upon with drug-coated stent, first diagonal 25% stenosis, proximal circumflex 20% with a mid 30% lesion and second obtuse marginal artery 30% lesion and right coronary artery with a 50% proximal and 50% mid lesion with a patent distal stent.  Had worsening of chest discomfort, better on higher dose metoprolol, however nuclear stress test showed small area of inferolateral scar and now medium sized area of inferolateral scar, and given his concerns about possible right coronary worsening or circumflex worsening and plan on repeat angiography with possible intervention.     (R07.9) Chest pain on exertion  Comment: As above, concerned with stress test now being worsened and arrange for angiography.    (Q23.1) Bicuspid aortic valve  Comment: Based on echo from January 2020 dimensionless index 0.5 with a valve area 1.6 cm , mean gradient of 9 mmHg, peak gradient of 16 mmHg mean velocity 1.4 m/s.  Most recent echo however read as tricuspid with aortic insufficiency +1, dimensionless index 0.6 with mean gradient of 10 and peak gradient of 16 with mean velocity 2.0 m/s.  May need cardiac MRI in the future or [facility].  We will recheck echo in a year.    (G47.33) IDA (obstructive sleep apnea)  Comment: Nightly CPAP.    (E66.9) Obesity (BMI 35.0-39.9 without comorbidity)  Comment: Work on weight loss.    (E78.2) Mixed hyperlipidemia  Comment: Excellent cholesterol 110 with LDL of 57 and triglycerides good.  Continue Zetia and atorvastatin.    (I10) Essential hypertension  Comment: Under good control.    Plan  1.  Angiography and possible intervention.  2.  Work on weight loss.  3.  Follow-up with me thereafter.  4.  Yearly echoes.    Subjective  CC: 52-year-old white  gentleman being seen in urgent follow-up.  Since I saw him he had a tightness in his chest, retrosternal, coming on with rest or during activity, would go away with rest.  His metoprolol was changed to twice a day and since then he has had no issues.  There is no significant shortness of breath, PND, orthopnea, syncope, dizziness.  No peripheral edema.    Medications  Current Outpatient Medications   Medication Sig Dispense Refill     aspirin 81 mg chewable tablet [ASPIRIN 81 MG CHEWABLE TABLET] Chew 1 tablet (81 mg total) every other day. (Patient taking differently: Take 81 mg by mouth daily)  0     atorvastatin (LIPITOR) 80 MG tablet [ATORVASTATIN (LIPITOR) 80 MG TABLET] Take 1 tablet (80 mg total) by mouth at bedtime. 90 tablet 3     buPROPion (WELLBUTRIN SR) 100 MG 12 hr tablet [BUPROPION (WELLBUTRIN SR) 100 MG 12 HR TABLET] Take 100 mg by mouth 2 (two) times a day.              ezetimibe (ZETIA) 10 MG tablet TAKE 1 TABLET DAILY 90 tablet 3     metoprolol succinate (TOPROL-XL) 25 MG [METOPROLOL SUCCINATE (TOPROL-XL) 25 MG] Take 0.5 tablets (12.5 mg total) by mouth daily. (Patient taking differently: Take 12.5 mg by mouth 2 times daily) 90 tablet 3     omeprazole (PRILOSEC) 20 MG capsule [OMEPRAZOLE (PRILOSEC) 20 MG CAPSULE] Take 1 capsule (20 mg total) by mouth at bedtime. 30 capsule 0     polyvinyl alcohol/povidone (CLEAR EYES NATURAL TEARS OPHT) [POLYVINYL ALCOHOL/POVIDONE (CLEAR EYES NATURAL TEARS OPHT)] Apply 2 drops to eye 3 (three) times a day as needed.       ranolazine (RANEXA) 1000 MG TB12 12 hr tablet Take 1 tablet (1,000 mg) by mouth 2 times daily 180 tablet 3     sodium chloride (OCEAN) 0.65 % nasal spray [SODIUM CHLORIDE (OCEAN) 0.65 % NASAL SPRAY] Apply 2 sprays into each nostril as needed for congestion.       tadalafiL (CIALIS) 20 MG tablet [TADALAFIL (CIALIS) 20 MG TABLET] Take 20 mg by mouth daily as needed.         Objective  /78 (BP Location: Left arm, Patient Position: Sitting, Cuff  "Size: Adult Large)   Pulse 62   Resp 16   Ht 1.778 m (5' 10\")   Wt 108 kg (238 lb)   BMI 34.15 kg/m      General Appearance:    Alert, cooperative, no distress, appears stated age   Head:    Normocephalic, without obvious abnormality, atraumatic   Throat:   Lips, mucosa, and tongue normal; teeth and gums normal   Neck:   Supple, symmetrical, trachea midline, no adenopathy;        thyroid:  No enlargement/tenderness/nodules; no carotid    bruit or JVD   Back:     Symmetric, no curvature, ROM normal, no CVA tenderness   Lungs:     Clear to auscultation bilaterally, respirations unlabored   Chest wall:    No tenderness or deformity   Heart:    Regular rate and rhythm, S1 and S2 normal, 1/6 systolic ejection murmur, no rub   or gallop   Abdomen:     Soft, non-tender, bowel sounds active all four quadrants,     no masses, no organomegaly   Extremities:   Normal, atraumatic, no cyanosis or edema   Pulses:   2+ and symmetric all extremities   Skin:   Skin color, texture, turgor normal, no rashes or lesions     Results    Lab Results personally reviewed   Lab Results   Component Value Date    CHOL 110 03/11/2022    CHOL 99 03/29/2021     Lab Results   Component Value Date    HDL 36 (L) 03/11/2022    HDL 30 (L) 03/29/2021     No components found for: LDLCALC  Lab Results   Component Value Date    TRIG 86 03/11/2022    TRIG 85 03/29/2021     Lab Results   Component Value Date    WBC 8.1 03/03/2021    HGB 12.8 (L) 03/03/2021    HCT 38.4 (L) 03/03/2021     03/03/2021     Lab Results   Component Value Date    BUN 25 (H) 03/11/2022     03/11/2022    CO2 25 03/11/2022         "

## 2022-10-04 NOTE — PROGRESS NOTES
[facility] ORDERING  Received: 10-4-22  0912  Jacqueline Colunga Maureen, YIMI  CORS POSS PCI WITH MY/MA     630AM ADMIT ON 10/17     H&P: 10/4 WITH L[facility]     ALERTS: SLEEP APNEA     HOME COVID TEST TO BE COMPLETED BY PT 1-2 DAYS PRIOR     THANKS!   JACQUELINE

## 2022-10-04 NOTE — PROGRESS NOTES
Taurus ALLAN [place]  [address] [facility] [address]  [phone] (home)     Procedure cardiologist:  Dr. Albarran  PCP:  Nita Ratliff  H&P completed by:  Dr. Albarran 10-4-22  Admit date Mon. 10-17-22  Arrival time:  0630  Anticoagulation:  NA  CPAP: Yes  Previous PCI: Yes  - 3-2-21 PCI, 7-31-17 PCI  Bypass Grafts: No  Renal Issues: No  Diabetic?: No  Device?: No  Type:  NA  Ambulation status: independent    Reason for Visit:  Patient seen for pre-procedure education in preparation for: Coronary Angiogram with Possible PCI    Procedure Prep:  Primary Cardiologist note dated: 10-4-22  EKG results obtained, dated: To be completed on day of cath lab procedure  Hemogram results obtained: To be completed on day of cath lab procedure  Basic Metabolic Panel results obtained: To be completed on day of cath lab procedure  Lipid Profile results obtained: 3-11-22  Pertinent cardiac test results: 8-9-22 nucGXT, 5-18-22 echo  Orders placed per cosign required protocol 10-4-22.    COVID-19 test: home rapid antigen 10-15-22 or 10-16-22 - pt instructed to take picture of negative results to present on admission and call nurse if results positive.    Patient Education  1. Your arrival time is 0630.  [place] [address] - Main Entrance of the Hospital  2. Please plan on being at the hospital all day.  3. At any time, emergencies and/or urgent cases may come up which could delay the start of your procedure.    COVID Testing Instructions  *Mandatory COVID Testing:   ALL Patients will need to complete a COVID test no sooner than 4 days prior to their procedure (regardless of vaccination status).      To schedule COVID testing Please call [phone]    If you want to complete this at an outside facility please call them to find out if they will have the results within the appropriate time frame and their fax number.  You will need to provide us with that  information so we can send the order.    The facility completing the test will need to fax the results to [phone]    If you are running into and issues please call us.     Pre-procedure instructions  Patient instructed to not Eat or Drink after midnight.  Patient instructed to shower the evening before or the morning of the procedure.  Patient instructed to arrange for transportation home following procedure from a responsible family member of friend (wife will be ).  No driving for at least 24 hours.  Patient instructed to have a responsible adult with them for 24 hours post-procedure.  Post-procedure follow up process.  Conscious sedation discussed.  Patient instructed on antiplatelet medication.  Received procedure education handouts in person.     Pre-procedure medication instructions.  Continue medications as scheduled, with a small amount of water on the day of the procedure unless indicated. (NO Diabetic Medications or Blood Thinners)  Pt instructed not to consume Alcohol, Tobacco, Caffeine 12 hours prior to procedure.  Patient instructed to take 4-81 mg of Aspirin, in addition to Wellbutrin, Metoprolol Succ, Ranexa morning of procedure.    Patient instructed to HOLD Cialis x 5 days prior to procedure.    Patient states understanding of procedure and agrees to proceed.    *PATIENTS RECORDS AVAILABLE IN [facility] UNLESS OTHERWISE INDICATED*      Patient Active Problem List   Diagnosis     SAL (dyspnea on exertion)     Sinus bradycardia     Obesity (BMI 35.0-39.9 without comorbidity)     Bicuspid aortic valve     Essential hypertension     Mixed hyperlipidemia     S/P hernia surgery     Neck pain     Chronic stable angina (H)     Coronary artery disease due to calcified coronary lesion     IDA (obstructive sleep apnea)       Current Outpatient Medications   Medication Sig Dispense Refill     aspirin 81 mg chewable tablet [ASPIRIN 81 MG CHEWABLE TABLET] Chew 1 tablet (81 mg total) every other day. (Patient  taking differently: Take 81 mg by mouth daily)  0     atorvastatin (LIPITOR) 80 MG tablet [ATORVASTATIN (LIPITOR) 80 MG TABLET] Take 1 tablet (80 mg total) by mouth at bedtime. 90 tablet 3     buPROPion (WELLBUTRIN SR) 100 MG 12 hr tablet [BUPROPION (WELLBUTRIN SR) 100 MG 12 HR TABLET] Take 100 mg by mouth 2 (two) times a day.              ezetimibe (ZETIA) 10 MG tablet TAKE 1 TABLET DAILY 90 tablet 3     metoprolol succinate (TOPROL-XL) 25 MG [METOPROLOL SUCCINATE (TOPROL-XL) 25 MG] Take 0.5 tablets (12.5 mg total) by mouth daily. (Patient taking differently: Take 12.5 mg by mouth 2 times daily) 90 tablet 3     omeprazole (PRILOSEC) 20 MG capsule [OMEPRAZOLE (PRILOSEC) 20 MG CAPSULE] Take 1 capsule (20 mg total) by mouth at bedtime. 30 capsule 0     polyvinyl alcohol/povidone (CLEAR EYES NATURAL TEARS OPHT) [POLYVINYL ALCOHOL/POVIDONE (CLEAR EYES NATURAL TEARS OPHT)] Apply 2 drops to eye 3 (three) times a day as needed.       ranolazine (RANEXA) 1000 MG TB12 12 hr tablet Take 1 tablet (1,000 mg) by mouth 2 times daily 180 tablet 3     sodium chloride (OCEAN) 0.65 % nasal spray [SODIUM CHLORIDE (OCEAN) 0.65 % NASAL SPRAY] Apply 2 sprays into each nostril as needed for congestion.       tadalafiL (CIALIS) 20 MG tablet [TADALAFIL (CIALIS) 20 MG TABLET] Take 20 mg by mouth daily as needed.         Allergies   Allergen Reactions     Lisinopril Cough       Veena Chaudhary RN on 10/4/2022 at 9:48 AM

## 2022-10-04 NOTE — PATIENT INSTRUCTIONS
Mr Taurus Cotto,  I enjoyed visiting with you again today.  I am mildly concerned with the chest discomfort, although resolved and the abnormal nuclear stress test.  Per our conversation I would like to repeat angiography to see if we need any new stents.  I will plan on seeing you thereafter.  Masoud Albarran    You can access the FollowMyHealth Patient Portal offered by [facility] by registering at the following website: [email or web address]. By joining [employer]’s FollowMyHealth portal, you will also be able to view your health information using other applications (apps) compatible with our system.

## 2022-10-04 NOTE — LETTER
10/4/2022    Nita Ratliff MD  [address]  [place] [address]    RE: Taurus Cotto       Dear Colleague,     I had the pleasure of seeing Taurus Cotto in the [facility].      [facility]  Heart Care Clinic Follow-up Note    Assessment & Plan        (I25.10,  I25.83) Coronary artery disease due to lipid rich plaque  (primary encounter diagnosis)  Comment: March 2021 normal left main, mid LAD 60% stenosis which was intervened upon with drug-coated stent, first diagonal 25% stenosis, proximal circumflex 20% with a mid 30% lesion and second obtuse marginal artery 30% lesion and right coronary artery with a 50% proximal and 50% mid lesion with a patent distal stent.  Had worsening of chest discomfort, better on higher dose metoprolol, however nuclear stress test showed small area of inferolateral scar and now medium sized area of inferolateral scar, and given his concerns about possible right coronary worsening or circumflex worsening and plan on repeat angiography with possible intervention.     (R07.9) Chest pain on exertion  Comment: As above, concerned with stress test now being worsened and arrange for angiography.    (Q23.1) Bicuspid aortic valve  Comment: Based on echo from January 2020 dimensionless index 0.5 with a valve area 1.6 cm , mean gradient of 9 mmHg, peak gradient of 16 mmHg mean velocity 1.4 m/s.  Most recent echo however read as tricuspid with aortic insufficiency +1, dimensionless index 0.6 with mean gradient of 10 and peak gradient of 16 with mean velocity 2.0 m/s.  May need cardiac MRI in the future or BRAYDON.  We will recheck echo in a year.    (G47.33) IDA (obstructive sleep apnea)  Comment: Nightly CPAP.    (E66.9) Obesity (BMI 35.0-39.9 without comorbidity)  Comment: Work on weight loss.    (E78.2) Mixed hyperlipidemia  Comment: Excellent cholesterol 110 with LDL of 57 and triglycerides good.  Continue Zetia and atorvastatin.    (I10) Essential  hypertension  Comment: Under good control.    Plan  1.  Angiography and possible intervention.  2.  Work on weight loss.  3.  Follow-up with me thereafter.  4.  Yearly echoes.    Subjective  CC: 52-year-old white gentleman being seen in urgent follow-up.  Since I saw him he had a tightness in his chest, retrosternal, coming on with rest or during activity, would go away with rest.  His metoprolol was changed to twice a day and since then he has had no issues.  There is no significant shortness of breath, PND, orthopnea, syncope, dizziness.  No peripheral edema.    Medications  Current Outpatient Medications   Medication Sig Dispense Refill     aspirin 81 mg chewable tablet [ASPIRIN 81 MG CHEWABLE TABLET] Chew 1 tablet (81 mg total) every other day. (Patient taking differently: Take 81 mg by mouth daily)  0     atorvastatin (LIPITOR) 80 MG tablet [ATORVASTATIN (LIPITOR) 80 MG TABLET] Take 1 tablet (80 mg total) by mouth at bedtime. 90 tablet 3     buPROPion (WELLBUTRIN SR) 100 MG 12 hr tablet [BUPROPION (WELLBUTRIN SR) 100 MG 12 HR TABLET] Take 100 mg by mouth 2 (two) times a day.              ezetimibe (ZETIA) 10 MG tablet TAKE 1 TABLET DAILY 90 tablet 3     metoprolol succinate (TOPROL-XL) 25 MG [METOPROLOL SUCCINATE (TOPROL-XL) 25 MG] Take 0.5 tablets (12.5 mg total) by mouth daily. (Patient taking differently: Take 12.5 mg by mouth 2 times daily) 90 tablet 3     omeprazole (PRILOSEC) 20 MG capsule [OMEPRAZOLE (PRILOSEC) 20 MG CAPSULE] Take 1 capsule (20 mg total) by mouth at bedtime. 30 capsule 0     polyvinyl alcohol/povidone (CLEAR EYES NATURAL TEARS OPHT) [POLYVINYL ALCOHOL/POVIDONE (CLEAR EYES NATURAL TEARS OPHT)] Apply 2 drops to eye 3 (three) times a day as needed.       ranolazine (RANEXA) 1000 MG TB12 12 hr tablet Take 1 tablet (1,000 mg) by mouth 2 times daily 180 tablet 3     sodium chloride (OCEAN) 0.65 % nasal spray [SODIUM CHLORIDE (OCEAN) 0.65 % NASAL SPRAY] Apply 2 sprays into each nostril as  "needed for congestion.       tadalafiL (CIALIS) 20 MG tablet [TADALAFIL (CIALIS) 20 MG TABLET] Take 20 mg by mouth daily as needed.         Objective  /78 (BP Location: Left arm, Patient Position: Sitting, Cuff Size: Adult Large)   Pulse 62   Resp 16   Ht 1.778 m (5' 10\")   Wt 108 kg (238 lb)   BMI 34.15 kg/m      General Appearance:    Alert, cooperative, no distress, appears stated age   Head:    Normocephalic, without obvious abnormality, atraumatic   Throat:   Lips, mucosa, and tongue normal; teeth and gums normal   Neck:   Supple, symmetrical, trachea midline, no adenopathy;        thyroid:  No enlargement/tenderness/nodules; no carotid    bruit or JVD   Back:     Symmetric, no curvature, ROM normal, no CVA tenderness   Lungs:     Clear to auscultation bilaterally, respirations unlabored   Chest wall:    No tenderness or deformity   Heart:    Regular rate and rhythm, S1 and S2 normal, 1/6 systolic ejection murmur, no rub   or gallop   Abdomen:     Soft, non-tender, bowel sounds active all four quadrants,     no masses, no organomegaly   Extremities:   Normal, atraumatic, no cyanosis or edema   Pulses:   2+ and symmetric all extremities   Skin:   Skin color, texture, turgor normal, no rashes or lesions     Results    Lab Results personally reviewed   Lab Results   Component Value Date    CHOL 110 03/11/2022    CHOL 99 03/29/2021     Lab Results   Component Value Date    HDL 36 (L) 03/11/2022    HDL 30 (L) 03/29/2021     No components found for: LDLCALC  Lab Results   Component Value Date    TRIG 86 03/11/2022    TRIG 85 03/29/2021     Lab Results   Component Value Date    WBC 8.1 03/03/2021    HGB 12.8 (L) 03/03/2021    HCT 38.4 (L) 03/03/2021     03/03/2021     Lab Results   Component Value Date    BUN 25 (H) 03/11/2022     03/11/2022    CO2 25 03/11/2022             Thank you for allowing me to participate in the care of your patient.      Sincerely,     WILLAM MARTÍNEZ MD     [facility] " [facility]  cc:   Masoud Albarran MD  [address], [address]  [place] [address]

## 2022-10-04 NOTE — H&P (VIEW-ONLY)
[facility]  Heart Care Clinic Follow-up Note    Assessment & Plan        (I25.10,  I25.83) Coronary artery disease due to lipid rich plaque  (primary encounter diagnosis)  Comment: March 2021 normal left main, mid LAD 60% stenosis which was intervened upon with drug-coated stent, first diagonal 25% stenosis, proximal circumflex 20% with a mid 30% lesion and second obtuse marginal artery 30% lesion and right coronary artery with a 50% proximal and 50% mid lesion with a patent distal stent.  Had worsening of chest discomfort, better on higher dose metoprolol, however nuclear stress test showed small area of inferolateral scar and now medium sized area of inferolateral scar, and given his concerns about possible right coronary worsening or circumflex worsening and plan on repeat angiography with possible intervention.     (R07.9) Chest pain on exertion  Comment: As above, concerned with stress test now being worsened and arrange for angiography.    (Q23.1) Bicuspid aortic valve  Comment: Based on echo from January 2020 dimensionless index 0.5 with a valve area 1.6 cm , mean gradient of 9 mmHg, peak gradient of 16 mmHg mean velocity 1.4 m/s.  Most recent echo however read as tricuspid with aortic insufficiency +1, dimensionless index 0.6 with mean gradient of 10 and peak gradient of 16 with mean velocity 2.0 m/s.  May need cardiac MRI in the future or BRAYDON.  We will recheck echo in a year.    (G47.33) IDA (obstructive sleep apnea)  Comment: Nightly CPAP.    (E66.9) Obesity (BMI 35.0-39.9 without comorbidity)  Comment: Work on weight loss.    (E78.2) Mixed hyperlipidemia  Comment: Excellent cholesterol 110 with LDL of 57 and triglycerides good.  Continue Zetia and atorvastatin.    (I10) Essential hypertension  Comment: Under good control.    Plan  1.  Angiography and possible intervention.  2.  Work on weight loss.  3.  Follow-up with me thereafter.  4.  Yearly echoes.    Subjective  CC: 52-year-old white  gentleman being seen in urgent follow-up.  Since I saw him he had a tightness in his chest, retrosternal, coming on with rest or during activity, would go away with rest.  His metoprolol was changed to twice a day and since then he has had no issues.  There is no significant shortness of breath, PND, orthopnea, syncope, dizziness.  No peripheral edema.    Medications  Current Outpatient Medications   Medication Sig Dispense Refill     aspirin 81 mg chewable tablet [ASPIRIN 81 MG CHEWABLE TABLET] Chew 1 tablet (81 mg total) every other day. (Patient taking differently: Take 81 mg by mouth daily)  0     atorvastatin (LIPITOR) 80 MG tablet [ATORVASTATIN (LIPITOR) 80 MG TABLET] Take 1 tablet (80 mg total) by mouth at bedtime. 90 tablet 3     buPROPion (WELLBUTRIN SR) 100 MG 12 hr tablet [BUPROPION (WELLBUTRIN SR) 100 MG 12 HR TABLET] Take 100 mg by mouth 2 (two) times a day.              ezetimibe (ZETIA) 10 MG tablet TAKE 1 TABLET DAILY 90 tablet 3     metoprolol succinate (TOPROL-XL) 25 MG [METOPROLOL SUCCINATE (TOPROL-XL) 25 MG] Take 0.5 tablets (12.5 mg total) by mouth daily. (Patient taking differently: Take 12.5 mg by mouth 2 times daily) 90 tablet 3     omeprazole (PRILOSEC) 20 MG capsule [OMEPRAZOLE (PRILOSEC) 20 MG CAPSULE] Take 1 capsule (20 mg total) by mouth at bedtime. 30 capsule 0     polyvinyl alcohol/povidone (CLEAR EYES NATURAL TEARS OPHT) [POLYVINYL ALCOHOL/POVIDONE (CLEAR EYES NATURAL TEARS OPHT)] Apply 2 drops to eye 3 (three) times a day as needed.       ranolazine (RANEXA) 1000 MG TB12 12 hr tablet Take 1 tablet (1,000 mg) by mouth 2 times daily 180 tablet 3     sodium chloride (OCEAN) 0.65 % nasal spray [SODIUM CHLORIDE (OCEAN) 0.65 % NASAL SPRAY] Apply 2 sprays into each nostril as needed for congestion.       tadalafiL (CIALIS) 20 MG tablet [TADALAFIL (CIALIS) 20 MG TABLET] Take 20 mg by mouth daily as needed.         Objective  /78 (BP Location: Left arm, Patient Position: Sitting, Cuff  "Size: Adult Large)   Pulse 62   Resp 16   Ht 1.778 m (5' 10\")   Wt 108 kg (238 lb)   BMI 34.15 kg/m      General Appearance:    Alert, cooperative, no distress, appears stated age   Head:    Normocephalic, without obvious abnormality, atraumatic   Throat:   Lips, mucosa, and tongue normal; teeth and gums normal   Neck:   Supple, symmetrical, trachea midline, no adenopathy;        thyroid:  No enlargement/tenderness/nodules; no carotid    bruit or JVD   Back:     Symmetric, no curvature, ROM normal, no CVA tenderness   Lungs:     Clear to auscultation bilaterally, respirations unlabored   Chest wall:    No tenderness or deformity   Heart:    Regular rate and rhythm, S1 and S2 normal, 1/6 systolic ejection murmur, no rub   or gallop   Abdomen:     Soft, non-tender, bowel sounds active all four quadrants,     no masses, no organomegaly   Extremities:   Normal, atraumatic, no cyanosis or edema   Pulses:   2+ and symmetric all extremities   Skin:   Skin color, texture, turgor normal, no rashes or lesions     Results    Lab Results personally reviewed   Lab Results   Component Value Date    CHOL 110 03/11/2022    CHOL 99 03/29/2021     Lab Results   Component Value Date    HDL 36 (L) 03/11/2022    HDL 30 (L) 03/29/2021     No components found for: LDLCALC  Lab Results   Component Value Date    TRIG 86 03/11/2022    TRIG 85 03/29/2021     Lab Results   Component Value Date    WBC 8.1 03/03/2021    HGB 12.8 (L) 03/03/2021    HCT 38.4 (L) 03/03/2021     03/03/2021     Lab Results   Component Value Date    BUN 25 (H) 03/11/2022     03/11/2022    CO2 25 03/11/2022         "

## 2022-10-14 ENCOUNTER — TELEPHONE (OUTPATIENT)
Dept: CARDIOLOGY | Facility: CLINIC | Age: 52
End: 2022-10-14

## 2022-10-14 NOTE — TELEPHONE ENCOUNTER
Called patient regarding potential participation in the EXPANSE trial. LVM for patient to call back.

## 2022-10-17 ENCOUNTER — HOSPITAL ENCOUNTER (OUTPATIENT)
Facility: HOSPITAL | Age: 52
Discharge: HOME | End: 2022-10-17
Attending: INTERNAL MEDICINE | Admitting: INTERNAL MEDICINE
Payer: COMMERCIAL

## 2022-10-17 VITALS
HEIGHT: 70 IN | TEMPERATURE: 97.3 F | RESPIRATION RATE: 15 BRPM | SYSTOLIC BLOOD PRESSURE: 124 MMHG | DIASTOLIC BLOOD PRESSURE: 78 MMHG | HEART RATE: 53 BPM | WEIGHT: 230 LBS | OXYGEN SATURATION: 98 % | BODY MASS INDEX: 32.93 KG/M2

## 2022-10-17 DIAGNOSIS — R07.9 CHEST PAIN ON EXERTION: ICD-10-CM

## 2022-10-17 DIAGNOSIS — I25.10 CORONARY ARTERY DISEASE DUE TO CALCIFIED CORONARY LESION: ICD-10-CM

## 2022-10-17 DIAGNOSIS — I25.10 CORONARY ARTERY DISEASE DUE TO LIPID RICH PLAQUE: ICD-10-CM

## 2022-10-17 DIAGNOSIS — I25.84 CORONARY ARTERY DISEASE DUE TO CALCIFIED CORONARY LESION: ICD-10-CM

## 2022-10-17 DIAGNOSIS — I10 ESSENTIAL HYPERTENSION: ICD-10-CM

## 2022-10-17 DIAGNOSIS — R94.39 ABNORMAL CARDIOVASCULAR STRESS TEST: ICD-10-CM

## 2022-10-17 DIAGNOSIS — I25.83 CORONARY ARTERY DISEASE DUE TO LIPID RICH PLAQUE: ICD-10-CM

## 2022-10-17 LAB
ABO/RH(D): NORMAL
ACT BLD: 288 SECONDS (ref 74–150)
ACT BLD: 301 SECONDS (ref 74–150)
ANION GAP SERPL CALCULATED.3IONS-SCNC: 12 MMOL/L (ref 7–15)
ANTIBODY SCREEN: NEGATIVE
ATRIAL RATE - MUSE: 53 BPM
ATRIAL RATE - MUSE: 55 BPM
BUN SERPL-MCNC: 25.2 MG/DL (ref 6–20)
CALCIUM SERPL-MCNC: 8.7 MG/DL (ref 8.6–10)
CHLORIDE SERPL-SCNC: 108 MMOL/L (ref 98–107)
CHOLEST SERPL-MCNC: 95 MG/DL
CREAT SERPL-MCNC: 0.99 MG/DL (ref 0.67–1.17)
DEPRECATED HCO3 PLAS-SCNC: 22 MMOL/L (ref 22–29)
DIASTOLIC BLOOD PRESSURE - MUSE: NORMAL MMHG
DIASTOLIC BLOOD PRESSURE - MUSE: NORMAL MMHG
ERYTHROCYTE [DISTWIDTH] IN BLOOD BY AUTOMATED COUNT: 12.5 % (ref 10–15)
GFR SERPL CREATININE-BSD FRML MDRD: >90 ML/MIN/1.73M2
GLUCOSE SERPL-MCNC: 106 MG/DL (ref 70–99)
HCT VFR BLD AUTO: 40.1 % (ref 40–53)
HDLC SERPL-MCNC: 37 MG/DL
HGB BLD-MCNC: 13.1 G/DL (ref 13.3–17.7)
INTERPRETATION ECG - MUSE: NORMAL
INTERPRETATION ECG - MUSE: NORMAL
LDLC SERPL CALC-MCNC: 42 MG/DL
MCH RBC QN AUTO: 29.4 PG (ref 26.5–33)
MCHC RBC AUTO-ENTMCNC: 32.7 G/DL (ref 31.5–36.5)
MCV RBC AUTO: 90 FL (ref 78–100)
NONHDLC SERPL-MCNC: 58 MG/DL
P AXIS - MUSE: 59 DEGREES
P AXIS - MUSE: 64 DEGREES
PLATELET # BLD AUTO: 212 10E3/UL (ref 150–450)
POTASSIUM SERPL-SCNC: 4.1 MMOL/L (ref 3.4–5.3)
PR INTERVAL - MUSE: 208 MS
PR INTERVAL - MUSE: 212 MS
QRS DURATION - MUSE: 86 MS
QRS DURATION - MUSE: 86 MS
QT - MUSE: 458 MS
QT - MUSE: 466 MS
QTC - MUSE: 429 MS
QTC - MUSE: 445 MS
R AXIS - MUSE: 11 DEGREES
R AXIS - MUSE: 22 DEGREES
RBC # BLD AUTO: 4.45 10E6/UL (ref 4.4–5.9)
SODIUM SERPL-SCNC: 142 MMOL/L (ref 136–145)
SPECIMEN EXPIRATION DATE: NORMAL
SYSTOLIC BLOOD PRESSURE - MUSE: NORMAL MMHG
SYSTOLIC BLOOD PRESSURE - MUSE: NORMAL MMHG
T AXIS - MUSE: 36 DEGREES
T AXIS - MUSE: 48 DEGREES
TRIGL SERPL-MCNC: 79 MG/DL
VENTRICULAR RATE- MUSE: 53 BPM
VENTRICULAR RATE- MUSE: 55 BPM
WBC # BLD AUTO: 6.2 10E3/UL (ref 4–11)

## 2022-10-17 PROCEDURE — 92928 PRQ TCAT PLMT NTRAC ST 1 LES: CPT | Mod: RC | Performed by: INTERNAL MEDICINE

## 2022-10-17 PROCEDURE — 255N000002 HC RX 255 OP 636: Performed by: INTERNAL MEDICINE

## 2022-10-17 PROCEDURE — 250N000011 HC RX IP 250 OP 636: Performed by: INTERNAL MEDICINE

## 2022-10-17 PROCEDURE — 93571 IV DOP VEL&/PRESS C FLO 1ST: CPT | Mod: RC | Performed by: INTERNAL MEDICINE

## 2022-10-17 PROCEDURE — C1725 CATH, TRANSLUMIN NON-LASER: HCPCS | Performed by: INTERNAL MEDICINE

## 2022-10-17 PROCEDURE — 250N000013 HC RX MED GY IP 250 OP 250 PS 637: Performed by: INTERNAL MEDICINE

## 2022-10-17 PROCEDURE — C1894 INTRO/SHEATH, NON-LASER: HCPCS | Performed by: INTERNAL MEDICINE

## 2022-10-17 PROCEDURE — 93010 ELECTROCARDIOGRAM REPORT: CPT | Mod: 76 | Performed by: GENERAL ACUTE CARE HOSPITAL

## 2022-10-17 PROCEDURE — 99153 MOD SED SAME PHYS/QHP EA: CPT | Performed by: INTERNAL MEDICINE

## 2022-10-17 PROCEDURE — 272N000001 HC OR GENERAL SUPPLY STERILE: Performed by: INTERNAL MEDICINE

## 2022-10-17 PROCEDURE — 86901 BLOOD TYPING SEROLOGIC RH(D): CPT | Performed by: INTERNAL MEDICINE

## 2022-10-17 PROCEDURE — C9600 PERC DRUG-EL COR STENT SING: HCPCS | Mod: RC | Performed by: INTERNAL MEDICINE

## 2022-10-17 PROCEDURE — 93571 IV DOP VEL&/PRESS C FLO 1ST: CPT | Mod: 26 | Performed by: INTERNAL MEDICINE

## 2022-10-17 PROCEDURE — 250N000009 HC RX 250: Performed by: INTERNAL MEDICINE

## 2022-10-17 PROCEDURE — C1874 STENT, COATED/COV W/DEL SYS: HCPCS | Performed by: INTERNAL MEDICINE

## 2022-10-17 PROCEDURE — 258N000003 HC RX IP 258 OP 636: Performed by: INTERNAL MEDICINE

## 2022-10-17 PROCEDURE — 93572 IV DOP VEL&/PRESS C FLO EA: CPT | Mod: LC

## 2022-10-17 PROCEDURE — 93458 L HRT ARTERY/VENTRICLE ANGIO: CPT | Performed by: INTERNAL MEDICINE

## 2022-10-17 PROCEDURE — C1887 CATHETER, GUIDING: HCPCS | Performed by: INTERNAL MEDICINE

## 2022-10-17 PROCEDURE — 86850 RBC ANTIBODY SCREEN: CPT | Performed by: INTERNAL MEDICINE

## 2022-10-17 PROCEDURE — 99152 MOD SED SAME PHYS/QHP 5/>YRS: CPT | Performed by: INTERNAL MEDICINE

## 2022-10-17 PROCEDURE — 93010 ELECTROCARDIOGRAM REPORT: CPT | Performed by: GENERAL ACUTE CARE HOSPITAL

## 2022-10-17 PROCEDURE — 93005 ELECTROCARDIOGRAM TRACING: CPT

## 2022-10-17 PROCEDURE — 93458 L HRT ARTERY/VENTRICLE ANGIO: CPT | Mod: 26 | Performed by: INTERNAL MEDICINE

## 2022-10-17 PROCEDURE — 80048 BASIC METABOLIC PNL TOTAL CA: CPT | Performed by: INTERNAL MEDICINE

## 2022-10-17 PROCEDURE — 93572 IV DOP VEL&/PRESS C FLO EA: CPT | Mod: 26 | Performed by: INTERNAL MEDICINE

## 2022-10-17 PROCEDURE — 85027 COMPLETE CBC AUTOMATED: CPT | Performed by: INTERNAL MEDICINE

## 2022-10-17 PROCEDURE — 36415 COLL VENOUS BLD VENIPUNCTURE: CPT | Performed by: INTERNAL MEDICINE

## 2022-10-17 PROCEDURE — 999N000054 HC STATISTIC EKG NON-CHARGEABLE

## 2022-10-17 PROCEDURE — 85347 COAGULATION TIME ACTIVATED: CPT

## 2022-10-17 PROCEDURE — 80061 LIPID PANEL: CPT | Performed by: INTERNAL MEDICINE

## 2022-10-17 PROCEDURE — C1769 GUIDE WIRE: HCPCS | Performed by: INTERNAL MEDICINE

## 2022-10-17 DEVICE — STENT CORONARY SYNERGY XD MR US 3.5X48MM H7493941848350: Type: IMPLANTABLE DEVICE | Site: CORONARY | Status: FUNCTIONAL

## 2022-10-17 DEVICE — STENT CORONARY DES SYNERGY XD MR US 4.00X12MM H7493941812400: Type: IMPLANTABLE DEVICE | Site: CORONARY | Status: FUNCTIONAL

## 2022-10-17 RX ORDER — HYDRALAZINE HYDROCHLORIDE 20 MG/ML
10 INJECTION INTRAMUSCULAR; INTRAVENOUS EVERY 4 HOURS PRN
Status: DISCONTINUED | OUTPATIENT
Start: 2022-10-17 | End: 2022-10-17 | Stop reason: HOSPADM

## 2022-10-17 RX ORDER — DIAZEPAM 10 MG
10 TABLET ORAL
Status: COMPLETED | OUTPATIENT
Start: 2022-10-17 | End: 2022-10-17

## 2022-10-17 RX ORDER — METOPROLOL SUCCINATE 25 MG/1
12.5 TABLET, EXTENDED RELEASE ORAL 2 TIMES DAILY
Qty: 60 TABLET | Refills: 12 | Status: SHIPPED | OUTPATIENT
Start: 2022-10-17 | End: 2023-01-02

## 2022-10-17 RX ORDER — ONDANSETRON 4 MG/1
4 TABLET, ORALLY DISINTEGRATING ORAL EVERY 6 HOURS PRN
Status: DISCONTINUED | OUTPATIENT
Start: 2022-10-17 | End: 2022-10-17 | Stop reason: HOSPADM

## 2022-10-17 RX ORDER — ASPIRIN 81 MG/1
81 TABLET, CHEWABLE ORAL ONCE
Status: DISCONTINUED | OUTPATIENT
Start: 2022-10-17 | End: 2022-10-17 | Stop reason: HOSPADM

## 2022-10-17 RX ORDER — SODIUM CHLORIDE 9 MG/ML
INJECTION, SOLUTION INTRAVENOUS CONTINUOUS
Status: DISCONTINUED | OUTPATIENT
Start: 2022-10-17 | End: 2022-10-17 | Stop reason: HOSPADM

## 2022-10-17 RX ORDER — EZETIMIBE 10 MG/1
10 TABLET ORAL EVERY EVENING
COMMUNITY
End: 2023-05-02

## 2022-10-17 RX ORDER — ACETAMINOPHEN 325 MG/1
650 TABLET ORAL EVERY 4 HOURS PRN
Status: DISCONTINUED | OUTPATIENT
Start: 2022-10-17 | End: 2022-10-17 | Stop reason: HOSPADM

## 2022-10-17 RX ORDER — ONDANSETRON 2 MG/ML
4 INJECTION INTRAMUSCULAR; INTRAVENOUS EVERY 6 HOURS PRN
Status: DISCONTINUED | OUTPATIENT
Start: 2022-10-17 | End: 2022-10-17 | Stop reason: HOSPADM

## 2022-10-17 RX ORDER — FENTANYL CITRATE 50 UG/ML
25 INJECTION, SOLUTION INTRAMUSCULAR; INTRAVENOUS
Status: DISCONTINUED | OUTPATIENT
Start: 2022-10-17 | End: 2022-10-17 | Stop reason: HOSPADM

## 2022-10-17 RX ORDER — FLUMAZENIL 0.1 MG/ML
0.2 INJECTION, SOLUTION INTRAVENOUS
Status: ACTIVE | OUTPATIENT
Start: 2022-10-17 | End: 2022-10-17

## 2022-10-17 RX ORDER — NALOXONE HYDROCHLORIDE 0.4 MG/ML
0.2 INJECTION, SOLUTION INTRAMUSCULAR; INTRAVENOUS; SUBCUTANEOUS
Status: ACTIVE | OUTPATIENT
Start: 2022-10-17 | End: 2022-10-17

## 2022-10-17 RX ORDER — METOPROLOL TARTRATE 1 MG/ML
5 INJECTION, SOLUTION INTRAVENOUS
Status: DISCONTINUED | OUTPATIENT
Start: 2022-10-17 | End: 2022-10-17 | Stop reason: HOSPADM

## 2022-10-17 RX ORDER — LIDOCAINE 40 MG/G
CREAM TOPICAL
Status: DISCONTINUED | OUTPATIENT
Start: 2022-10-17 | End: 2022-10-17 | Stop reason: HOSPADM

## 2022-10-17 RX ORDER — IODIXANOL 320 MG/ML
INJECTION, SOLUTION INTRAVASCULAR
Status: DISCONTINUED | OUTPATIENT
Start: 2022-10-17 | End: 2022-10-17 | Stop reason: HOSPADM

## 2022-10-17 RX ORDER — FENTANYL CITRATE 50 UG/ML
INJECTION, SOLUTION INTRAMUSCULAR; INTRAVENOUS
Status: DISCONTINUED | OUTPATIENT
Start: 2022-10-17 | End: 2022-10-17 | Stop reason: HOSPADM

## 2022-10-17 RX ORDER — BUPROPION HYDROCHLORIDE 100 MG/1
100 TABLET, EXTENDED RELEASE ORAL DAILY
COMMUNITY

## 2022-10-17 RX ORDER — ATORVASTATIN CALCIUM 80 MG/1
80 TABLET, FILM COATED ORAL DAILY
Qty: 90 TABLET | Refills: 3 | Status: SHIPPED | OUTPATIENT
Start: 2022-10-17

## 2022-10-17 RX ORDER — NITROGLYCERIN 0.4 MG/1
0.4 TABLET SUBLINGUAL EVERY 5 MIN PRN
Status: DISCONTINUED | OUTPATIENT
Start: 2022-10-17 | End: 2022-10-17 | Stop reason: HOSPADM

## 2022-10-17 RX ORDER — OXYCODONE HYDROCHLORIDE 5 MG/1
10 TABLET ORAL EVERY 4 HOURS PRN
Status: DISCONTINUED | OUTPATIENT
Start: 2022-10-17 | End: 2022-10-17 | Stop reason: HOSPADM

## 2022-10-17 RX ORDER — ATROPINE SULFATE 0.1 MG/ML
0.5 INJECTION INTRAVENOUS
Status: ACTIVE | OUTPATIENT
Start: 2022-10-17 | End: 2022-10-17

## 2022-10-17 RX ORDER — SODIUM CHLORIDE 9 MG/ML
INJECTION, SOLUTION INTRAVENOUS CONTINUOUS
Status: ACTIVE | OUTPATIENT
Start: 2022-10-17 | End: 2022-10-17

## 2022-10-17 RX ORDER — OXYCODONE HYDROCHLORIDE 5 MG/1
5 TABLET ORAL EVERY 4 HOURS PRN
Status: DISCONTINUED | OUTPATIENT
Start: 2022-10-17 | End: 2022-10-17 | Stop reason: HOSPADM

## 2022-10-17 RX ORDER — NALOXONE HYDROCHLORIDE 0.4 MG/ML
0.4 INJECTION, SOLUTION INTRAMUSCULAR; INTRAVENOUS; SUBCUTANEOUS
Status: ACTIVE | OUTPATIENT
Start: 2022-10-17 | End: 2022-10-17

## 2022-10-17 RX ORDER — HEPARIN SODIUM 1000 [USP'U]/ML
INJECTION, SOLUTION INTRAVENOUS; SUBCUTANEOUS
Status: DISCONTINUED | OUTPATIENT
Start: 2022-10-17 | End: 2022-10-17 | Stop reason: HOSPADM

## 2022-10-17 RX ORDER — ASPIRIN 81 MG/1
81 TABLET, CHEWABLE ORAL DAILY
Status: ON HOLD | COMMUNITY
End: 2022-10-17

## 2022-10-17 RX ORDER — ASPIRIN 325 MG
325 TABLET ORAL ONCE
Status: DISCONTINUED | OUTPATIENT
Start: 2022-10-17 | End: 2022-10-17 | Stop reason: HOSPADM

## 2022-10-17 RX ORDER — ASPIRIN 81 MG/1
81 TABLET ORAL DAILY
Status: DISCONTINUED | OUTPATIENT
Start: 2022-10-18 | End: 2022-10-17 | Stop reason: HOSPADM

## 2022-10-17 RX ORDER — ASPIRIN 81 MG/1
243 TABLET, CHEWABLE ORAL ONCE
Status: DISCONTINUED | OUTPATIENT
Start: 2022-10-17 | End: 2022-10-17 | Stop reason: HOSPADM

## 2022-10-17 RX ADMIN — DIAZEPAM 10 MG: 5 TABLET ORAL at 08:20

## 2022-10-17 RX ADMIN — SODIUM CHLORIDE: 9 INJECTION, SOLUTION INTRAVENOUS at 07:56

## 2022-10-17 RX ADMIN — TICAGRELOR 90 MG: 90 TABLET ORAL at 19:36

## 2022-10-17 ASSESSMENT — ACTIVITIES OF DAILY LIVING (ADL)
ADLS_ACUITY_SCORE: 36
ADLS_ACUITY_SCORE: 35
ADLS_ACUITY_SCORE: 36
ADLS_ACUITY_SCORE: 36
ADLS_ACUITY_SCORE: 35
ADLS_ACUITY_SCORE: 35
ADLS_ACUITY_SCORE: 36
ADLS_ACUITY_SCORE: 36

## 2022-10-17 ASSESSMENT — EJECTION FRACTION: EF_VALUE: .27

## 2022-10-17 NOTE — INTERVAL H&P NOTE
"I have reviewed the surgical (or preoperative) H&P that is linked to this encounter, and examined the patient. There are no significant changes    Clinical Conditions Present on Arrival:  Clinically Significant Risk Factors Present on Admission                   # Obesity: Estimated body mass index is 33 kg/m  as calculated from the following:    Height as of this encounter: 1.778 m (5' 10\").    Weight as of this encounter: 104.3 kg (230 lb).       "

## 2022-10-17 NOTE — DISCHARGE INSTRUCTIONS
A repeat lipid profile blood test has been ordered, and can be completed in about 2-4 weeks.   [facility] outpatient lab is open from Monday thru Friday, 8am -4:30pm.    Interventional Cardiology  Coronary Angiogram/Angioplasty/Stent/Atherectomy Discharge  Instructions - Radial (wrist) Approach   The instructions below are to help you understand how to take care of yourself. There is also information about when to call the doctor or emergency services.  **Do not stop your aspirin or platelet inhibitor unless directed by your Cardiologist.  These medications help to prevent platelets in your blood from sticking together and forming a clot.   Examples of these medications are: Ticagrelor (Brilinta), Clopidogrel (Plavix), Prasugrel (Effient)  For 24 hours after procedure:  Do not subject hand/arm to any forceful movements for 24 hours, such as supporting weight when rising from a chair or bed.  Do not drive a car for 24 hours.  The dressing on the puncture site may be removed after 24 hours and left open to air. If minor oozing, you may apply a Band-Aid and remove after 12 hours.   You may shower on the day after your procedure. Do not take a tub bath or wash dishes (no soaking wrist) with the puncture site in water for 3 days after the procedure.  For 48 hours following the procedure:  Do not operate a lawnmower, motorcycle, chainsaw or all-terrain vehicle.  Do not lift anything heavier than 5-10 pounds with affected arm for 5 days.  Avoid excessive bending (flexion/extension) wrist movement.  Do not engage in vigorous exercise (i.e. tennis, golf) using the affected arm for 5 days after discharge.  You may return to work in 72 hours if no complications and no heavy lifting.  If bleeding should occur following discharge:  Sit down and apply firm pressure with your thumb against the puncture site and fingers against back of wrist for 10 minutes.  If the bleeding stops, continue to rest, keeping your wrist still for 2  hours. Notify your doctor as soon as possible.  If bleeding does not stop after 10 minutes or if there is a large amount of bleeding or spurting, call 911 immediately. Do not drive yourself to the hospital.         Contact the Heart Clinic at [phone] if you develop:  Fever over 100.4, that lasts more than one day  Redness, heat, or pus at the puncture site  Change in color or temperature of the hand or arm  Expect mild tingling of hand and tenderness at the puncture site for up to 3 days. You may take Tylenol or a pain medicine recommended by your doctor.           Our Cardiac Rehab staff may visit briefly with you while your in the hospital.   If they miss you, someone will contact you after you are home.  You are encouraged to enroll in an Outpatient Cardiac Rehab Program   No elective dental work for 6 weeks after having a stent.  Your Procedural Physician was: Dr. Manuel Mclain  the phone number is: [phone]  [facility]:  [phone]  If you are calling after hours, please listen to the entire voicemail, a live  will answer at the end of the message

## 2022-10-17 NOTE — Clinical Note
----- Message from Paralee Rule sent at 7/24/2019 12:42 PM EDT -----  Received PA approval for itraconazole but pt price for 30 day supply is over $200 due to pt deductible. Balloon prepped per 's instructions.

## 2022-10-17 NOTE — PROGRESS NOTES
Ad[place] [facility] [place]. Pt states a good understanding of the procedure and agrees to proceed,prepped and ready.

## 2022-10-17 NOTE — Clinical Note
Stent deployed in the proximal right coronary artery. Max pressure = 11 alex. Total duration = 30 seconds.

## 2022-10-17 NOTE — Clinical Note
The first balloon was inserted into the right coronary artery and proximal right coronary artery.Max pressure = 12 alex. Total duration = 6 seconds.     Max pressure = 12 alex. Total duration = 12 seconds. What Type Of Note Output Would You Prefer (Optional)?: Standard Output How Severe Is Your Skin Lesion?: moderate Has Your Skin Lesion Been Treated?: not been treated Is This A New Presentation, Or A Follow-Up?: Skin Lesion

## 2022-10-17 NOTE — PRE-PROCEDURE
GENERAL PRE-PROCEDURE:   Procedure:  Coronary angiogram with possible PCI  Date/Time:  10/17/2022 7:42 AM    Written consent obtained?: Yes    Risks and benefits: Risks, benefits and alternatives were discussed    Consent given by:  Patient  Patient states understanding of procedure being performed: Yes    Patient's understanding of procedure matches consent: Yes    Procedure consent matches procedure scheduled: Yes    Expected level of sedation:  Moderate  Appropriately NPO:  Yes  ASA Class:  3 (CAD, bicuspid aortic valve, IDA, class I obesity; BMI 33.00 kg/m2, HTN, HLD)  Mallampati  :  Grade 2- soft palate, base of uvula, tonsillar pillars, and portion of posterior pharyngeal wall visible  Lungs:  Lungs clear with good breath sounds bilaterally  Heart:  Other (comment)  Heart exam comment:  Chai  History & Physical reviewed:  History and physical reviewed and no updates needed  Statement of review:  I have reviewed the lab findings, diagnostic data, medications, and the plan for sedation

## 2022-10-17 NOTE — Clinical Note
The first balloon was inserted into the right coronary artery and middle right coronary artery.Max pressure = 12 alex. Total duration = 10 seconds.     Max pressure = 12 alex. Total duration = 7 seconds.  Balloon reinflated a fourth time: Max pressure = 12 alex. Total duration = 5 seconds.

## 2022-10-17 NOTE — Clinical Note
The first balloon was inserted into the right coronary artery and middle right coronary artery.Max pressure = 8 alex. Total duration = 12 seconds.     Max pressure = 12 alex. Total duration = 8 seconds.    Balloon reinflated a second time: Max pressure = 12 alex. Total duration = 8 seconds. Max pressure = 12 alex. Total duration = 7 seconds.

## 2022-10-17 NOTE — Clinical Note
Stent deployed in the middle right coronary artery. Max pressure = 11 alex. Total duration = 30 seconds.

## 2022-10-17 NOTE — Clinical Note
The first balloon was inserted into the right coronary artery and middle right coronary artery.Max pressure = 12 alex. Total duration = 8 seconds.

## 2022-10-17 NOTE — Clinical Note
The first balloon was inserted into the right coronary artery and proximal right coronary artery.Max pressure = 12 alex. Total duration = 8 seconds.     Max pressure = 12 alex. Total duration = 7 seconds.    Balloon reinflated a second time: Max pressure = 12 alex. Total duration = 7 seconds. Max pressure = 12 alex. Total duration = 9 seconds.

## 2022-10-18 NOTE — PROGRESS NOTES
Patient was kept comfortable during post-procedure stay.VSS. Denies pain or any other complaints during his recovery. Right radial access site remains dry & free from signs of bleeding. Appointments made & included in AVS. Dr. Mclain was able to speak with patient's wife post-procedure.   Pt able to eat, drink, ambulate and void post-procedure.  Post-op instructions given to patient & spouse. Able to ask questions and verbalized no concerns. Belongings returned. Discharged to home with his wife in stable condition.

## 2022-10-26 ENCOUNTER — HOSPITAL ENCOUNTER (OUTPATIENT)
Dept: CARDIAC REHAB | Facility: HOSPITAL | Age: 52
Discharge: HOME | End: 2022-10-26
Attending: INTERNAL MEDICINE
Payer: COMMERCIAL

## 2022-10-26 DIAGNOSIS — I25.84 CORONARY ARTERY DISEASE DUE TO CALCIFIED CORONARY LESION: ICD-10-CM

## 2022-10-26 DIAGNOSIS — I25.10 CORONARY ARTERY DISEASE DUE TO CALCIFIED CORONARY LESION: ICD-10-CM

## 2022-10-26 PROCEDURE — 93798 PHYS/QHP OP CAR RHAB W/ECG: CPT

## 2022-10-26 NOTE — PROGRESS NOTES
Assessment/Recommendations   Assessment:    1.  Coronary artery disease with status post PCI to distal RCA in 2017 and LAD in 2021: Recent concern with accelerated angina with previous history of abnormal stress test.    Coronary angiogram on 10/17/2022 showed 70% proximal to mid vessel stenosis with positive FFR.  Lesion successfully treated with drug-eluting stents x2.  Patient was also noted to have moderate disease with 50% stenosis in mid circumflex negative FloWire assessment.    On dual antiplatelet therapy with ASA 81 mg indefinitely and Ticagrelor (Brilinta) 90 mg twice a dayfor 12 months.  After 12 months, he was recommended to stay on dual antiplatelet therapy with aspirin 81 mg and clopidogrel 75 mg indefinitely if tolerated.    Patient denies chest pain.  Reports his shortness of breath is unchanged from baseline.  He also feels mild shortness of breath sometimes at rest which he thinks is due to Brilinta.  He has done some walking since post PCI.  He does get some shortness of breath although it does not stop him from exercising.    Cardiac rehab has been initiated.    2.  Dyslipidemia with LDL goal <70/Obesity with a BMI of: Taurus Cotto is on high intensity statin therapy with Atorvastatin 80 mg daily and Zetia 10 mg daily. Most recent LDL is 42 and TGl 49 on 10/17/22    3.  Hypertension: His blood pressure is well controlled. Currently on Metoprolol Succinate 12.5 mg twice a day.    5.  Obstructive sleep apnea: On CPAP.  He is trying to use CPAP regularly as much as possible.  He has not followed up with sleep clinic for a while.  He does report some fatigue and daytime sleepiness which I think could be due to inconsistent use of CPAP.    Plan:  - We discussed the importance of antiplatelet therapy and talking with his cardiologist prior to stopping these medications for any reason.  We discussed about utilization of as needed nitroglycerin.     -Given minimal side effect from Brilinta,  will continue for now.    - Encouraged to seek medical attention if recurrent chest pain or shortness of breath.    - Risk factor modification and lifestyle management topics were discussed including managing comorbidities, weight loss, heart healthy diet, exercise and stress reduction.      - Cardiac rehab as scheduled    - We discussed a diet low in saturated fat, weight loss, and exercise along with medication for better control of cholesterol.    - Continue current hypertension regimen    Follow up with Dr. Albarran as scheduled in November     History of Present Illness/Subjective    Mr. Taurus Cotto is a 52 year old male with a past medical history of coronary disease with status post PCI to distal RCA in 2017, LAD in 2021, possible bicuspid aortic valve, hypertension, hyperlipidemia, obesity with BMI of 34.26,  Angina and recent status post PCI/AUREA to RCA on 10/17/2022 who is seen at [facility] for post coronary intervention follow up.     Today, Alec reports that his shortness of breath on exertion is unchanged from baseline.  He does not worse or better since recent stent placement.  He has noticed mild shortness of breath which is kind of new since recent stent placement and he suspects is due to Brilinta.  He denies lightheadedness, orthopnea, PND, palpitations, chest pain, abdominal fullness/bloating and lower extremity edema or bleeding complications.    Coronary Angiogram done on 10/17/22: reviewed:  Conclusion  Taurus Cotto is a 52 year old old male with CAD s/p prior PCI's to dRCA '17, LAD '21, possibly bicuspid AV, HTN, HL, high BMI who is here for w/u of accelerated angina.     - single-vessel obstructive CAD, confirmed to be FFR-positive in RCA and now s/p AUREA x2; Lcx was negative by flow wire assessment   - ASA 81mg daily indefinitely, ticagrelor 180mg once, followed by 90mg twice daily for at least 12 months. Clopidogrel 600mg once, then 75mg daily, beyond  "one year. Would continue DAPT indefinitely  - continue aggressive risk factor modification     Findings:  LM:no obstruction  LAD:patent mid-vessel stent w/ ~ 10% ISR    Lcx:mid-vessel 50% narrowing at the take off of OM1 and AV Lcx. PD/PA was 1 on both branches - no adenosine given  RCA:dominant, long area of up to 70% disease throughout prox to mid-vessel. PD/PA 0.98, FFR 0.79     LVEDP:17   Access:  R Radial artery     Physical Examination Review of Systems   /62 (BP Location: Right arm, Patient Position: Sitting, Cuff Size: Adult Regular)   Pulse 68   Resp 16   Ht 1.778 m (5' 10\")   Wt 108.3 kg (238 lb 12.8 oz)   BMI 34.26 kg/m    Body mass index is 34.26 kg/m .  Wt Readings from Last 3 Encounters:   10/27/22 108.3 kg (238 lb 12.8 oz)   10/17/22 104.3 kg (230 lb)   10/04/22 108 kg (238 lb)     General Appearance:   no distress, normal body habitus   ENT/Mouth: membranes moist, no oral lesions or bleeding gums.      EYES:  no scleral icterus, normal conjunctivae   Neck: no carotid bruits or thyromegaly   Chest/Lungs:   lungs are clear to auscultation, no rales or wheezing, equal chest wall expansion    Cardiovascular:   Heart rate regular. Normal first and second heart sounds with no murmurs, rubs, or gallops; the carotid, radial and posterior tibial pulses are intact, Jugular venous pressure flat with no edema bilaterally    Abdomen:  no organomegaly, masses, bruits, or tenderness; bowel sounds are present   Extremities  Puncture Site: no cyanosis or clubbing  Right radial site is soft with mild  bruising.  Radial pulses and Pedal pulses intact and symmetrical. CMS intact.   Skin: no xanthelasma, warm.    Neurologic: normal  bilateral, no tremors     Psychiatric: alert and oriented x3, calm            Negative unless noted in HPI     Medical History  Surgical History Family History Social History   Past Medical History:   Diagnosis Date     Cardiac murmur      Coronary artery disease      GERD " (gastroesophageal reflux disease)      High cholesterol      Hyperlipidemia      Hypertension     Past Surgical History:   Procedure Laterality Date     CARDIAC CATHETERIZATION  07/31/2017    AUREA to distal RCA     CARDIAC CATHETERIZATION N/A 7/31/2017    Procedure: Coronary Angiogram;  Surgeon: Dandre Love MD;  Location: [facility];  Service:      CORONARY STENT PLACEMENT       CV CORONARY ANGIOGRAM N/A 3/2/2021    Procedure: Coronary Angiogram;  Surgeon: Marivel Loo MD;  Location: [facility];  Service: Cardiology     CV CORONARY ANGIOGRAM N/A 10/17/2022    Procedure: Coronary Angiogram;  Surgeon: Manuel Mclain MD;  Location: [facility] CV     CV FRACTIONAL FLOW RATIO WIRE N/A 10/17/2022    Procedure: Fractional Flow Ratio Wire;  Surgeon: Manuel Mclain MD;  Location: [facility] CV     CV LEFT HEART CATH N/A 10/17/2022    Procedure: Left Heart Catheterization;  Surgeon: Manuel Mclain MD;  Location: [facility] CV     CV LEFT HEART CATHETERIZATION WITHOUT LEFT VENTRICULOGRAM Left 3/2/2021    Procedure: Left Heart Catheterization Without Left Ventriculogram;  Surgeon: Marivel Loo MD;  Location: [facility];  Service: Cardiology     CV PCI ANGIOPLASTY N/A 10/17/2022    Procedure: Percutaneous Coronary Intervention - Angioplasty;  Surgeon: Manuel Mclain MD;  Location: [facility] CV     CV PCI STENT DRUG ELUTING N/A 10/17/2022    Procedure: Percutaneous Coronary Intervention Stent;  Surgeon: Manuel Mclain MD;  Location: [facility] CV     FRACTURE SURGERY       HERNIA REPAIR       WI CATH PLMT L HRT & ARTS W/NJX & ANGIO IMG S&I Left 7/31/2017    Procedure: Left Heart Catheterization Without Left Ventriculogram;  Surgeon: Dandre Love MD;  Location: [facility];  Service: Cardiology     RELEASE CARPAL TUNNEL Right     No family history on file. Social History     Socioeconomic History      Marital status:      Spouse name: Not on file     Number of children: Not on file     Years of education: Not on file     Highest education level: Not on file   Occupational History     Not on file   Tobacco Use     Smoking status: Former     Types: Cigarettes, Cigars     Smokeless tobacco: Never     Tobacco comments:     still occasionally smokes cigars   Substance and Sexual Activity     Alcohol use: No     Drug use: Not Currently     Sexual activity: Not on file   Other Topics Concern     Not on file   Social History Narrative     Not on file     Social Determinants of Health     Financial Resource Strain: Not on file   Food Insecurity: Not on file   Transportation Needs: Not on file   Physical Activity: Not on file   Stress: Not on file   Social Connections: Not on file   Intimate Partner Violence: Not on file   Housing Stability: Not on file          Medications  Allergies   Current Outpatient Medications   Medication Sig Dispense Refill     aspirin (ASA) 81 MG EC tablet TAKE 1 TABLET (81 MG) BY MOUTH DAILY START TOMORROW. 90 tablet 2     atorvastatin (LIPITOR) 80 MG tablet Take 1 tablet (80 mg) by mouth daily 90 tablet 3     buPROPion (WELLBUTRIN SR) 100 MG 12 hr tablet Take 100 mg by mouth once       ezetimibe (ZETIA) 10 MG tablet Take 10 mg by mouth every evening       metoprolol succinate ER (TOPROL XL) 25 MG 24 hr tablet Take 0.5 tablets (12.5 mg) by mouth 2 times daily 60 tablet 12     omeprazole (PRILOSEC) 20 MG capsule [OMEPRAZOLE (PRILOSEC) 20 MG CAPSULE] Take 1 capsule (20 mg total) by mouth at bedtime. 30 capsule 0     polyvinyl alcohol/povidone (CLEAR EYES NATURAL TEARS OPHT) [POLYVINYL ALCOHOL/POVIDONE (CLEAR EYES NATURAL TEARS OPHT)] Apply 2 drops to eye 3 (three) times a day as needed.       ranolazine (RANEXA) 1000 MG TB12 12 hr tablet Take 1 tablet (1,000 mg) by mouth 2 times daily 180 tablet 3     sodium chloride (OCEAN) 0.65 % nasal spray [SODIUM CHLORIDE (OCEAN) 0.65 % NASAL  SPRAY] Apply 2 sprays into each nostril as needed for congestion.       tadalafiL (CIALIS) 20 MG tablet [TADALAFIL (CIALIS) 20 MG TABLET] Take 20 mg by mouth daily as needed.       ticagrelor (BRILINTA) 90 MG tablet Take 1 tablet (90 mg) by mouth 2 times daily Start this evening. 180 tablet 3    Allergies   Allergen Reactions     Lisinopril Cough         Lab Results    Chemistry/lipid CBC Cardiac Enzymes/BNP/TSH/INR   Lab Results   Component Value Date    CHOL 95 10/17/2022    HDL 37 (L) 10/17/2022    TRIG 79 10/17/2022    BUN 25.2 (H) 10/17/2022     10/17/2022    CO2 22 10/17/2022    Lab Results   Component Value Date    WBC 6.2 10/17/2022    HGB 13.1 (L) 10/17/2022    HCT 40.1 10/17/2022    MCV 90 10/17/2022     10/17/2022    Lab Results   Component Value Date    TROPONINI <0.01 06/17/2019    BNP 21 06/16/2019    TSH 2.64 06/14/2018        40 minutes spent on the date of encounter doing chart review, review of test results, interpretation with above tests, patient visit and documentation.      This note has been dictated using voice recognition software. Any grammatical, typographical, or context distortions are unintentional and inherent to the software

## 2022-10-27 ENCOUNTER — OFFICE VISIT (OUTPATIENT)
Dept: CARDIOLOGY | Facility: CLINIC | Age: 52
End: 2022-10-27
Payer: COMMERCIAL

## 2022-10-27 VITALS
WEIGHT: 238.8 LBS | HEIGHT: 70 IN | SYSTOLIC BLOOD PRESSURE: 112 MMHG | HEART RATE: 68 BPM | RESPIRATION RATE: 16 BRPM | DIASTOLIC BLOOD PRESSURE: 62 MMHG | BODY MASS INDEX: 34.19 KG/M2

## 2022-10-27 DIAGNOSIS — G47.33 OSA (OBSTRUCTIVE SLEEP APNEA): ICD-10-CM

## 2022-10-27 DIAGNOSIS — R06.09 DOE (DYSPNEA ON EXERTION): ICD-10-CM

## 2022-10-27 DIAGNOSIS — I10 ESSENTIAL HYPERTENSION: ICD-10-CM

## 2022-10-27 DIAGNOSIS — E78.2 MIXED HYPERLIPIDEMIA: ICD-10-CM

## 2022-10-27 DIAGNOSIS — I25.84 CORONARY ARTERY DISEASE DUE TO CALCIFIED CORONARY LESION: Primary | ICD-10-CM

## 2022-10-27 DIAGNOSIS — I25.10 CORONARY ARTERY DISEASE DUE TO CALCIFIED CORONARY LESION: Primary | ICD-10-CM

## 2022-10-27 DIAGNOSIS — R00.1 SINUS BRADYCARDIA: ICD-10-CM

## 2022-10-27 PROCEDURE — 99215 OFFICE O/P EST HI 40 MIN: CPT | Performed by: NURSE PRACTITIONER

## 2022-10-27 NOTE — PATIENT INSTRUCTIONS
Taurus Cotto,    It was a pleasure to see you today at the [facility].     My recommendations after this visit include:    - No medications changes made today    - Please seek medical attention if you develop recurrent chest pain or shortness of breath or similar symptoms    - Cardiac rehab as scheduled    - Follow up with Dr. Albarran in November as scheduled    - Please call [phone], if you have any questions or concerns    Elmer Armas CNP    Medication     Take all your medications as prescribed  Do not stop any medications without talking with a healthcare provider    Exercise      Physical activity is important for overall health  Set a goal of 150 minutes of exercise each week  For example, 30 minutes of exercise 5 days each week.    These 30 minutes can be broken into shorter periods of 15 minutes twice daily or 10 minutes three times daily  Start any exercise program slowly and work towards the goal of 150 minutes each week  For example, you may start with 10 minutes and plan to add a few minutes each week as you get stronger   Examples of exercise include walking, swimming, or biking  Remember to stretch and stay hydrated with exercise    Diet     A heart healthy diet includes:  A variety of fruits and vegetables  Whole grains  Low-fat dairy (fat-free, 1% fat, and low-fat)  Lean meats and poultry without skin   Fish (eat fish 2 times each week)  Nuts  Limit saturated fat to about 13 grams each day (based on a 2000 calorie diet)  Limit red meat  Limit sugars (sweets and sugary beverages)  Limit your portion sizes  Do not add salt to your food when cooking or at the table  Limit alcohol intake (no more than 1 drink each day for women or 2 drinks each day for men)    Weight Loss     Work on losing weight with diet and exercise  You BMI (body mass index) should be between 18.5-24.9  This is a calculation of your weight and height  Please ask your healthcare provider for your  BMI    Manage Other Chronic Health Conditions     Control cholesterol  Eat a diet low in saturated fat  Exercise   Take a statin medication as prescribed  Manage blood pressure  Eat a diet low in sodium  Exercise  Reduce stress  Lose weight   Take blood pressure medications as prescribed  Control blood sugars if diabetic  Monitor sugars and carbohydrates in your diet  Lose weight   Take diabetes medications as prescribed  Follow-up with your primary care provider to make sure your blood sugars are well controlled    Stress Reduction     Find time each day to relax  Reading, listening to music, yoga, meditation, exercise, spending time with friends and family, volunteering   Get 6-8 hours of sleep each night    Smoking Cessation     Smoking causes numerous health problems including coronary artery disease  It is never too late to quit  Set realistic goals for quitting  Decrease the number of cigarettes used each week  Use nicotine gum or patches to help you quit    Information from the [employer].  Please visit their website at www.heart.org

## 2022-10-27 NOTE — LETTER
10/27/2022    Nita Ratliff MD  [address]  [place] [address]    RE: Taurus Cotto       Dear Colleague,     I had the pleasure of seeing Taurus Cotto in the [facility].          Assessment/Recommendations   Assessment:    1.  Coronary artery disease with status post PCI to distal RCA in 2017 and LAD in 2021: Recent concern with accelerated angina with previous history of abnormal stress test.    Coronary angiogram on 10/17/2022 showed 70% proximal to mid vessel stenosis with positive FFR.  Lesion successfully treated with drug-eluting stents x2.  Patient was also noted to have moderate disease with 50% stenosis in mid circumflex negative FloWire assessment.    On dual antiplatelet therapy with ASA 81 mg indefinitely and Ticagrelor (Brilinta) 90 mg twice a dayfor 12 months.  After 12 months, he was recommended to stay on dual antiplatelet therapy with aspirin 81 mg and clopidogrel 75 mg indefinitely if tolerated.    Patient denies chest pain.  Reports his shortness of breath is unchanged from baseline.  He also feels mild shortness of breath sometimes at rest which he thinks is due to Brilinta.  He has done some walking since post PCI.  He does get some shortness of breath although it does not stop him from exercising.    Cardiac rehab has been initiated.    2.  Dyslipidemia with LDL goal <70/Obesity with a BMI of: Taurus Cotto is on high intensity statin therapy with Atorvastatin 80 mg daily and Zetia 10 mg daily. Most recent LDL is 42 and TGl 49 on 10/17/22    3.  Hypertension: His blood pressure is well controlled. Currently on Metoprolol Succinate 12.5 mg twice a day.    5.  Obstructive sleep apnea: On CPAP.  He is trying to use CPAP regularly as much as possible.  He has not followed up with sleep clinic for a while.  He does report some fatigue and daytime sleepiness which I think could be due to inconsistent use of CPAP.    Plan:  - We discussed the importance of  antiplatelet therapy and talking with his cardiologist prior to stopping these medications for any reason.  We discussed about utilization of as needed nitroglycerin.     -Given minimal side effect from Brilinta, will continue for now.    - Encouraged to seek medical attention if recurrent chest pain or shortness of breath.    - Risk factor modification and lifestyle management topics were discussed including managing comorbidities, weight loss, heart healthy diet, exercise and stress reduction.      - Cardiac rehab as scheduled    - We discussed a diet low in saturated fat, weight loss, and exercise along with medication for better control of cholesterol.    - Continue current hypertension regimen    Follow up with Dr. Albarran as scheduled in November     History of Present Illness/Subjective    Mr. Taurus Cotto is a 52 year old male with a past medical history of coronary disease with status post PCI to distal RCA in 2017, LAD in 2021, possible bicuspid aortic valve, hypertension, hyperlipidemia, obesity with BMI of 34.26,  Angina and recent status post PCI/AUREA to RCA on 10/17/2022 who is seen at [facility] for post coronary intervention follow up.     Today, Alec reports that his shortness of breath on exertion is unchanged from baseline.  He does not worse or better since recent stent placement.  He has noticed mild shortness of breath which is kind of new since recent stent placement and he suspects is due to Brilinta.  He denies lightheadedness, orthopnea, PND, palpitations, chest pain, abdominal fullness/bloating and lower extremity edema or bleeding complications.    Coronary Angiogram done on 10/17/22: reviewed:  Conclusion  Taurus Cotto is a 52 year old old male with CAD s/p prior PCI's to dRCA '17, LAD '21, possibly bicuspid AV, HTN, HL, high BMI who is here for w/u of accelerated angina.     - single-vessel obstructive CAD, confirmed to be FFR-positive in RCA and  "now s/p AUREA x2; Lcx was negative by flow wire assessment   - ASA 81mg daily indefinitely, ticagrelor 180mg once, followed by 90mg twice daily for at least 12 months. Clopidogrel 600mg once, then 75mg daily, beyond one year. Would continue DAPT indefinitely  - continue aggressive risk factor modification     Findings:  LM:no obstruction  LAD:patent mid-vessel stent w/ ~ 10% ISR    Lcx:mid-vessel 50% narrowing at the take off of OM1 and AV Lcx. PD/PA was 1 on both branches - no adenosine given  RCA:dominant, long area of up to 70% disease throughout prox to mid-vessel. PD/PA 0.98, FFR 0.79     LVEDP:17   Access:  R Radial artery     Physical Examination Review of Systems   /62 (BP Location: Right arm, Patient Position: Sitting, Cuff Size: Adult Regular)   Pulse 68   Resp 16   Ht 1.778 m (5' 10\")   Wt 108.3 kg (238 lb 12.8 oz)   BMI 34.26 kg/m    Body mass index is 34.26 kg/m .  Wt Readings from Last 3 Encounters:   10/27/22 108.3 kg (238 lb 12.8 oz)   10/17/22 104.3 kg (230 lb)   10/04/22 108 kg (238 lb)     General Appearance:   no distress, normal body habitus   ENT/Mouth: membranes moist, no oral lesions or bleeding gums.      EYES:  no scleral icterus, normal conjunctivae   Neck: no carotid bruits or thyromegaly   Chest/Lungs:   lungs are clear to auscultation, no rales or wheezing, equal chest wall expansion    Cardiovascular:   Heart rate regular. Normal first and second heart sounds with no murmurs, rubs, or gallops; the carotid, radial and posterior tibial pulses are intact, Jugular venous pressure flat with no edema bilaterally    Abdomen:  no organomegaly, masses, bruits, or tenderness; bowel sounds are present   Extremities  Puncture Site: no cyanosis or clubbing  Right radial site is soft with mild  bruising.  Radial pulses and Pedal pulses intact and symmetrical. CMS intact.   Skin: no xanthelasma, warm.    Neurologic: normal  bilateral, no tremors     Psychiatric: alert and oriented x3, " calm            Negative unless noted in HPI     Medical History  Surgical History Family History Social History   Past Medical History:   Diagnosis Date     Cardiac murmur      Coronary artery disease      GERD (gastroesophageal reflux disease)      High cholesterol      Hyperlipidemia      Hypertension     Past Surgical History:   Procedure Laterality Date     CARDIAC CATHETERIZATION  07/31/2017    AUREA to distal RCA     CARDIAC CATHETERIZATION N/A 7/31/2017    Procedure: Coronary Angiogram;  Surgeon: Dandre Love MD;  Location: [facility] Lab;  Service:      CORONARY STENT PLACEMENT       CV CORONARY ANGIOGRAM N/A 3/2/2021    Procedure: Coronary Angiogram;  Surgeon: Marivel Loo MD;  Location: [facility] Cardiac Cath Lab;  Service: Cardiology     CV CORONARY ANGIOGRAM N/A 10/17/2022    Procedure: Coronary Angiogram;  Surgeon: Manuel Mclain MD;  Location: [facility] CV     CV FRACTIONAL FLOW RATIO WIRE N/A 10/17/2022    Procedure: Fractional Flow Ratio Wire;  Surgeon: Manuel Mclain MD;  Location: [facility] CV     CV LEFT HEART CATH N/A 10/17/2022    Procedure: Left Heart Catheterization;  Surgeon: Manuel Mclain MD;  Location: [facility] LAB CV     CV LEFT HEART CATHETERIZATION WITHOUT LEFT VENTRICULOGRAM Left 3/2/2021    Procedure: Left Heart Catheterization Without Left Ventriculogram;  Surgeon: Marivel Loo MD;  Location: [facility] Cardiac Cath Lab;  Service: Cardiology     CV PCI ANGIOPLASTY N/A 10/17/2022    Procedure: Percutaneous Coronary Intervention - Angioplasty;  Surgeon: Manuel Mclain MD;  Location: [facility] CV     CV PCI STENT DRUG ELUTING N/A 10/17/2022    Procedure: Percutaneous Coronary Intervention Stent;  Surgeon: Manuel Mclain MD;  Location: [facility] CV     FRACTURE SURGERY       HERNIA REPAIR       NJ CATH PLMT L HRT & ARTS W/NJX & ANGIO IMG S&I Left 7/31/2017    Procedure: Left Heart Catheterization Without Left  Ventriculogram;  Surgeon: Dandre Love MD;  Location: [facility] Lab;  Service: Cardiology     RELEASE CARPAL TUNNEL Right     No family history on file. Social History     Socioeconomic History     Marital status:      Spouse name: Not on file     Number of children: Not on file     Years of education: Not on file     Highest education level: Not on file   Occupational History     Not on file   Tobacco Use     Smoking status: Former     Types: Cigarettes, Cigars     Smokeless tobacco: Never     Tobacco comments:     still occasionally smokes cigars   Substance and Sexual Activity     Alcohol use: No     Drug use: Not Currently     Sexual activity: Not on file   Other Topics Concern     Not on file   Social History Narrative     Not on file     Social Determinants of Health     Financial Resource Strain: Not on file   Food Insecurity: Not on file   Transportation Needs: Not on file   Physical Activity: Not on file   Stress: Not on file   Social Connections: Not on file   Intimate Partner Violence: Not on file   Housing Stability: Not on file          Medications  Allergies   Current Outpatient Medications   Medication Sig Dispense Refill     aspirin (ASA) 81 MG EC tablet TAKE 1 TABLET (81 MG) BY MOUTH DAILY START TOMORROW. 90 tablet 2     atorvastatin (LIPITOR) 80 MG tablet Take 1 tablet (80 mg) by mouth daily 90 tablet 3     buPROPion (WELLBUTRIN SR) 100 MG 12 hr tablet Take 100 mg by mouth once       ezetimibe (ZETIA) 10 MG tablet Take 10 mg by mouth every evening       metoprolol succinate ER (TOPROL XL) 25 MG 24 hr tablet Take 0.5 tablets (12.5 mg) by mouth 2 times daily 60 tablet 12     omeprazole (PRILOSEC) 20 MG capsule [OMEPRAZOLE (PRILOSEC) 20 MG CAPSULE] Take 1 capsule (20 mg total) by mouth at bedtime. 30 capsule 0     polyvinyl alcohol/povidone (CLEAR EYES NATURAL TEARS OPHT) [POLYVINYL ALCOHOL/POVIDONE (CLEAR EYES NATURAL TEARS OPHT)] Apply 2 drops to eye 3 (three) times a day as  needed.       ranolazine (RANEXA) 1000 MG TB12 12 hr tablet Take 1 tablet (1,000 mg) by mouth 2 times daily 180 tablet 3     sodium chloride (OCEAN) 0.65 % nasal spray [SODIUM CHLORIDE (OCEAN) 0.65 % NASAL SPRAY] Apply 2 sprays into each nostril as needed for congestion.       tadalafiL (CIALIS) 20 MG tablet [TADALAFIL (CIALIS) 20 MG TABLET] Take 20 mg by mouth daily as needed.       ticagrelor (BRILINTA) 90 MG tablet Take 1 tablet (90 mg) by mouth 2 times daily Start this evening. 180 tablet 3    Allergies   Allergen Reactions     Lisinopril Cough         Lab Results    Chemistry/lipid CBC Cardiac Enzymes/BNP/TSH/INR   Lab Results   Component Value Date    CHOL 95 10/17/2022    HDL 37 (L) 10/17/2022    TRIG 79 10/17/2022    BUN 25.2 (H) 10/17/2022     10/17/2022    CO2 22 10/17/2022    Lab Results   Component Value Date    WBC 6.2 10/17/2022    HGB 13.1 (L) 10/17/2022    HCT 40.1 10/17/2022    MCV 90 10/17/2022     10/17/2022    Lab Results   Component Value Date    TROPONINI <0.01 06/17/2019    BNP 21 06/16/2019    TSH 2.64 06/14/2018        40 minutes spent on the date of encounter doing chart review, review of test results, interpretation with above tests, patient visit and documentation.      This note has been dictated using voice recognition software. Any grammatical, typographical, or context distortions are unintentional and inherent to the software          Thank you for allowing me to participate in the care of your patient.      Sincerely,     JAELYN Rouse [facility]  cc:   Manuel Mclain MD  [address] [address]  [place] [address]

## 2022-10-31 ENCOUNTER — HOSPITAL ENCOUNTER (OUTPATIENT)
Dept: CARDIAC REHAB | Facility: HOSPITAL | Age: 52
Discharge: HOME | End: 2022-10-31
Attending: INTERNAL MEDICINE
Payer: COMMERCIAL

## 2022-10-31 PROCEDURE — 93798 PHYS/QHP OP CAR RHAB W/ECG: CPT

## 2022-11-02 ENCOUNTER — HOSPITAL ENCOUNTER (OUTPATIENT)
Dept: CARDIAC REHAB | Facility: HOSPITAL | Age: 52
Discharge: HOME | End: 2022-11-02
Attending: INTERNAL MEDICINE
Payer: COMMERCIAL

## 2022-11-02 PROCEDURE — 93798 PHYS/QHP OP CAR RHAB W/ECG: CPT

## 2022-11-04 ENCOUNTER — HOSPITAL ENCOUNTER (OUTPATIENT)
Dept: CARDIAC REHAB | Facility: HOSPITAL | Age: 52
Discharge: HOME | End: 2022-11-04
Attending: INTERNAL MEDICINE
Payer: COMMERCIAL

## 2022-11-04 PROCEDURE — 93798 PHYS/QHP OP CAR RHAB W/ECG: CPT

## 2022-11-07 ENCOUNTER — HOSPITAL ENCOUNTER (OUTPATIENT)
Dept: CARDIAC REHAB | Facility: HOSPITAL | Age: 52
Discharge: HOME | End: 2022-11-07
Attending: INTERNAL MEDICINE
Payer: COMMERCIAL

## 2022-11-07 PROCEDURE — 93798 PHYS/QHP OP CAR RHAB W/ECG: CPT

## 2022-11-08 ENCOUNTER — OFFICE VISIT (OUTPATIENT)
Dept: CARDIOLOGY | Facility: CLINIC | Age: 52
End: 2022-11-08
Payer: COMMERCIAL

## 2022-11-08 VITALS
DIASTOLIC BLOOD PRESSURE: 74 MMHG | WEIGHT: 235.9 LBS | RESPIRATION RATE: 12 BRPM | SYSTOLIC BLOOD PRESSURE: 116 MMHG | HEART RATE: 64 BPM | HEIGHT: 70 IN | BODY MASS INDEX: 33.77 KG/M2

## 2022-11-08 DIAGNOSIS — I25.10 CORONARY ARTERY DISEASE DUE TO CALCIFIED CORONARY LESION: Primary | ICD-10-CM

## 2022-11-08 DIAGNOSIS — I10 ESSENTIAL HYPERTENSION: ICD-10-CM

## 2022-11-08 DIAGNOSIS — R00.1 SINUS BRADYCARDIA: ICD-10-CM

## 2022-11-08 DIAGNOSIS — G47.33 OSA (OBSTRUCTIVE SLEEP APNEA): ICD-10-CM

## 2022-11-08 DIAGNOSIS — E66.9 OBESITY (BMI 35.0-39.9 WITHOUT COMORBIDITY): ICD-10-CM

## 2022-11-08 DIAGNOSIS — I25.84 CORONARY ARTERY DISEASE DUE TO CALCIFIED CORONARY LESION: Primary | ICD-10-CM

## 2022-11-08 DIAGNOSIS — Q23.81 BICUSPID AORTIC VALVE: ICD-10-CM

## 2022-11-08 DIAGNOSIS — E78.2 MIXED HYPERLIPIDEMIA: ICD-10-CM

## 2022-11-08 LAB — APO A-I SERPL-MCNC: 75 MG/DL

## 2022-11-08 PROCEDURE — 36415 COLL VENOUS BLD VENIPUNCTURE: CPT | Performed by: INTERNAL MEDICINE

## 2022-11-08 PROCEDURE — 83695 ASSAY OF LIPOPROTEIN(A): CPT | Performed by: INTERNAL MEDICINE

## 2022-11-08 PROCEDURE — 99214 OFFICE O/P EST MOD 30 MIN: CPT | Performed by: INTERNAL MEDICINE

## 2022-11-08 NOTE — PROGRESS NOTES
[facility]  Heart Care Clinic Follow-up Note    Assessment & Plan        (I25.10,  I25.84) Coronary artery disease due to calcified coronary lesion  (primary encounter diagnosis)  Comment: Given increased chest discomfort he underwent angiography October 17, 2022 showing normal left main, mid LAD in-stent 10% restenosis, circumflex with mid 50% stenosis in the right coronary artery with a 70% proximal to mid stenosis that was significant by FFR and received Synergy AUREA 3.5 x 38 mm and 3.5 x 12 mm stents.  Currently asymptomatic and work on prevention.  That would include possibly adding ACE inhibitor with lower dose of metoprolol a year out from event, losing weight, chronic Plavix since he has had recurrent issues in place of aspirin, and check LP(a).    (I10) Essential hypertension  Comment: Under good control on metoprolol.    (R00.1) Sinus bradycardia  Comment: Secondary to metoprolol.    (Q23.1) Bicuspid aortic valve  Comment: Based on echo from January 2020 dimensionless index 0.5 with a valve area 1.6 cm , mean gradient of 9 mmHg, peak gradient of 16 mmHg mean velocity 1.4 m/s.  Most recent echo however read as tricuspid with aortic insufficiency +1, dimensionless index 0.6 with mean gradient of 10 and peak gradient of 16 with mean velocity 2.0 m/s.  May need cardiac MRI in the future , we will plan on this May 2023 a year out from prior echocardiogram.     (E66.9) Obesity (BMI 35.0-39.9 without comorbidity)  Comment: Work on weight loss.    (G47.33) IDA (obstructive sleep apnea)  Comment: Nightly CPAP.    (E78.2) Mixed hyperlipidemia  Comment: Total cholesterol excellent at 95 with an LDL of 42 as above check LP(a).    Plan  1.  Check LP(a).  2.  Work on weight loss.  3.  Follow-up with me in April or sooner if needed.    Subjective  CC: 52-year-old white gentleman being seen in post hospital discharge follow-up. Patient complains of no syncope, dizziness, fatigue, fevers, chest pain,  "palpitations, shortness of breath, PND, orthopnea, nausea, vomiting, or edema.    Medications  Current Outpatient Medications   Medication Sig Dispense Refill     aspirin (ASA) 81 MG EC tablet TAKE 1 TABLET (81 MG) BY MOUTH DAILY START TOMORROW. 90 tablet 2     atorvastatin (LIPITOR) 80 MG tablet Take 1 tablet (80 mg) by mouth daily 90 tablet 3     buPROPion (WELLBUTRIN SR) 100 MG 12 hr tablet Take 100 mg by mouth daily       ezetimibe (ZETIA) 10 MG tablet Take 10 mg by mouth every evening       metoprolol succinate ER (TOPROL XL) 25 MG 24 hr tablet Take 0.5 tablets (12.5 mg) by mouth 2 times daily 60 tablet 12     omeprazole (PRILOSEC) 20 MG capsule [OMEPRAZOLE (PRILOSEC) 20 MG CAPSULE] Take 1 capsule (20 mg total) by mouth at bedtime. 30 capsule 0     polyvinyl alcohol/povidone (CLEAR EYES NATURAL TEARS OPHT) [POLYVINYL ALCOHOL/POVIDONE (CLEAR EYES NATURAL TEARS OPHT)] Apply 2 drops to eye 3 (three) times a day as needed.       ranolazine (RANEXA) 1000 MG TB12 12 hr tablet Take 1 tablet (1,000 mg) by mouth 2 times daily 180 tablet 3     sodium chloride (OCEAN) 0.65 % nasal spray [SODIUM CHLORIDE (OCEAN) 0.65 % NASAL SPRAY] Apply 2 sprays into each nostril as needed for congestion.       tadalafiL (CIALIS) 20 MG tablet [TADALAFIL (CIALIS) 20 MG TABLET] Take 20 mg by mouth daily as needed.       ticagrelor (BRILINTA) 90 MG tablet Take 1 tablet (90 mg) by mouth 2 times daily Start this evening. 180 tablet 3       Objective  /74 (BP Location: Right arm, Patient Position: Sitting, Cuff Size: Adult Regular)   Pulse 64   Resp 12   Ht 1.778 m (5' 10\")   Wt 107 kg (235 lb 14.4 oz)   BMI 33.85 kg/m      General Appearance:    Alert, cooperative, no distress, appears stated age   Head:    Normocephalic, without obvious abnormality, atraumatic   Throat:   Lips, mucosa, and tongue normal; teeth and gums normal   Neck:   Supple, symmetrical, trachea midline, no adenopathy;        thyroid:  No " enlargement/tenderness/nodules; no carotid    bruit or JVD   Back:     Symmetric, no curvature, ROM normal, no CVA tenderness   Lungs:     Clear to auscultation bilaterally, respirations unlabored   Chest wall:    No tenderness or deformity   Heart:    Regular rate and rhythm, S1 and S2 normal, no murmur, rub   or gallop   Abdomen:     Soft, non-tender, bowel sounds active all four quadrants,     no masses, no organomegaly   Extremities:   Normal, atraumatic, no cyanosis or edema   Pulses:   2+ and symmetric all extremities   Skin:   Skin color, texture, turgor normal, no rashes or lesions     Results    Lab Results personally reviewed   Lab Results   Component Value Date    CHOL 95 10/17/2022    CHOL 110 03/11/2022     Lab Results   Component Value Date    HDL 37 (L) 10/17/2022    HDL 36 (L) 03/11/2022     No components found for: LDLCALC  Lab Results   Component Value Date    TRIG 79 10/17/2022    TRIG 86 03/11/2022     Lab Results   Component Value Date    WBC 6.2 10/17/2022    HGB 13.1 (L) 10/17/2022    HCT 40.1 10/17/2022     10/17/2022     Lab Results   Component Value Date    BUN 25.2 (H) 10/17/2022     10/17/2022    CO2 22 10/17/2022

## 2022-11-08 NOTE — PATIENT INSTRUCTIONS
Mr Taurus Cotto,  I enjoyed visiting with you again today.  I am glad to hear you are doing well.  Per our conversation let me check the LP(a) today and if that is elevated we will discuss ways of lowering it.  I will plan on seeing you around April, plan the MRI of the valve in May, and in October we will probably back off on the ticagrelor to Plavix and also maybe add some lisinopril with backing off on the metoprolol.  I will plan on seeing you April unless issues arise further.  Masoud Albarran

## 2022-11-08 NOTE — LETTER
11/8/2022    Nita Ratliff MD  [address]  [place] [address]    RE: Taurus Cotto       Dear Colleague,     I had the [facility]to in the [facility].      [facility]  Heart Care Clinic Follow-up Note    Assessment & Plan        (I25.10,  I25.84) Coronary artery disease due to calcified coronary lesion  (primary encounter diagnosis)  Comment: Given increased chest discomfort he underwent angiography October 17, 2022 showing normal left main, mid LAD in-stent 10% restenosis, circumflex with mid 50% stenosis in the right coronary artery with a 70% proximal to mid stenosis that was significant by FFR and received Synergy AUREA 3.5 x 38 mm and 3.5 x 12 mm stents.  Currently asymptomatic and work on prevention.  That would include possibly adding ACE inhibitor with lower dose of metoprolol a year out from event, losing weight, chronic Plavix since he has had recurrent issues in place of aspirin, and check LP(a).    (I10) Essential hypertension  Comment: Under good control on metoprolol.    (R00.1) Sinus bradycardia  Comment: Secondary to metoprolol.    (Q23.1) Bicuspid aortic valve  Comment: Based on echo from January 2020 dimensionless index 0.5 with a valve area 1.6 cm , mean gradient of 9 mmHg, peak gradient of 16 mmHg mean velocity 1.4 m/s.  Most recent echo however read as tricuspid with aortic insufficiency +1, dimensionless index 0.6 with mean gradient of 10 and peak gradient of 16 with mean velocity 2.0 m/s.  May need cardiac MRI in the future , we will plan on this May 2023 a year out from prior echocardiogram.     (E66.9) Obesity (BMI 35.0-39.9 without comorbidity)  Comment: Work on weight loss.    (G47.33) IDA (obstructive sleep apnea)  Comment: Nightly CPAP.    (E78.2) Mixed hyperlipidemia  Comment: Total cholesterol excellent at 95 with an LDL of 42 as above check LP(a).    Plan  1.  Check LP(a).  2.  Work on weight loss.  3.  Follow-up with me in April  "or sooner if needed.    Subjective  CC: 52-year-old white gentleman being seen in post hospital discharge follow-up. Patient complains of no syncope, dizziness, fatigue, fevers, chest pain, palpitations, shortness of breath, PND, orthopnea, nausea, vomiting, or edema.    Medications  Current Outpatient Medications   Medication Sig Dispense Refill     aspirin (ASA) 81 MG EC tablet TAKE 1 TABLET (81 MG) BY MOUTH DAILY START TOMORROW. 90 tablet 2     atorvastatin (LIPITOR) 80 MG tablet Take 1 tablet (80 mg) by mouth daily 90 tablet 3     buPROPion (WELLBUTRIN SR) 100 MG 12 hr tablet Take 100 mg by mouth daily       ezetimibe (ZETIA) 10 MG tablet Take 10 mg by mouth every evening       metoprolol succinate ER (TOPROL XL) 25 MG 24 hr tablet Take 0.5 tablets (12.5 mg) by mouth 2 times daily 60 tablet 12     omeprazole (PRILOSEC) 20 MG capsule [OMEPRAZOLE (PRILOSEC) 20 MG CAPSULE] Take 1 capsule (20 mg total) by mouth at bedtime. 30 capsule 0     polyvinyl alcohol/povidone (CLEAR EYES NATURAL TEARS OPHT) [POLYVINYL ALCOHOL/POVIDONE (CLEAR EYES NATURAL TEARS OPHT)] Apply 2 drops to eye 3 (three) times a day as needed.       ranolazine (RANEXA) 1000 MG TB12 12 hr tablet Take 1 tablet (1,000 mg) by mouth 2 times daily 180 tablet 3     sodium chloride (OCEAN) 0.65 % nasal spray [SODIUM CHLORIDE (OCEAN) 0.65 % NASAL SPRAY] Apply 2 sprays into each nostril as needed for congestion.       tadalafiL (CIALIS) 20 MG tablet [TADALAFIL (CIALIS) 20 MG TABLET] Take 20 mg by mouth daily as needed.       ticagrelor (BRILINTA) 90 MG tablet Take 1 tablet (90 mg) by mouth 2 times daily Start this evening. 180 tablet 3       Objective  /74 (BP Location: Right arm, Patient Position: Sitting, Cuff Size: Adult Regular)   Pulse 64   Resp 12   Ht 1.778 m (5' 10\")   Wt 107 kg (235 lb 14.4 oz)   BMI 33.85 kg/m      General Appearance:    Alert, cooperative, no distress, appears stated age   Head:    Normocephalic, without obvious " abnormality, atraumatic   Throat:   Lips, mucosa, and tongue normal; teeth and gums normal   Neck:   Supple, symmetrical, trachea midline, no adenopathy;        thyroid:  No enlargement/tenderness/nodules; no carotid    bruit or JVD   Back:     Symmetric, no curvature, ROM normal, no CVA tenderness   Lungs:     Clear to auscultation bilaterally, respirations unlabored   Chest wall:    No tenderness or deformity   Heart:    Regular rate and rhythm, S1 and S2 normal, no murmur, rub   or gallop   Abdomen:     Soft, non-tender, bowel sounds active all four quadrants,     no masses, no organomegaly   Extremities:   Normal, atraumatic, no cyanosis or edema   Pulses:   2+ and symmetric all extremities   Skin:   Skin color, texture, turgor normal, no rashes or lesions     Results    Lab Results personally reviewed   Lab Results   Component Value Date    CHOL 95 10/17/2022    CHOL 110 03/11/2022     Lab Results   Component Value Date    HDL 37 (L) 10/17/2022    HDL 36 (L) 03/11/2022     No components found for: LDLCALC  Lab Results   Component Value Date    TRIG 79 10/17/2022    TRIG 86 03/11/2022     Lab Results   Component Value Date    WBC 6.2 10/17/2022    HGB 13.1 (L) 10/17/2022    HCT 40.1 10/17/2022     10/17/2022     Lab Results   Component Value Date    BUN 25.2 (H) 10/17/2022     10/17/2022    CO2 22 10/17/2022               Thank you for allowing me to participate in the care of your patient.      Sincerely,     WILLAM MARTÍNEZ MD     [facility]  cc:   Manuel Mclain MD  [address] [address]  [place] [address]

## 2022-11-09 ENCOUNTER — HOSPITAL ENCOUNTER (OUTPATIENT)
Dept: CARDIAC REHAB | Facility: HOSPITAL | Age: 52
Discharge: HOME | End: 2022-11-09
Attending: INTERNAL MEDICINE
Payer: COMMERCIAL

## 2022-11-09 PROCEDURE — 93798 PHYS/QHP OP CAR RHAB W/ECG: CPT

## 2022-11-10 DIAGNOSIS — I25.10 CORONARY ARTERY DISEASE DUE TO CALCIFIED CORONARY LESION: Primary | ICD-10-CM

## 2022-11-10 DIAGNOSIS — I25.84 CORONARY ARTERY DISEASE DUE TO CALCIFIED CORONARY LESION: Primary | ICD-10-CM

## 2022-11-10 RX ORDER — NIACIN 500 MG/1
500 TABLET, EXTENDED RELEASE ORAL AT BEDTIME
Qty: 90 TABLET | Refills: 0 | Status: SHIPPED | OUTPATIENT
Start: 2022-11-10 | End: 2023-01-02

## 2022-11-11 ENCOUNTER — HOSPITAL ENCOUNTER (OUTPATIENT)
Dept: CARDIAC REHAB | Facility: HOSPITAL | Age: 52
Discharge: HOME | End: 2022-11-11
Attending: INTERNAL MEDICINE
Payer: COMMERCIAL

## 2022-11-11 PROCEDURE — 93798 PHYS/QHP OP CAR RHAB W/ECG: CPT

## 2022-11-14 ENCOUNTER — HOSPITAL ENCOUNTER (OUTPATIENT)
Dept: CARDIAC REHAB | Facility: HOSPITAL | Age: 52
Discharge: HOME | End: 2022-11-14
Attending: INTERNAL MEDICINE
Payer: COMMERCIAL

## 2022-11-14 PROCEDURE — 93798 PHYS/QHP OP CAR RHAB W/ECG: CPT

## 2022-11-16 ENCOUNTER — HOSPITAL ENCOUNTER (OUTPATIENT)
Dept: CARDIAC REHAB | Facility: HOSPITAL | Age: 52
Discharge: HOME | End: 2022-11-16
Attending: INTERNAL MEDICINE
Payer: COMMERCIAL

## 2022-11-16 PROCEDURE — 93798 PHYS/QHP OP CAR RHAB W/ECG: CPT

## 2022-11-18 ENCOUNTER — HOSPITAL ENCOUNTER (OUTPATIENT)
Dept: CARDIAC REHAB | Facility: HOSPITAL | Age: 52
Discharge: HOME | End: 2022-11-18
Attending: INTERNAL MEDICINE
Payer: COMMERCIAL

## 2022-11-18 PROCEDURE — 93798 PHYS/QHP OP CAR RHAB W/ECG: CPT

## 2022-11-21 ENCOUNTER — HOSPITAL ENCOUNTER (OUTPATIENT)
Dept: CARDIAC REHAB | Facility: HOSPITAL | Age: 52
Discharge: HOME | End: 2022-11-21
Attending: INTERNAL MEDICINE
Payer: COMMERCIAL

## 2022-11-21 PROCEDURE — 93798 PHYS/QHP OP CAR RHAB W/ECG: CPT

## 2022-11-23 ENCOUNTER — HOSPITAL ENCOUNTER (OUTPATIENT)
Dept: CARDIAC REHAB | Facility: HOSPITAL | Age: 52
Discharge: HOME | End: 2022-11-23
Attending: INTERNAL MEDICINE
Payer: COMMERCIAL

## 2022-11-23 PROCEDURE — 93798 PHYS/QHP OP CAR RHAB W/ECG: CPT

## 2022-11-28 ENCOUNTER — HOSPITAL ENCOUNTER (OUTPATIENT)
Dept: CARDIAC REHAB | Facility: HOSPITAL | Age: 52
Discharge: HOME | End: 2022-11-28
Attending: INTERNAL MEDICINE
Payer: COMMERCIAL

## 2022-11-28 PROCEDURE — 93798 PHYS/QHP OP CAR RHAB W/ECG: CPT

## 2022-12-07 ENCOUNTER — HOSPITAL ENCOUNTER (OUTPATIENT)
Dept: CARDIAC REHAB | Facility: HOSPITAL | Age: 52
Discharge: HOME | End: 2022-12-07
Attending: INTERNAL MEDICINE
Payer: COMMERCIAL

## 2022-12-07 PROCEDURE — 93798 PHYS/QHP OP CAR RHAB W/ECG: CPT

## 2022-12-09 ENCOUNTER — HOSPITAL ENCOUNTER (OUTPATIENT)
Dept: CARDIAC REHAB | Facility: HOSPITAL | Age: 52
Discharge: HOME | End: 2022-12-09
Attending: INTERNAL MEDICINE
Payer: COMMERCIAL

## 2022-12-09 PROCEDURE — 93798 PHYS/QHP OP CAR RHAB W/ECG: CPT

## 2022-12-12 ENCOUNTER — HOSPITAL ENCOUNTER (OUTPATIENT)
Dept: CARDIAC REHAB | Facility: HOSPITAL | Age: 52
Discharge: HOME | End: 2022-12-12
Attending: INTERNAL MEDICINE
Payer: COMMERCIAL

## 2022-12-12 PROCEDURE — 93798 PHYS/QHP OP CAR RHAB W/ECG: CPT

## 2022-12-14 ENCOUNTER — HOSPITAL ENCOUNTER (OUTPATIENT)
Dept: CARDIAC REHAB | Facility: HOSPITAL | Age: 52
Discharge: HOME | End: 2022-12-14
Attending: INTERNAL MEDICINE
Payer: COMMERCIAL

## 2022-12-14 PROCEDURE — 93798 PHYS/QHP OP CAR RHAB W/ECG: CPT

## 2022-12-16 ENCOUNTER — HOSPITAL ENCOUNTER (OUTPATIENT)
Dept: CARDIAC REHAB | Facility: HOSPITAL | Age: 52
Discharge: HOME | End: 2022-12-16
Attending: INTERNAL MEDICINE
Payer: COMMERCIAL

## 2022-12-16 PROCEDURE — 93798 PHYS/QHP OP CAR RHAB W/ECG: CPT

## 2022-12-19 ENCOUNTER — HOSPITAL ENCOUNTER (OUTPATIENT)
Dept: CARDIAC REHAB | Facility: HOSPITAL | Age: 52
Discharge: HOME | End: 2022-12-19
Attending: INTERNAL MEDICINE
Payer: COMMERCIAL

## 2022-12-19 PROCEDURE — 93798 PHYS/QHP OP CAR RHAB W/ECG: CPT

## 2022-12-21 ENCOUNTER — HOSPITAL ENCOUNTER (OUTPATIENT)
Dept: CARDIAC REHAB | Facility: HOSPITAL | Age: 52
Discharge: HOME | End: 2022-12-21
Attending: INTERNAL MEDICINE
Payer: COMMERCIAL

## 2022-12-21 PROCEDURE — 93797 PHYS/QHP OP CAR RHAB WO ECG: CPT | Mod: 59

## 2022-12-21 PROCEDURE — 93798 PHYS/QHP OP CAR RHAB W/ECG: CPT

## 2022-12-24 ENCOUNTER — APPOINTMENT (OUTPATIENT)
Dept: RADIOLOGY | Facility: HOSPITAL | Age: 52
End: 2022-12-24
Attending: EMERGENCY MEDICINE
Payer: COMMERCIAL

## 2022-12-24 ENCOUNTER — HOSPITAL ENCOUNTER (EMERGENCY)
Facility: HOSPITAL | Age: 52
Discharge: HOME | End: 2022-12-24
Attending: EMERGENCY MEDICINE | Admitting: EMERGENCY MEDICINE
Payer: COMMERCIAL

## 2022-12-24 VITALS
TEMPERATURE: 98.1 F | SYSTOLIC BLOOD PRESSURE: 142 MMHG | BODY MASS INDEX: 32.71 KG/M2 | RESPIRATION RATE: 25 BRPM | WEIGHT: 228 LBS | HEART RATE: 52 BPM | DIASTOLIC BLOOD PRESSURE: 80 MMHG | OXYGEN SATURATION: 97 %

## 2022-12-24 DIAGNOSIS — R07.9 CHEST PAIN, UNSPECIFIED TYPE: ICD-10-CM

## 2022-12-24 LAB
ANION GAP SERPL CALCULATED.3IONS-SCNC: 11 MMOL/L (ref 7–15)
BASOPHILS # BLD AUTO: 0 10E3/UL (ref 0–0.2)
BASOPHILS NFR BLD AUTO: 1 %
BUN SERPL-MCNC: 19.8 MG/DL (ref 6–20)
CALCIUM SERPL-MCNC: 9.4 MG/DL (ref 8.6–10)
CHLORIDE SERPL-SCNC: 103 MMOL/L (ref 98–107)
CREAT SERPL-MCNC: 1.07 MG/DL (ref 0.67–1.17)
DEPRECATED HCO3 PLAS-SCNC: 24 MMOL/L (ref 22–29)
EOSINOPHIL # BLD AUTO: 0.1 10E3/UL (ref 0–0.7)
EOSINOPHIL NFR BLD AUTO: 3 %
ERYTHROCYTE [DISTWIDTH] IN BLOOD BY AUTOMATED COUNT: 12.6 % (ref 10–15)
GFR SERPL CREATININE-BSD FRML MDRD: 83 ML/MIN/1.73M2
GLUCOSE SERPL-MCNC: 83 MG/DL (ref 70–99)
HCT VFR BLD AUTO: 39.6 % (ref 40–53)
HGB BLD-MCNC: 13.4 G/DL (ref 13.3–17.7)
HOLD SPECIMEN: NORMAL
IMM GRANULOCYTES # BLD: 0 10E3/UL
IMM GRANULOCYTES NFR BLD: 1 %
LYMPHOCYTES # BLD AUTO: 1.5 10E3/UL (ref 0.8–5.3)
LYMPHOCYTES NFR BLD AUTO: 34 %
MCH RBC QN AUTO: 30 PG (ref 26.5–33)
MCHC RBC AUTO-ENTMCNC: 33.8 G/DL (ref 31.5–36.5)
MCV RBC AUTO: 89 FL (ref 78–100)
MONOCYTES # BLD AUTO: 0.5 10E3/UL (ref 0–1.3)
MONOCYTES NFR BLD AUTO: 12 %
NEUTROPHILS # BLD AUTO: 2.3 10E3/UL (ref 1.6–8.3)
NEUTROPHILS NFR BLD AUTO: 49 %
NRBC # BLD AUTO: 0 10E3/UL
NRBC BLD AUTO-RTO: 0 /100
PLATELET # BLD AUTO: 231 10E3/UL (ref 150–450)
POTASSIUM SERPL-SCNC: 4.3 MMOL/L (ref 3.4–5.3)
RBC # BLD AUTO: 4.46 10E6/UL (ref 4.4–5.9)
SODIUM SERPL-SCNC: 138 MMOL/L (ref 136–145)
TROPONIN T SERPL HS-MCNC: 8 NG/L
TROPONIN T SERPL HS-MCNC: 9 NG/L
WBC # BLD AUTO: 4.5 10E3/UL (ref 4–11)

## 2022-12-24 PROCEDURE — 99285 EMERGENCY DEPT VISIT HI MDM: CPT | Mod: 25

## 2022-12-24 PROCEDURE — 36415 COLL VENOUS BLD VENIPUNCTURE: CPT | Performed by: EMERGENCY MEDICINE

## 2022-12-24 PROCEDURE — 85025 COMPLETE CBC W/AUTO DIFF WBC: CPT | Performed by: EMERGENCY MEDICINE

## 2022-12-24 PROCEDURE — 250N000013 HC RX MED GY IP 250 OP 250 PS 637: Performed by: EMERGENCY MEDICINE

## 2022-12-24 PROCEDURE — 84484 ASSAY OF TROPONIN QUANT: CPT | Performed by: EMERGENCY MEDICINE

## 2022-12-24 PROCEDURE — 84484 ASSAY OF TROPONIN QUANT: CPT | Mod: 91 | Performed by: EMERGENCY MEDICINE

## 2022-12-24 PROCEDURE — 71045 X-RAY EXAM CHEST 1 VIEW: CPT

## 2022-12-24 PROCEDURE — 80048 BASIC METABOLIC PNL TOTAL CA: CPT | Performed by: EMERGENCY MEDICINE

## 2022-12-24 PROCEDURE — 93005 ELECTROCARDIOGRAM TRACING: CPT | Performed by: EMERGENCY MEDICINE

## 2022-12-24 PROCEDURE — 93005 ELECTROCARDIOGRAM TRACING: CPT | Performed by: STUDENT IN AN ORGANIZED HEALTH CARE EDUCATION/TRAINING PROGRAM

## 2022-12-24 PROCEDURE — 36415 COLL VENOUS BLD VENIPUNCTURE: CPT | Performed by: STUDENT IN AN ORGANIZED HEALTH CARE EDUCATION/TRAINING PROGRAM

## 2022-12-24 RX ORDER — ASPIRIN 81 MG/1
324 TABLET, CHEWABLE ORAL ONCE
Status: COMPLETED | OUTPATIENT
Start: 2022-12-24 | End: 2022-12-24

## 2022-12-24 RX ORDER — ASPIRIN 81 MG/1
243 TABLET, CHEWABLE ORAL ONCE
Status: COMPLETED | OUTPATIENT
Start: 2022-12-24 | End: 2022-12-24

## 2022-12-24 RX ORDER — NITROGLYCERIN 0.4 MG/1
0.4 TABLET SUBLINGUAL EVERY 5 MIN PRN
Status: DISCONTINUED | OUTPATIENT
Start: 2022-12-24 | End: 2022-12-24 | Stop reason: HOSPADM

## 2022-12-24 RX ORDER — ASPIRIN 81 MG/1
324 TABLET, CHEWABLE ORAL ONCE
Status: DISCONTINUED | OUTPATIENT
Start: 2022-12-24 | End: 2022-12-24 | Stop reason: CLARIF

## 2022-12-24 RX ADMIN — ASPIRIN 81 MG CHEWABLE TABLET 243 MG: 81 TABLET CHEWABLE at 13:48

## 2022-12-24 RX ADMIN — NITROGLYCERIN 0.4 MG: 0.4 TABLET, ORALLY DISINTEGRATING SUBLINGUAL at 13:48

## 2022-12-24 ASSESSMENT — ENCOUNTER SYMPTOMS
ABDOMINAL PAIN: 0
HEADACHES: 0
DIZZINESS: 1
ARTHRALGIAS: 0
CONFUSION: 0
NECK STIFFNESS: 0
DIFFICULTY URINATING: 0
SHORTNESS OF BREATH: 1
EYE REDNESS: 0
FEVER: 0
COLOR CHANGE: 0

## 2022-12-24 ASSESSMENT — ACTIVITIES OF DAILY LIVING (ADL)
ADLS_ACUITY_SCORE: 35
ADLS_ACUITY_SCORE: 35

## 2022-12-24 NOTE — ED TRIAGE NOTES
"Patient states hx of heart disease.  Has some baseline angina which has been unchanged but now having new jaw pain.  States feeling SOB- \"Like I need oxygen.\"  Patient RA sats at 95% and able to speak in full sentences.  Also c/o lightheadedness.       "

## 2022-12-24 NOTE — ED NOTES
"EMERGENCY DEPARTMENT SIGN OUT NOTE        ED COURSE AND MEDICAL DECISION MAKING  Patient was signed out to me by Dr Mina Lewis at 3:00 PM.    In brief, Taurus Cotto is a 52 year old male who initially presented for evaluation of jaw pain and dizziness. Patient reports that he had onset of new jaw pain, shortness of breath, and lightheadedness. He rates the jaw pain as a 2/10. Patient endorses \"a little bit\" of chest pain, which he rates as a 1/10, and is no different from his baseline. Patient states that he took 81 mg of Asprin at home today.      Patient denies current tobacco, drug, or alcohol use.     Patient denies calf pain. He does not identify any waxing or waning symptoms otherwise, exacerbating or alleviating features, associated symptoms except as mentioned. He denies any pain related complaints.     Per chart review, patient had a coronary angiogram on 10/17/22 (~2 months ago).       At time of sign out, disposition was pending troponin results.    FINAL IMPRESSION    No diagnosis found.    ED MEDS  Medications   nitroGLYcerin (NITROSTAT) sublingual tablet 0.4 mg (0.4 mg Sublingual Given 12/24/22 1348)   aspirin (ASA) chewable tablet 324 mg (324 mg Oral Not Given 12/24/22 1345)   aspirin (ASA) chewable tablet 243 mg (243 mg Oral Given 12/24/22 1348)       LAB  Labs Ordered and Resulted from Time of ED Arrival to Time of ED Departure   CBC WITH PLATELETS AND DIFFERENTIAL - Abnormal       Result Value    WBC Count 4.5      RBC Count 4.46      Hemoglobin 13.4      Hematocrit 39.6 (*)     MCV 89      MCH 30.0      MCHC 33.8      RDW 12.6      Platelet Count 231      % Neutrophils 49      % Lymphocytes 34      % Monocytes 12      % Eosinophils 3      % Basophils 1      % Immature Granulocytes 1      NRBCs per 100 WBC 0      Absolute Neutrophils 2.3      Absolute Lymphocytes 1.5      Absolute Monocytes 0.5      Absolute Eosinophils 0.1      Absolute Basophils 0.0      Absolute Immature Granulocytes 0.0   "    Absolute NRBCs 0.0     BASIC METABOLIC PANEL - Normal    Sodium 138      Potassium 4.3      Chloride 103      Carbon Dioxide (CO2) 24      Anion Gap 11      Urea Nitrogen 19.8      Creatinine 1.07      Calcium 9.4      Glucose 83      GFR Estimate 83     TROPONIN T, HIGH SENSITIVITY - Normal    Troponin T, High Sensitivity 9     TROPONIN T, HIGH SENSITIVITY       EKG  ***    RADIOLOGY    XR Chest Port 1 View   Final Result   IMPRESSION: Negative chest.          DISCHARGE MEDS  New Prescriptions    No medications on file       Brad Fowler M.D.  [facility] EMERGENCY DEPARTMENT  [facility] [facility] [id number] [phone]

## 2022-12-24 NOTE — ED NOTES
Pt reports occasional midsternal CP that is mild, slightly more frequent than baseline. Pressure, not sharp or throbbing. Has been lightheaded all day today, one near-syncopal episode this AM.

## 2022-12-24 NOTE — ED PROVIDER NOTES
EMERGENCY DEPARTMENT ENCOUNTER      NAME: Taurus Cotto  AGE: 52 year old male  YOB: 1970  MRN: [id number]  EVALUATION DATE & TIME: 2022 12:52 PM    PCP: Nita Ratliff    ED PROVIDER: Mina Lewis M.D.      Chief Complaint   Patient presents with     Jaw Pain     Dizziness         FINAL IMPRESSION:  1.  Acute jaw pain.  2.  Chronic CAD.      ED COURSE & MEDICAL DECISION MAKIN:35 PM I met with the patient to gather history and to perform my initial exam. We discussed plans for the ED course, including diagnostic testing and treatment. PPE worn: cloth mask, gloves, eye protection.  Patient with known coronary artery disease.  Patient admitted in October.  Angiogram was done at that time with angioplasty and stenting of the RCA.  Patient has a history of stable angina.  Chest pain is a chronic 1-2 out of 10.  What is new is jaw pain also rated 2 out of 10.  He notes associated feeling of shortness of breath and dizziness/lightheadedness.  2:30 PM.  EKG unremarkable.  Troponin is 9.  Chemistries negative.  CBC negative.  Portable chest x-ray still pending.  Tentatively, patient will be signed out to the afternoon ED physician.  Patient may require admission.  Second troponin level ordered.    Pertinent Labs & Imaging studies reviewed. (See chart for details)    52 year old male presents to the Emergency Department for evaluation of angina equivalent.    At the conclusion of the encounter I discussed the results of all of the tests and the disposition. The questions were answered. The patient or family acknowledged understanding and was agreeable with the care plan.     Medical Decision Making    History:    Supplemental history from: Documented in HPI, if applicable    External Record(s) reviewed: Inpatient computer records reviewed.    Work Up:    Chart documentation includes differential considered and any EKGs or imaging independently interpreted by provider.    In additional to work  "up documented, I considered the following work up: See chart documentation, if applicable.    External consultation:    Discussion of management with another provider: If admitted, we will discuss this with the hospitalist.    Complicating factors:    Care impacted by chronic illness: Heart Disease    Care affected by social determinants of health: N/A    Disposition considerations: Patient may possibly require admission for his new symptoms of angina.             MEDICATIONS GIVEN IN THE EMERGENCY:  Medications   nitroGLYcerin (NITROSTAT) sublingual tablet 0.4 mg (has no administration in time range)   aspirin (ASA) chewable tablet 243 mg (has no administration in time range)   aspirin (ASA) chewable tablet 324 mg (324 mg Oral Not Given 12/24/22 1345)       NEW PRESCRIPTIONS STARTED AT TODAY'S ER VISIT  New Prescriptions    No medications on file          =================================================================    HPI    Patient information was obtained from: patient    Use of : N/A         Taurus Cotto is a 52 year old male with a pertinent history of obesity, sinus bradycardia, bicuspid aortic valve, HTN, CAD, chronic stable angina, who presents to this ED by private vehicle with family for evaluation of jaw pain and dizziness. Patient reports that he had onset of Tenriism jaw pain, shortness of breath, and lightheadedness. He rates the jaw pain as a 2/10. Patient endorses \"a little bit\" of chest pain, which he rates as a 1/10, and is no different from his baseline. Patient states that he took 81 mg of Asprin at home today.     Patient denies current tobacco, drug, or alcohol use.    Patient denies calf pain. He does not identify any waxing or waning symptoms otherwise, exacerbating or alleviating features,associated symptoms except as mentioned. He denies any pain related complaints.    Per chart review, patient had a coronary angiogram on 10/17/22 (~2 months ago).     REVIEW OF SYSTEMS   Review " of Systems   Constitutional: Negative for fever.   HENT: Negative for congestion.         Positive for jaw pain   Eyes: Negative for redness.   Respiratory: Positive for shortness of breath.    Cardiovascular: Positive for chest pain (no change from baseline).   Gastrointestinal: Negative for abdominal pain.   Genitourinary: Negative for difficulty urinating.   Musculoskeletal: Negative for arthralgias and neck stiffness.   Skin: Negative for color change.   Neurological: Positive for dizziness. Negative for headaches.   Psychiatric/Behavioral: Negative for confusion.   All other systems reviewed and are negative.       PAST MEDICAL HISTORY:  Past Medical History:   Diagnosis Date     Cardiac murmur      Coronary artery disease      GERD (gastroesophageal reflux disease)      High cholesterol      Hyperlipidemia      Hypertension        PAST SURGICAL HISTORY:  Past Surgical History:   Procedure Laterality Date     CARDIAC CATHETERIZATION  07/31/2017    AUREA to distal RCA     CARDIAC CATHETERIZATION N/A 7/31/2017    Procedure: Coronary Angiogram;  Surgeon: Dandre Love MD;  Location: [facility] Cath Lab;  Service:      CORONARY STENT PLACEMENT       CV CORONARY ANGIOGRAM N/A 3/2/2021    Procedure: Coronary Angiogram;  Surgeon: Marivel Loo MD;  Location: [facility] Cardiac Cath Lab;  Service: Cardiology     CV CORONARY ANGIOGRAM N/A 10/17/2022    Procedure: Coronary Angiogram;  Surgeon: Manuel Mclain MD;  Location: [facility] CV     CV FRACTIONAL FLOW RATIO WIRE N/A 10/17/2022    Procedure: Fractional Flow Ratio Wire;  Surgeon: Manuel Mclain MD;  Location: [facility] CATH LAB CV     CV LEFT HEART CATH N/A 10/17/2022    Procedure: Left Heart Catheterization;  Surgeon: Manuel Mclain MD;  Location: [facility] CATH LAB CV     CV LEFT HEART CATHETERIZATION WITHOUT LEFT VENTRICULOGRAM Left 3/2/2021    Procedure: Left Heart Catheterization Without Left Ventriculogram;  Surgeon: Eze  MD Marivel;  Location: [facility] Cardiac Cath Lab;  Service: Cardiology     CV PCI ANGIOPLASTY N/A 10/17/2022    Procedure: Percutaneous Coronary Intervention - Angioplasty;  Surgeon: Manuel Mclain MD;  Location: [facility] CV     CV PCI STENT DRUG ELUTING N/A 10/17/2022    Procedure: Percutaneous Coronary Intervention Stent;  Surgeon: Manuel Mclain MD;  Location: [facility] CATH LAB CV     FRACTURE SURGERY       HERNIA REPAIR       OH CATH PLMT L HRT & ARTS W/NJX & ANGIO IMG S&I Left 7/31/2017    Procedure: Left Heart Catheterization Without Left Ventriculogram;  Surgeon: Dandre Love MD;  Location: [facility] Cath Lab;  Service: Cardiology     RELEASE CARPAL TUNNEL Right            CURRENT MEDICATIONS:    aspirin (ASA) 81 MG EC tablet  atorvastatin (LIPITOR) 80 MG tablet  buPROPion (WELLBUTRIN SR) 100 MG 12 hr tablet  ezetimibe (ZETIA) 10 MG tablet  metoprolol succinate ER (TOPROL XL) 25 MG 24 hr tablet  niacin ER (NIASPAN) 500 MG CR tablet  omeprazole (PRILOSEC) 20 MG capsule  polyvinyl alcohol/povidone (CLEAR EYES NATURAL TEARS OPHT)  ranolazine (RANEXA) 1000 MG TB12 12 hr tablet  sodium chloride (OCEAN) 0.65 % nasal spray  tadalafiL (CIALIS) 20 MG tablet  ticagrelor (BRILINTA) 90 MG tablet        ALLERGIES:  Allergies   Allergen Reactions     Lisinopril Cough       FAMILY HISTORY:  History reviewed. No pertinent family history.    SOCIAL HISTORY:   Social History     Socioeconomic History     Marital status:      Spouse name: None     Number of children: None     Years of education: None     Highest education level: None   Tobacco Use     Smoking status: Former     Types: Cigarettes, Cigars     Smokeless tobacco: Never     Tobacco comments:     still occasionally smokes cigars   Vaping Use     Vaping Use: Never used   Substance and Sexual Activity     Alcohol use: No     Drug use: Not Currently   No current tobacco, drugs, or alcohol.    VITALS:  /81   Pulse 52   Temp 98.1   F (36.7  C) (Oral)   Resp 16   Wt 103.4 kg (228 lb)   SpO2 97%   BMI 32.71 kg/m      PHYSICAL EXAM    Vital Signs:  /81   Pulse 52   Temp 98.1  F (36.7  C) (Oral)   Resp 16   Wt 103.4 kg (228 lb)   SpO2 97%   BMI 32.71 kg/m    General:  On entering the room  is in no apparent distress.    Neck:  Neck supple with full range of motion and nontender.    Back:  Back and spine are nontender.  No costovertebral angle tenderness.    HEENT:  Oropharynx clear with moist mucous membranes.  HEENT unremarkable.    Pulmonary:  Chest clear to auscultation without rhonchi rales or wheezing.  No pain with palpation, inspiration, or movement.  Cardiovascular:  Cardiac regular rate and rhythm without murmurs rubs or gallops.    Abdomen:  Abdomen soft nontender.  There is no rebound or guarding.    Muskuloskeletal:  he moves all 4 without any difficulty and has normal neurovascular exams.  Extremities without clubbing, cyanosis, or edema.  Legs and calves are nontender.    Neuro:  he is alert and oriented ×3 and moves all extremities symmetrically.    Psych:  Normal affect.    Skin:  Unremarkable and warm and dry.       LAB:  All pertinent labs reviewed and interpreted.  Labs Ordered and Resulted from Time of ED Arrival to Time of ED Departure   CBC WITH PLATELETS AND DIFFERENTIAL - Abnormal       Result Value    WBC Count 4.5      RBC Count 4.46      Hemoglobin 13.4      Hematocrit 39.6 (*)     MCV 89      MCH 30.0      MCHC 33.8      RDW 12.6      Platelet Count 231      % Neutrophils 49      % Lymphocytes 34      % Monocytes 12      % Eosinophils 3      % Basophils 1      % Immature Granulocytes 1      NRBCs per 100 WBC 0      Absolute Neutrophils 2.3      Absolute Lymphocytes 1.5      Absolute Monocytes 0.5      Absolute Eosinophils 0.1      Absolute Basophils 0.0      Absolute Immature Granulocytes 0.0      Absolute NRBCs 0.0     BASIC METABOLIC PANEL - Normal    Sodium 138      Potassium 4.3      Chloride 103       Carbon Dioxide (CO2) 24      Anion Gap 11      Urea Nitrogen 19.8      Creatinine 1.07      Calcium 9.4      Glucose 83      GFR Estimate 83     TROPONIN T, HIGH SENSITIVITY - Normal    Troponin T, High Sensitivity 9         RADIOLOGY:  Reviewed all pertinent imaging. Please see official radiology report.  XR Chest Port 1 View    (Results Pending)              EKG:    Sinus bradycardia 54, otherwise normal.  Very similar to previous EKG October 17, 2022.    I have independently reviewed and interpreted the EKG(s) documented above.    PROCEDURES:         I, Elise Gryler, am serving as a scribe to document services personally performed by Dr. Lewis based on my observation and the provider's statements to me. I, Mina Lewis MD attest that Elise Gryler is acting in a scribe capacity, has observed my performance of the services and has documented them in accordance with my direction.    Mina Lewis M.D.  Emergency Medicine  [facility] EMERGENCY DEPARTMENT  [address] [place] [id number] [phone]  Dept: [phone]       Mina Lewis MD  12/24/22 [id number]

## 2023-01-02 ENCOUNTER — OFFICE VISIT (OUTPATIENT)
Dept: CARDIOLOGY | Facility: CLINIC | Age: 53
End: 2023-01-02
Payer: COMMERCIAL

## 2023-01-02 VITALS
BODY MASS INDEX: 32.86 KG/M2 | DIASTOLIC BLOOD PRESSURE: 74 MMHG | HEIGHT: 70 IN | RESPIRATION RATE: 16 BRPM | HEART RATE: 60 BPM | SYSTOLIC BLOOD PRESSURE: 116 MMHG | WEIGHT: 229.5 LBS

## 2023-01-02 DIAGNOSIS — G47.33 OSA (OBSTRUCTIVE SLEEP APNEA): ICD-10-CM

## 2023-01-02 DIAGNOSIS — R07.9 CHEST PAIN, UNSPECIFIED TYPE: ICD-10-CM

## 2023-01-02 DIAGNOSIS — R06.09 DOE (DYSPNEA ON EXERTION): ICD-10-CM

## 2023-01-02 DIAGNOSIS — I20.89 CHRONIC STABLE ANGINA (H): Primary | ICD-10-CM

## 2023-01-02 DIAGNOSIS — E78.2 MIXED HYPERLIPIDEMIA: ICD-10-CM

## 2023-01-02 PROCEDURE — 99214 OFFICE O/P EST MOD 30 MIN: CPT | Performed by: INTERNAL MEDICINE

## 2023-01-02 NOTE — PATIENT INSTRUCTIONS
Stop Niaspan  We will check a fasting lipid profile tomorrow to assess the effects of this agent on your lipids.  Follow through with dental visit follow-up, and call us if your symptoms persist.  Follow-up with Dr. Albarran otherwise as scheduled  
Plan: Labs, ivf, decadron, soft tissue neck x-ray, ct, ent consult, reassess.

## 2023-01-02 NOTE — LETTER
1/2/2023    Nita Ratliff MD  [address]  [place] [address]    RE: Taurus Cotto       Dear Colleague,     I had the pleasure of seeing Taurus Cotto in the [facility].    CARDIOLOGY RAPID ACCESS CLINIC CONSULT NOTE     Assessment/Plan:   1.  Jaw pain, history of coronary artery disease.  With episodes of facial flushing and lightheadedness, concerned about side effect from Niaspan were recently started.  We will discontinue this agent, look for effect.  Check lipid profile in a.m. to look for any changes, previously scheduled for next week.  2.  Coronary artery disease status post recent right coronary intervention for borderline abnormal FFR  3.  Obstructive sleep apnea on CPAP  4.  Bicuspid aortic valve with mild stenosis and insufficiency, unlikely to be contributing significantly.    Plan follow-up as scheduled with Dr. Albarran, or call for recurrent symptoms.     History of Present Illness:     It is my pleasure to see Taurus Cotto at the [facility] RAPID ACCESS clinic for evaluation of chest pain.    Taurus Cotto is a 52 year old male with a past medical history of hypertension, mixed hyperlipidemia, bicuspid aortic valve with mild stenosis and insufficiency, and coronary atherosclerosis status post drug-eluting stent placement x2 to FFR positive RCA lesion.  He also has a history of obstructive sleep apnea on CPAP , which he currently has been able to tolerate for about 4 hours a night.    He has chronic chest pains, and with his recent intervention despite well-controlled lipids, niacin was added about a month ago to his medication regimen.  Over the last 3 weeks or so he has noticed intermittent lightheadedness and dizziness along with the development of jaw pain which can come and go.  It may be improved with decreased chewing, dental visit follow-up as scheduled.  He presented to the emergency room for evaluation where troponins were normal along with  ECG and referred here for follow-up.  Jaw discomfort is unlike what he was experiencing prior to his most recent intervention, and he has not experienced it previously.  He feels that the chest discomfort he was experiencing prior to her intervention is slightly improved after angioplasty, though still occurs at times.  He has had no GI discomfort on aspirin plus Brilinta.  The jaw discomfort is not worsened at night, he has otherwise not had any reflux symptoms.      Past Medical History:     Patient Active Problem List   Diagnosis     SAL (dyspnea on exertion)     Sinus bradycardia     Obesity (BMI 35.0-39.9 without comorbidity)     Bicuspid aortic valve     Essential hypertension     Mixed hyperlipidemia     S/P hernia surgery     Neck pain     Chronic stable angina (H)     Coronary artery disease due to calcified coronary lesion     IDA (obstructive sleep apnea)       Past Surgical History:     Past Surgical History:   Procedure Laterality Date     CARDIAC CATHETERIZATION  07/31/2017    AUREA to distal RCA     CARDIAC CATHETERIZATION N/A 7/31/2017    Procedure: Coronary Angiogram;  Surgeon: Dandre Love MD;  Location: [facility] Cath Lab;  Service:      CORONARY STENT PLACEMENT       CV CORONARY ANGIOGRAM N/A 3/2/2021    Procedure: Coronary Angiogram;  Surgeon: Marivel Loo MD;  Location: [facility] Cardiac Cath Lab;  Service: Cardiology     CV CORONARY ANGIOGRAM N/A 10/17/2022    Procedure: Coronary Angiogram;  Surgeon: Manuel Mclain MD;  Location: [facility] CV     CV FRACTIONAL FLOW RATIO WIRE N/A 10/17/2022    Procedure: Fractional Flow Ratio Wire;  Surgeon: Manuel Mclain MD;  Location: [facility] CV     CV LEFT HEART CATH N/A 10/17/2022    Procedure: Left Heart Catheterization;  Surgeon: Manuel Mclain MD;  Location: [facility] LAB CV     CV LEFT HEART CATHETERIZATION WITHOUT LEFT VENTRICULOGRAM Left 3/2/2021    Procedure: Left Heart Catheterization Without Left  Ventriculogram;  Surgeon: Marivel Loo MD;  Location: [facility] Cardiac Cath Lab;  Service: Cardiology     CV PCI ANGIOPLASTY N/A 10/17/2022    Procedure: Percutaneous Coronary Intervention - Angioplasty;  Surgeon: Manuel Mclain MD;  Location: [facility] CV     CV PCI STENT DRUG ELUTING N/A 10/17/2022    Procedure: Percutaneous Coronary Intervention Stent;  Surgeon: Manuel Mclain MD;  Location: [facility] CV     FRACTURE SURGERY       HERNIA REPAIR       MO CATH PLMT L HRT & ARTS W/NJX & ANGIO IMG S&I Left 7/31/2017    Procedure: Left Heart Catheterization Without Left Ventriculogram;  Surgeon: Dandre Love MD;  Location: [facility] Cath Lab;  Service: Cardiology     RELEASE CARPAL TUNNEL Right        Family History:   No family history on file.  Family history reviewed and is not pertinent to the chief complaint or presenting problem    Social History:    reports that he has quit smoking. His smoking use included cigarettes and cigars. He has never used smokeless tobacco. He reports that he does not currently use drugs. He reports that he does not drink alcohol.    Exercise: Walks on his treadmill regularly, barely breaking a sweat.  Feels that his activity tolerance is stable.    Sleep: Mostly restorative on CPAP for about 4 hours a night    Meds:     Current Outpatient Medications   Medication Sig Dispense Refill     aspirin (ASA) 81 MG EC tablet TAKE 1 TABLET (81 MG) BY MOUTH DAILY START TOMORROW. 90 tablet 2     atorvastatin (LIPITOR) 80 MG tablet Take 1 tablet (80 mg) by mouth daily 90 tablet 3     buPROPion (WELLBUTRIN SR) 100 MG 12 hr tablet Take 100 mg by mouth daily       ezetimibe (ZETIA) 10 MG tablet Take 10 mg by mouth every evening       metoprolol succinate ER (TOPROL XL) 25 MG 24 hr tablet Take 0.5 tablets (12.5 mg) by mouth 2 times daily 60 tablet 12     niacin ER (NIASPAN) 500 MG CR tablet Take 1 tablet (500 mg) by mouth At Bedtime 90 tablet 0     omeprazole  "(PRILOSEC) 20 MG capsule [OMEPRAZOLE (PRILOSEC) 20 MG CAPSULE] Take 1 capsule (20 mg total) by mouth at bedtime. 30 capsule 0     polyvinyl alcohol/povidone (CLEAR EYES NATURAL TEARS OPHT) [POLYVINYL ALCOHOL/POVIDONE (CLEAR EYES NATURAL TEARS OPHT)] Apply 2 drops to eye 3 (three) times a day as needed.       ranolazine (RANEXA) 1000 MG TB12 12 hr tablet Take 1 tablet (1,000 mg) by mouth 2 times daily 180 tablet 3     sodium chloride (OCEAN) 0.65 % nasal spray [SODIUM CHLORIDE (OCEAN) 0.65 % NASAL SPRAY] Apply 2 sprays into each nostril as needed for congestion.       tadalafiL (CIALIS) 20 MG tablet [TADALAFIL (CIALIS) 20 MG TABLET] Take 20 mg by mouth daily as needed.       ticagrelor (BRILINTA) 90 MG tablet Take 1 tablet (90 mg) by mouth 2 times daily Start this evening. 180 tablet 3       Allergies:   Lisinopril    Review of Systems:     Positive for shortness of breath with activity, dizziness, loss of balance.  12 point review of systems otherwise negative     Please refer above for cardiac ROS details.       Objective:      Physical Exam  104.1 kg (229 lb 8 oz)  1.778 m (5' 10\")  Body mass index is 32.93 kg/m .  /74 (BP Location: Left arm, Patient Position: Sitting, Cuff Size: Adult Regular)   Pulse 60   Resp 16   Ht 1.778 m (5' 10\")   Wt 104.1 kg (229 lb 8 oz)   BMI 32.93 kg/m          General Appearance : Awake, Alert, No acute distress  HEENT: No Scleral icterus; the mucous membranes were pink and moist.  Conjunctivae not injected  Neck:  No cervical bruits, jugular venous distention, or thyromegaly   Chest: The spine was straight. Chest wall symmetric  Lungs: Respirations unlabored; the lungs are clear to auscultation.  No wheezing   Cardiovascular:   Normal point of maximal impulse.  Auscultation reveals normal first and second heart sounds with no murmurs, rubs, or gallops.  Carotid, radial, and dorsalis pedal pulses are intact and symmetric.    Abdomen: Rounded.  No organomegaly, masses, " bruits, or tenderness. Bowels sounds are present  Extremities: No edema  Skin: No xanthelasma. Warm, Dry.  Musculoskeletal: No tenderness.  Neurologic: Alert and oriented ×3. Speech is fluent.      EKG (personally reviewed):  12/24/2022: Sinus bradycardia 54 beats per minute, otherwise normal ECG    Cardiac Imaging Studies:  Coronary intervention 10/2022:  LM:no obstruction  LAD:patent mid-vessel stent w/ ~ 10% ISR    Lcx:mid-vessel 50% narrowing at the take off of OM1 and AV Lcx. PD/PA was 1 on both branches - no adenosine given  RCA:dominant, long area of up to 70% disease throughout prox to mid-vessel. PD/PA 0.98, FFR 0.79  - single-vessel obstructive CAD, confirmed to be FFR-positive in RCA and now s/p AUREA x2; Lcx was negative by flow wire assessment   - ASA 81mg daily indefinitely, ticagrelor 180mg once, followed by 90mg twice daily for at least 12 months. Clopidogrel 600mg once, then 75mg daily, beyond one year. Would continue DAPT indefinitely    Exercise nuclear stress test 8/2022:    1.The nuclear stress test is abnormal.     2.The patient's exercise capacity is average achieving 11.1 METS on the standard Edinson protocol.     3.Inadequate negative exercise ECG for ischemia after 9-1/2 minutes on the standard Edinson protocol with the patient achieving heart rate of 140 for only 82% of age-predicted maximal heart rate, no ECG changes and no cardiovascular symptoms however significant hypertension at 233/114.     4.There is a medium sized area of a moderate degree of nontransmural infarction in the entire inferior and inferolateral segment(s) of the left ventricle.  No ischemia seen.     5.The left ventricular ejection fraction at stress is 57%.     A prior study was conducted on 8/31/2021.  This study has no change when compared with the prior study.        Lab Review   Lab Results   Component Value Date     12/24/2022    CO2 24 12/24/2022    CO2 25 03/11/2022    BUN 19.8 12/24/2022    BUN 25 03/11/2022      Lab Results   Component Value Date    WBC 4.5 12/24/2022    HGB 13.4 12/24/2022    HCT 39.6 12/24/2022    MCV 89 12/24/2022     12/24/2022     Lab Results   Component Value Date    CHOL 95 10/17/2022    TRIG 79 10/17/2022    HDL 37 10/17/2022     Lab Results   Component Value Date    TROPONINI <0.01 06/17/2019     Lab Results   Component Value Date    BNP 21 06/16/2019     Lab Results   Component Value Date    TSH 2.64 06/14/2018       Carlos Dorantes MD, MD [facility]      This note created using Dragon voice recognition software. Sound alike errors may have escaped editing.     Thank you for allowing me to participate in the care of your patient.      Sincerely,     Carlos Dorantes MD     [facility]  cc:   Brad Fowler MD  [address] [place] [address]

## 2023-01-02 NOTE — PROGRESS NOTES
CARDIOLOGY RAPID ACCESS CLINIC CONSULT NOTE     Assessment/Plan:   1.  Jaw pain, history of coronary artery disease.  With episodes of facial flushing and lightheadedness, concerned about side effect from Niaspan were recently started.  We will discontinue this agent, look for effect.  Check lipid profile in a.m. to look for any changes, previously scheduled for next week.  2.  Coronary artery disease status post recent right coronary intervention for borderline abnormal FFR  3.  Obstructive sleep apnea on CPAP  4.  Bicuspid aortic valve with mild stenosis and insufficiency, unlikely to be contributing significantly.    Plan follow-up as scheduled with Dr. Albarran, or call for recurrent symptoms.     History of Present Illness:     It is my pleasure to see Taurus Cotto at the [facility] RAPID ACCESS clinic for evaluation of chest pain.    aTurus Cotto is a 52 year old male with a past medical history of hypertension, mixed hyperlipidemia, bicuspid aortic valve with mild stenosis and insufficiency, and coronary atherosclerosis status post drug-eluting stent placement x2 to FFR positive RCA lesion.  He also has a history of obstructive sleep apnea on CPAP , which he currently has been able to tolerate for about 4 hours a night.    He has chronic chest pains, and with his recent intervention despite well-controlled lipids, niacin was added about a month ago to his medication regimen.  Over the last 3 weeks or so he has noticed intermittent lightheadedness and dizziness along with the development of jaw pain which can come and go.  It may be improved with decreased chewing, dental visit follow-up as scheduled.  He presented to the emergency room for evaluation where troponins were normal along with ECG and referred here for follow-up.  Jaw discomfort is unlike what he was experiencing prior to his most recent intervention, and he has not experienced it previously.  He feels that the chest  discomfort he was experiencing prior to her intervention is slightly improved after angioplasty, though still occurs at times.  He has had no GI discomfort on aspirin plus Brilinta.  The jaw discomfort is not worsened at night, he has otherwise not had any reflux symptoms.      Past Medical History:     Patient Active Problem List   Diagnosis     SAL (dyspnea on exertion)     Sinus bradycardia     Obesity (BMI 35.0-39.9 without comorbidity)     Bicuspid aortic valve     Essential hypertension     Mixed hyperlipidemia     S/P hernia surgery     Neck pain     Chronic stable angina (H)     Coronary artery disease due to calcified coronary lesion     IDA (obstructive sleep apnea)       Past Surgical History:     Past Surgical History:   Procedure Laterality Date     CARDIAC CATHETERIZATION  07/31/2017    AUREA to distal RCA     CARDIAC CATHETERIZATION N/A 7/31/2017    Procedure: Coronary Angiogram;  Surgeon: Dandre Love MD;  Location: [facility] Cath Lab;  Service:      CORONARY STENT PLACEMENT       CV CORONARY ANGIOGRAM N/A 3/2/2021    Procedure: Coronary Angiogram;  Surgeon: Marivel Loo MD;  Location: [facility] Cardiac Cath Lab;  Service: Cardiology     CV CORONARY ANGIOGRAM N/A 10/17/2022    Procedure: Coronary Angiogram;  Surgeon: Manuel Mclain MD;  Location: [facility] CATH LAB CV     CV FRACTIONAL FLOW RATIO WIRE N/A 10/17/2022    Procedure: Fractional Flow Ratio Wire;  Surgeon: Manuel Mclain MD;  Location: [facility] CATH LAB CV     CV LEFT HEART CATH N/A 10/17/2022    Procedure: Left Heart Catheterization;  Surgeon: Manuel Mclain MD;  Location: [facility] CATH LAB CV     CV LEFT HEART CATHETERIZATION WITHOUT LEFT VENTRICULOGRAM Left 3/2/2021    Procedure: Left Heart Catheterization Without Left Ventriculogram;  Surgeon: Marivel Loo MD;  Location: [facility] Cardiac Cath Lab;  Service: Cardiology     CV PCI ANGIOPLASTY N/A 10/17/2022    Procedure: Percutaneous Coronary Intervention -  Angioplasty;  Surgeon: Manuel Mclain MD;  Location: [facility] CV     CV PCI STENT DRUG ELUTING N/A 10/17/2022    Procedure: Percutaneous Coronary Intervention Stent;  Surgeon: Manuel Mclain MD;  Location: [facility] CV     FRACTURE SURGERY       HERNIA REPAIR       SC CATH PLMT L HRT & ARTS W/NJX & ANGIO IMG S&I Left 7/31/2017    Procedure: Left Heart Catheterization Without Left Ventriculogram;  Surgeon: Dandre Love MD;  Location: [facility] Cath Lab;  Service: Cardiology     RELEASE CARPAL TUNNEL Right        Family History:   No family history on file.  Family history reviewed and is not pertinent to the chief complaint or presenting problem    Social History:    reports that he has quit smoking. His smoking use included cigarettes and cigars. He has never used smokeless tobacco. He reports that he does not currently use drugs. He reports that he does not drink alcohol.    Exercise: Walks on his treadmill regularly, barely breaking a sweat.  Feels that his activity tolerance is stable.    Sleep: Mostly restorative on CPAP for about 4 hours a night    Meds:     Current Outpatient Medications   Medication Sig Dispense Refill     aspirin (ASA) 81 MG EC tablet TAKE 1 TABLET (81 MG) BY MOUTH DAILY START TOMORROW. 90 tablet 2     atorvastatin (LIPITOR) 80 MG tablet Take 1 tablet (80 mg) by mouth daily 90 tablet 3     buPROPion (WELLBUTRIN SR) 100 MG 12 hr tablet Take 100 mg by mouth daily       ezetimibe (ZETIA) 10 MG tablet Take 10 mg by mouth every evening       metoprolol succinate ER (TOPROL XL) 25 MG 24 hr tablet Take 0.5 tablets (12.5 mg) by mouth 2 times daily 60 tablet 12     niacin ER (NIASPAN) 500 MG CR tablet Take 1 tablet (500 mg) by mouth At Bedtime 90 tablet 0     omeprazole (PRILOSEC) 20 MG capsule [OMEPRAZOLE (PRILOSEC) 20 MG CAPSULE] Take 1 capsule (20 mg total) by mouth at bedtime. 30 capsule 0     polyvinyl alcohol/povidone (CLEAR EYES NATURAL TEARS OPHT)  "[POLYVINYL ALCOHOL/POVIDONE (CLEAR EYES NATURAL TEARS OPHT)] Apply 2 drops to eye 3 (three) times a day as needed.       ranolazine (RANEXA) 1000 MG TB12 12 hr tablet Take 1 tablet (1,000 mg) by mouth 2 times daily 180 tablet 3     sodium chloride (OCEAN) 0.65 % nasal spray [SODIUM CHLORIDE (OCEAN) 0.65 % NASAL SPRAY] Apply 2 sprays into each nostril as needed for congestion.       tadalafiL (CIALIS) 20 MG tablet [TADALAFIL (CIALIS) 20 MG TABLET] Take 20 mg by mouth daily as needed.       ticagrelor (BRILINTA) 90 MG tablet Take 1 tablet (90 mg) by mouth 2 times daily Start this evening. 180 tablet 3       Allergies:   Lisinopril    Review of Systems:     Positive for shortness of breath with activity, dizziness, loss of balance.  12 point review of systems otherwise negative     Please refer above for cardiac ROS details.       Objective:      Physical Exam  104.1 kg (229 lb 8 oz)  1.778 m (5' 10\")  Body mass index is 32.93 kg/m .  /74 (BP Location: Left arm, Patient Position: Sitting, Cuff Size: Adult Regular)   Pulse 60   Resp 16   Ht 1.778 m (5' 10\")   Wt 104.1 kg (229 lb 8 oz)   BMI 32.93 kg/m          General Appearance : Awake, Alert, No acute distress  HEENT: No Scleral icterus; the mucous membranes were pink and moist.  Conjunctivae not injected  Neck:  No cervical bruits, jugular venous distention, or thyromegaly   Chest: The spine was straight. Chest wall symmetric  Lungs: Respirations unlabored; the lungs are clear to auscultation.  No wheezing   Cardiovascular:   Normal point of maximal impulse.  Auscultation reveals normal first and second heart sounds with no murmurs, rubs, or gallops.  Carotid, radial, and dorsalis pedal pulses are intact and symmetric.    Abdomen: Rounded.  No organomegaly, masses, bruits, or tenderness. Bowels sounds are present  Extremities: No edema  Skin: No xanthelasma. Warm, Dry.  Musculoskeletal: No tenderness.  Neurologic: Alert and oriented ×3. Speech is " fluent.      EKG (personally reviewed):  12/24/2022: Sinus bradycardia 54 beats per minute, otherwise normal ECG    Cardiac Imaging Studies:  Coronary intervention 10/2022:  LM:no obstruction  LAD:patent mid-vessel stent w/ ~ 10% ISR    Lcx:mid-vessel 50% narrowing at the take off of OM1 and AV Lcx. PD/PA was 1 on both branches - no adenosine given  RCA:dominant, long area of up to 70% disease throughout prox to mid-vessel. PD/PA 0.98, FFR 0.79  - single-vessel obstructive CAD, confirmed to be FFR-positive in RCA and now s/p AUREA x2; Lcx was negative by flow wire assessment   - ASA 81mg daily indefinitely, ticagrelor 180mg once, followed by 90mg twice daily for at least 12 months. Clopidogrel 600mg once, then 75mg daily, beyond one year. Would continue DAPT indefinitely    Exercise nuclear stress test 8/2022:    1.The nuclear stress test is abnormal.     2.The patient's exercise capacity is average achieving 11.1 METS on the standard Edinson protocol.     3.Inadequate negative exercise ECG for ischemia after 9-1/2 minutes on the standard Edinson protocol with the patient achieving heart rate of 140 for only 82% of age-predicted maximal heart rate, no ECG changes and no cardiovascular symptoms however significant hypertension at 233/114.     4.There is a medium sized area of a moderate degree of nontransmural infarction in the entire inferior and inferolateral segment(s) of the left ventricle.  No ischemia seen.     5.The left ventricular ejection fraction at stress is 57%.     A prior study was conducted on 8/31/2021.  This study has no change when compared with the prior study.        Lab Review   Lab Results   Component Value Date     12/24/2022    CO2 24 12/24/2022    CO2 25 03/11/2022    BUN 19.8 12/24/2022    BUN 25 03/11/2022     Lab Results   Component Value Date    WBC 4.5 12/24/2022    HGB 13.4 12/24/2022    HCT 39.6 12/24/2022    MCV 89 12/24/2022     12/24/2022     Lab Results   Component Value  Date    CHOL 95 10/17/2022    TRIG 79 10/17/2022    HDL 37 10/17/2022     Lab Results   Component Value Date    TROPONINI <0.01 06/17/2019     Lab Results   Component Value Date    BNP 21 06/16/2019     Lab Results   Component Value Date    TSH 2.64 06/14/2018       Carlos Dorantes MD, MD [facility]      This note created using Dragon voice recognition software. Sound alike errors may have escaped editing.

## 2023-03-06 ENCOUNTER — TELEPHONE (OUTPATIENT)
Dept: CARDIOLOGY | Facility: CLINIC | Age: 53
End: 2023-03-06
Payer: COMMERCIAL

## 2023-03-06 DIAGNOSIS — I25.10 CORONARY ARTERY DISEASE DUE TO CALCIFIED CORONARY LESION: ICD-10-CM

## 2023-03-06 DIAGNOSIS — Q23.81 BICUSPID AORTIC VALVE: ICD-10-CM

## 2023-03-06 DIAGNOSIS — R07.9 CHEST PAIN, UNSPECIFIED TYPE: Primary | ICD-10-CM

## 2023-03-06 DIAGNOSIS — I25.84 CORONARY ARTERY DISEASE DUE TO CALCIFIED CORONARY LESION: ICD-10-CM

## 2023-03-06 NOTE — TELEPHONE ENCOUNTER
x2 messages left to check in with patient. Direct line provided. -[facility]

## 2023-03-06 NOTE — TELEPHONE ENCOUNTER
Received a call back from Alec. He states that he is feeling better somewhat today. He presented to the  in [place] - records of this are in care everywhere. BP was 130/81.    He reports that on Thursday of last week, he woke up after a poor night of sleeping. He felt just off and like his gait was unsteady. He also felt fatigued but he attributed this to poor sleep. He noticed his blood pressure was elevated that day 160/110.He denied any shortness of breath or chest pains. He denied palpitations but sometimes he did feel a little dizzy which lead to some nausea. He had only had coffee and his medications that morning, so he ate and as the day, his symptoms subsided.    Over the weekend, he noticed that he just continued to feel a little out of it and like his balance was off. His wife convinced him to call our answering service and Dr. Joya recommended urgent evaluation.     Head and Neck CTA results in epic-no infarct or other acute finding but report mentions left vocal corn paralysis but he has normal speech. They discharged him without changes as BP was normal and he is already on dual antiplatelet therapy. Cardiac labs + EKG WNL.     Writer strongly stated that he needs to follow up with Dr. Ratliff regarding head MRI and follow up for neurological symptoms. Will route this to Dr. Albarran for recommendations related to his cardiac hx.             Dr. Albarran,   See urgency room notes in care everywhere. He did go as Dr. Joya recommended. I recommended strongly that he see his PMD and follow up on his neuro symptoms and discuss MRI as recommended by Urgency room. He said he would call right as we got off the phone. His main complaint was feeling off and off-balance/lightheaded. His symptoms sound suspicious for TIA. Any recommendations from a cardiac standpoint after review of records?  Thanks,  Ed

## 2023-03-06 NOTE — TELEPHONE ENCOUNTER
----- Message -----  From: Masoud Albarran MD  Sent: 3/6/2023   8:28 AM CST  To: Zulma Parham RN    Anyway we can get a blood pressure and pulse check today or tomorrow?  In addition would like the MRI from a moved up to maybe now.  LF

## 2023-03-06 NOTE — TELEPHONE ENCOUNTER
"ED  3/5/2023  [facility] [place]  Encounter Summary      Taurus Cotto \"FELIPE\" - 52 y.o. Male; born [birth year][facility], generated on Mar. 06, 2023March 06, 2023   Reason for Visit    Reason for Visit -   Reason Comments   Dizzy       Encounter Details    Encounter Details  Date Type Department Care Team Description   03/05/2023 4:20 PM CST - 03/05/2023 7:50 PM CST Emergency [facility] [place]    [address]    [place] [address]    [phone]   Balta Yancey PA    [address] [place] [address]    [phone] (Work)    [phone] (Fax)   Disequilibrium (Primary Dx);   History of coronary artery disease  Discharge Disposition: Home Self Care     Social History  - documented as of this encounter  Social History  Tobacco Use Types Packs/Day Years Used Date   Smoking Tobacco: Former Cigarettes 1 17 01/01/1991 - 01/01/2008   Smokeless Tobacco: Never             Social History  Tobacco Cessation: Counseling Given: Not Answered     Social History  Alcohol Use Standard Drinks/Week Comments   No 0 (1 standard drink = 0.6 oz pure alcohol)       Social History  Sex and Gender Information Value Date Recorded   Sex Assigned at Birth Not on file     Gender Identity Not on file     Sexual Orientation Not on file       Last Filed Vital Signs  - documented in this encounter  Last Filed Vital Signs  Vital Sign Reading Time Taken Comments   Blood Pressure 130/81 03/05/2023 4:43 PM CST     Pulse 53 03/05/2023 4:43 PM CST     Temperature 36.5  C (97.7  F) 03/05/2023 4:43 PM CST     Respiratory Rate 14 03/05/2023 4:43 PM CST     Oxygen Saturation 94% 03/05/2023 4:43 PM CST     Inhaled Oxygen Concentration - -     Weight 99.8 kg (220 lb) 03/05/2023 4:43 PM CST     Height 177.8 cm (5' 10\") 03/05/2023 4:43 PM CST     Body Mass Index 31.57 03/05/2023 4:43 PM CST       Discharge Instructions  - documented in this encounter  Discharge Instructions  Cross, " PAULY Alcantar - 03/05/2023 7:30 PM CST    Formatting of this note might be different from the original.  Seen today in the emergency room for evaluation of disequilibrium for the last 4 days. Symptoms are very concerning for possible cardiac or stroke like cause of symptoms. Given the length of time since symptoms CT imaging is not perfect at identifying potential strokes however today given negative CT CTA imaging of the head and neck this is reassuring that there is no acute clot or bleeding. The remainder of your labs and work-up today are also reassuring with negative heart enzyme, appropriate blood counts, and appropriate EKG. Because of this it is reasonable to follow-up in outpatient clinic early this week to discuss options and facilitate outpatient follow-up with neurology to obtain MRI imaging and further evaluation of potential cause of symptoms. Given the fact that you are already on antiplatelet therapy and aspirin do not know that there is any additional benefit of adding medications at this time. If you develop any worsening disequilibrium, vision changes, unilateral numbness tingling or weakness, chest pain, shortness of breath, speech difficulty go directly to an emergency department promptly for repeat evaluation.  Electronically signed by Balta Yancey PA at 03/05/2023 7:30 PM CST

## 2023-03-06 NOTE — TELEPHONE ENCOUNTER
----- Message from Brett Joya MD sent at 3/5/2023  9:51 AM CST -----  Patient called with some concerns regarding relatively mild balance issues and headache and some higher blood pressure earlier in the week although improved now.  Given ongoing symptoms and being on aspirin and Plavix I recommended that he go to be evaluated either in the emergency room or the ER.  Thanks.  Can we please follow-up KARLIE Joya

## 2023-03-07 NOTE — TELEPHONE ENCOUNTER
Masoud Albarran MD Caswell, Mallory J RN  Caller: Unspecified (Yesterday,  9:53 AM)  I reviewed his records.   His blood pressure was actually 130/80 in the emergency department however his pulse was 45.   I agree with neurological as well as primary evaluation however I am concerned with the bradycardia and him feeling slightly off.   Can we confirm what meds he is taking and what his blood pressures and pulses are at home?   In addition, there is a question that his valve is actually truly trileaflet rather than bicuspid, can we arrange for cardiac MRI and make that a cardiac MRI stress?   Following that I will be in touch with further recommendations.   LF           ==LM to discuss and clarify medication list. Cardiac MRI stress ordered. -demetri    Right arm;

## 2023-03-08 NOTE — TELEPHONE ENCOUNTER
Left another message to discuss;cardiac stress mri was ordered and it was left in voicemail that patient can expect a call from schedulers to arrange. Direct line left for call back. -[facility]

## 2023-03-23 ENCOUNTER — HOSPITAL ENCOUNTER (OUTPATIENT)
Dept: MRI IMAGING | Facility: HOSPITAL | Age: 53
Discharge: HOME | End: 2023-03-23
Attending: FAMILY MEDICINE | Admitting: FAMILY MEDICINE
Payer: COMMERCIAL

## 2023-03-23 DIAGNOSIS — R42 DISEQUILIBRIUM: ICD-10-CM

## 2023-03-23 PROCEDURE — 70551 MRI BRAIN STEM W/O DYE: CPT

## 2023-03-27 DIAGNOSIS — I20.89 CHRONIC STABLE ANGINA (H): ICD-10-CM

## 2023-03-28 RX ORDER — RANOLAZINE 1000 MG/1
TABLET, EXTENDED RELEASE ORAL
Qty: 180 TABLET | Refills: 3 | Status: SHIPPED | OUTPATIENT
Start: 2023-03-28 | End: 2023-03-30

## 2023-03-29 ENCOUNTER — TELEPHONE (OUTPATIENT)
Dept: CARDIOLOGY | Facility: CLINIC | Age: 53
End: 2023-03-29
Payer: COMMERCIAL

## 2023-03-29 DIAGNOSIS — I20.89 CHRONIC STABLE ANGINA (H): ICD-10-CM

## 2023-03-29 DIAGNOSIS — N52.9 ERECTILE DYSFUNCTION, UNSPECIFIED ERECTILE DYSFUNCTION TYPE: Primary | ICD-10-CM

## 2023-03-29 NOTE — TELEPHONE ENCOUNTER
[facility] Call Center    Phone Message    May a detailed message be left on voicemail: yes     Reason for Call: Medication Refill Request    Has the patient contacted the pharmacy for the refill? Yes   Name of medication being requested: ranolazine (RANEXA) 1000 MG TB12 12 hr tablet - pt wants 3 months worth on this fill due to cost difference   tadalafiL (CIALIS) 20 MG tablet - pt wants just 1 month fill on this med  Provider who prescribed the medication: Dr. Albarran  Pharmacy: [facility] [place] [id number].  Date medication is needed: 4.5.23      Action Taken: Message routed to:  [facility] cardio    Travel Screening: Not Applicable     Thank you!  [facility]

## 2023-03-30 RX ORDER — RANOLAZINE 1000 MG/1
1000 TABLET, EXTENDED RELEASE ORAL 2 TIMES DAILY
Qty: 180 TABLET | Refills: 3 | Status: SHIPPED | OUTPATIENT
Start: 2023-03-30 | End: 2024-04-23

## 2023-03-30 NOTE — TELEPHONE ENCOUNTER
Ranexa refilled. Msg to [facility] regarding refill authorization of Tadalafil 20 mg prn. -[facility]        Alec An is requesting refill of Cialis 20 mg as needed tablet. It shows up as a historical medication. Would you authorize refill under your name?  Thanks,  Mal

## 2023-03-31 RX ORDER — TADALAFIL 20 MG/1
20 TABLET ORAL DAILY PRN
Qty: 30 TABLET | Refills: 0 | Status: SHIPPED | OUTPATIENT
Start: 2023-03-31

## 2023-03-31 NOTE — TELEPHONE ENCOUNTER
Refill granted for Cialis 20 mg, which is the max dose of this type of ED medication. (Viagra comes in higher doses such as 50 mg) Pt requested 30 tablets, which was sent. Further refills can go to PMD team. -[facility] [place]

## 2023-03-31 NOTE — TELEPHONE ENCOUNTER
----- Message -----  From: Masoud Albarran MD  Sent: 3/30/2023   5:02 PM CDT  To: Zulma Parham, RN    20 mg is the dose we use for pulmonary hypertension, if it works for him, let him go ahead and renew it please.  Once again 20 pills, maybe 3 refills.  LF  ----- Message -----  From: Zulma Parham RN  Sent: 3/30/2023   4:16 PM CDT  To: Masoud Albarran MD    Hi,  It is a refill. The dose requested is 20 mg. Did you want it to be increased?   Mal  ----- Message -----  From: Masoud Albarran MD  Sent: 3/30/2023   3:21 PM CDT  To: Zulma Parham RN    If he is unable to get this from his primary, we can prior authorize maybe 20 pills with 3 refills.  Probably 50 mg is enough.  LF

## 2023-04-10 ENCOUNTER — OFFICE VISIT (OUTPATIENT)
Dept: CARDIOLOGY | Facility: CLINIC | Age: 53
End: 2023-04-10
Payer: COMMERCIAL

## 2023-04-10 VITALS
WEIGHT: 226 LBS | SYSTOLIC BLOOD PRESSURE: 120 MMHG | HEART RATE: 56 BPM | BODY MASS INDEX: 32.35 KG/M2 | RESPIRATION RATE: 16 BRPM | HEIGHT: 70 IN | DIASTOLIC BLOOD PRESSURE: 79 MMHG

## 2023-04-10 DIAGNOSIS — I25.83 CORONARY ARTERIOSCLEROSIS DUE TO LIPID RICH PLAQUE: Primary | ICD-10-CM

## 2023-04-10 DIAGNOSIS — I25.10 CORONARY ARTERY DISEASE DUE TO CALCIFIED CORONARY LESION: ICD-10-CM

## 2023-04-10 DIAGNOSIS — I25.84 CORONARY ARTERY DISEASE DUE TO CALCIFIED CORONARY LESION: ICD-10-CM

## 2023-04-10 DIAGNOSIS — Q23.81 BICUSPID AORTIC VALVE: ICD-10-CM

## 2023-04-10 DIAGNOSIS — G47.33 OSA (OBSTRUCTIVE SLEEP APNEA): ICD-10-CM

## 2023-04-10 DIAGNOSIS — E66.9 OBESITY (BMI 35.0-39.9 WITHOUT COMORBIDITY): ICD-10-CM

## 2023-04-10 DIAGNOSIS — I10 ESSENTIAL HYPERTENSION: ICD-10-CM

## 2023-04-10 DIAGNOSIS — E78.2 MIXED HYPERLIPIDEMIA: ICD-10-CM

## 2023-04-10 PROCEDURE — 99214 OFFICE O/P EST MOD 30 MIN: CPT | Performed by: INTERNAL MEDICINE

## 2023-04-10 RX ORDER — LANOLIN ALCOHOL/MO/W.PET/CERES
CREAM (GRAM) TOPICAL PRN
COMMUNITY

## 2023-04-10 NOTE — LETTER
4/10/2023    Nita Ratliff MD  [address]  [place] [address]    RE: Taurus Cotto       Dear Colleague,     I had the pleasure of seeing Taurus Cotto in the [facility].      [facility]  Heart Care Clinic Follow-up Note    Assessment & Plan        (I25.10,  I25.83) Coronary arteriosclerosis due to lipid rich plaque  (primary encounter diagnosis)  Comment: Given increased chest discomfort he underwent angiography October 17, 2022 showing normal left main, mid LAD in-stent 10% restenosis, circumflex with mid 50% stenosis and the right coronary artery with a 70% proximal to mid stenosis that was significant by FFR and received Synergy AUREA 3.5 x 38 mm and 3.5 x 12 mm stents.  Currently vague jaw discomfort which sounds atypical, thus arrange for stress MRI which give us a good look at his aorta and bicuspid valve.  And work on prevention.  That would include possibly adding ACE inhibitor with lower dose of metoprolol a year out from event, losing weight, chronic Plavix since he has had recurrent issues in place of aspirin.    (Q23.1) Bicuspid aortic valve  Comment: Based on echo from January 2020 dimensionless index 0.5 with a valve area 1.6 cm , mean gradient of 9 mmHg, peak gradient of 16 mmHg mean velocity 1.4 m/s.  Most recent echo however read as tricuspid with aortic insufficiency +1, dimensionless index 0.6 with mean gradient of 10 and peak gradient of 16 with mean velocity 2.0 m/s.  We will get stress MRI, that we can better evaluate for trileaflet bileaflet valve, aortic dimension, as well as ischemia.  He is on Ranexa as an antianginal with good QT interval.    (I10) Essential hypertension  Comment: Under good control currently on metoprolol.    (E78.2) Mixed hyperlipidemia  Comment: Excellent total cholesterol of 95 with an LDL of 42.  He also has elevated LP(a) of 75.  Given this we will have him discontinue Zetia, recheck fasting lipids in about 2 to 3 months.  We will  also arrange for possible consideration for LP(a) studies.    (E66.9) Obesity (BMI 35.0-39.9 without comorbidity)  Comment: He has lost weight, not using CPAP.    (G47.33) IDA (obstructive sleep apnea)  Comment: Never was really tolerant of CPAP.  Might consider retesting now that he has lost weight.    Plan  1.  Continue work on weight loss.  2.  Consider for our LP(a) studies.  3.  Consider repeat sleep study but defer to primary.  4.  Stress MRI given strange jaw discomfort, will also evaluate for bicuspid valve or aortic root enlargement.  5.  Follow-up with me in October which will be a year out from his most recent event.  At that time switch ticagrelor over to Plavix.    Subjective  CC: 52-year-old white gentleman here for a follow-up visit.  Since I saw him around [facility] he had some jaw discomfort and lightheadedness and presented to the emergency department.  Work-up was unremarkable.  Since then he has jaw discomfort, he states when he moves his jaw 1 way or the other he gets this, it is not effort related.  He has no effort related angina, palpitations, shortness of breath, PND, orthopnea, syncope, dizziness or peripheral edema.  He does have occasional days where he gets lightheaded, especially jumps out of bed early, he also has noted some days where his blood pressure is elevated.    Medications  Current Outpatient Medications   Medication Sig Dispense Refill    aspirin (ASA) 81 MG EC tablet TAKE 1 TABLET (81 MG) BY MOUTH DAILY START TOMORROW. 90 tablet 2    atorvastatin (LIPITOR) 80 MG tablet Take 1 tablet (80 mg) by mouth daily 90 tablet 3    buPROPion (WELLBUTRIN SR) 100 MG 12 hr tablet Take 100 mg by mouth daily      ezetimibe (ZETIA) 10 MG tablet Take 10 mg by mouth every evening      melatonin 3 MG tablet as needed      metoprolol succinate ER (TOPROL XL) 25 MG 24 hr tablet TAKE 0.5 TABLETS BY MOUTH 2 TIMES DAILY. 90 tablet 3    omeprazole (PRILOSEC) 20 MG capsule [OMEPRAZOLE (PRILOSEC) 20  "MG CAPSULE] Take 1 capsule (20 mg total) by mouth at bedtime. 30 capsule 0    polyvinyl alcohol/povidone (CLEAR EYES NATURAL TEARS OPHT) [POLYVINYL ALCOHOL/POVIDONE (CLEAR EYES NATURAL TEARS OPHT)] Apply 2 drops to eye 3 (three) times a day as needed.      ranolazine (RANEXA) 1000 MG TB12 12 hr tablet Take 1 tablet (1,000 mg) by mouth 2 times daily 180 tablet 3    sodium chloride (OCEAN) 0.65 % nasal spray [SODIUM CHLORIDE (OCEAN) 0.65 % NASAL SPRAY] Apply 2 sprays into each nostril as needed for congestion.      tadalafil (CIALIS) 20 MG tablet Take 1 tablet (20 mg) by mouth daily as needed 30 tablet 0    ticagrelor (BRILINTA) 90 MG tablet Take 1 tablet (90 mg) by mouth 2 times daily Start this evening. 180 tablet 3       Objective  /79 (BP Location: Right arm, Patient Position: Sitting, Cuff Size: Adult Large)   Pulse 56   Resp 16   Ht 1.778 m (5' 10\")   Wt 102.5 kg (226 lb)   BMI 32.43 kg/m      General Appearance:    Alert, cooperative, no distress, appears stated age   Head:    Normocephalic, without obvious abnormality, atraumatic   Throat:   Lips, mucosa, and tongue normal; teeth and gums normal   Neck:   Supple, symmetrical, trachea midline, no adenopathy;        thyroid:  No enlargement/tenderness/nodules; no carotid    bruit or JVD   Back:     Symmetric, no curvature, ROM normal, no CVA tenderness   Lungs:     Clear to auscultation bilaterally, respirations unlabored   Chest wall:    No tenderness or deformity   Heart:    Regular rate and rhythm, S1 and S2 normal, no murmur, rub   or gallop   Abdomen:     Soft, non-tender, bowel sounds active all four quadrants,     no masses, no organomegaly   Extremities:   Normal, atraumatic, no cyanosis or edema   Pulses:   2+ and symmetric all extremities   Skin:   Skin color, texture, turgor normal, no rashes or lesions     Results    Lab Results personally reviewed   Lab Results   Component Value Date    CHOL 95 10/17/2022    CHOL 110 03/11/2022     Lab " Results   Component Value Date    HDL 37 (L) 10/17/2022    HDL 36 (L) 03/11/2022     No components found for: LDLCALC  Lab Results   Component Value Date    TRIG 79 10/17/2022    TRIG 86 03/11/2022     Lab Results   Component Value Date    WBC 4.5 12/24/2022    HGB 13.4 12/24/2022    HCT 39.6 (L) 12/24/2022     12/24/2022     Lab Results   Component Value Date    BUN 19.8 12/24/2022     12/24/2022    CO2 24 12/24/2022               Thank you for allowing me to participate in the care of your patient.      Sincerely,     WILLAM MARTÍNEZ MD     [facility]  cc:   No referring provider defined for this encounter.

## 2023-04-10 NOTE — PROGRESS NOTES
[facility]  Heart Care Clinic Follow-up Note    Assessment & Plan        (I25.10,  I25.83) Coronary arteriosclerosis due to lipid rich plaque  (primary encounter diagnosis)  Comment: Given increased chest discomfort he underwent angiography October 17, 2022 showing normal left main, mid LAD in-stent 10% restenosis, circumflex with mid 50% stenosis and the right coronary artery with a 70% proximal to mid stenosis that was significant by FFR and received Synergy AUREA 3.5 x 38 mm and 3.5 x 12 mm stents.  Currently vague jaw discomfort which sounds atypical, thus arrange for stress MRI which give us a good look at his aorta and bicuspid valve.  And work on prevention.  That would include possibly adding ACE inhibitor with lower dose of metoprolol a year out from event, losing weight, chronic Plavix since he has had recurrent issues in place of aspirin.    (Q23.1) Bicuspid aortic valve  Comment: Based on echo from January 2020 dimensionless index 0.5 with a valve area 1.6 cm , mean gradient of 9 mmHg, peak gradient of 16 mmHg mean velocity 1.4 m/s.  Most recent echo however read as tricuspid with aortic insufficiency +1, dimensionless index 0.6 with mean gradient of 10 and peak gradient of 16 with mean velocity 2.0 m/s.  We will get stress MRI, that we can better evaluate for trileaflet bileaflet valve, aortic dimension, as well as ischemia.  He is on Ranexa as an antianginal with good QT interval.    (I10) Essential hypertension  Comment: Under good control currently on metoprolol.    (E78.2) Mixed hyperlipidemia  Comment: Excellent total cholesterol of 95 with an LDL of 42.  He also has elevated LP(a) of 75.  Given this we will have him discontinue Zetia, recheck fasting lipids in about 2 to 3 months.  We will also arrange for possible consideration for LP(a) studies.    (E66.9) Obesity (BMI 35.0-39.9 without comorbidity)  Comment: He has lost weight, not using CPAP.    (G47.33) IDA (obstructive sleep  apnea)  Comment: Never was really tolerant of CPAP.  Might consider retesting now that he has lost weight.    Plan  1.  Continue work on weight loss.  2.  Consider for our LP(a) studies.  3.  Consider repeat sleep study but defer to primary.  4.  Stress MRI given strange jaw discomfort, will also evaluate for bicuspid valve or aortic root enlargement.  5.  Follow-up with me in October which will be a year out from his most recent event.  At that time switch ticagrelor over to Plavix.    Subjective  CC: 52-year-old white gentleman here for a follow-up visit.  Since I saw him around [facility] he had some jaw discomfort and lightheadedness and presented to the emergency department.  Work-up was unremarkable.  Since then he has jaw discomfort, he states when he moves his jaw 1 way or the other he gets this, it is not effort related.  He has no effort related angina, palpitations, shortness of breath, PND, orthopnea, syncope, dizziness or peripheral edema.  He does have occasional days where he gets lightheaded, especially jumps out of bed early, he also has noted some days where his blood pressure is elevated.    Medications  Current Outpatient Medications   Medication Sig Dispense Refill     aspirin (ASA) 81 MG EC tablet TAKE 1 TABLET (81 MG) BY MOUTH DAILY START TOMORROW. 90 tablet 2     atorvastatin (LIPITOR) 80 MG tablet Take 1 tablet (80 mg) by mouth daily 90 tablet 3     buPROPion (WELLBUTRIN SR) 100 MG 12 hr tablet Take 100 mg by mouth daily       ezetimibe (ZETIA) 10 MG tablet Take 10 mg by mouth every evening       melatonin 3 MG tablet as needed       metoprolol succinate ER (TOPROL XL) 25 MG 24 hr tablet TAKE 0.5 TABLETS BY MOUTH 2 TIMES DAILY. 90 tablet 3     omeprazole (PRILOSEC) 20 MG capsule [OMEPRAZOLE (PRILOSEC) 20 MG CAPSULE] Take 1 capsule (20 mg total) by mouth at bedtime. 30 capsule 0     polyvinyl alcohol/povidone (CLEAR EYES NATURAL TEARS OPHT) [POLYVINYL ALCOHOL/POVIDONE (CLEAR EYES NATURAL  "TEARS OPHT)] Apply 2 drops to eye 3 (three) times a day as needed.       ranolazine (RANEXA) 1000 MG TB12 12 hr tablet Take 1 tablet (1,000 mg) by mouth 2 times daily 180 tablet 3     sodium chloride (OCEAN) 0.65 % nasal spray [SODIUM CHLORIDE (OCEAN) 0.65 % NASAL SPRAY] Apply 2 sprays into each nostril as needed for congestion.       tadalafil (CIALIS) 20 MG tablet Take 1 tablet (20 mg) by mouth daily as needed 30 tablet 0     ticagrelor (BRILINTA) 90 MG tablet Take 1 tablet (90 mg) by mouth 2 times daily Start this evening. 180 tablet 3       Objective  /79 (BP Location: Right arm, Patient Position: Sitting, Cuff Size: Adult Large)   Pulse 56   Resp 16   Ht 1.778 m (5' 10\")   Wt 102.5 kg (226 lb)   BMI 32.43 kg/m      General Appearance:    Alert, cooperative, no distress, appears stated age   Head:    Normocephalic, without obvious abnormality, atraumatic   Throat:   Lips, mucosa, and tongue normal; teeth and gums normal   Neck:   Supple, symmetrical, trachea midline, no adenopathy;        thyroid:  No enlargement/tenderness/nodules; no carotid    bruit or JVD   Back:     Symmetric, no curvature, ROM normal, no CVA tenderness   Lungs:     Clear to auscultation bilaterally, respirations unlabored   Chest wall:    No tenderness or deformity   Heart:    Regular rate and rhythm, S1 and S2 normal, no murmur, rub   or gallop   Abdomen:     Soft, non-tender, bowel sounds active all four quadrants,     no masses, no organomegaly   Extremities:   Normal, atraumatic, no cyanosis or edema   Pulses:   2+ and symmetric all extremities   Skin:   Skin color, texture, turgor normal, no rashes or lesions     Results    Lab Results personally reviewed   Lab Results   Component Value Date    CHOL 95 10/17/2022    CHOL 110 03/11/2022     Lab Results   Component Value Date    HDL 37 (L) 10/17/2022    HDL 36 (L) 03/11/2022     No components found for: LDLCALC  Lab Results   Component Value Date    TRIG 79 10/17/2022    TRIG " 86 03/11/2022     Lab Results   Component Value Date    WBC 4.5 12/24/2022    HGB 13.4 12/24/2022    HCT 39.6 (L) 12/24/2022     12/24/2022     Lab Results   Component Value Date    BUN 19.8 12/24/2022     12/24/2022    CO2 24 12/24/2022

## 2023-04-12 ENCOUNTER — TELEPHONE (OUTPATIENT)
Dept: CARDIOLOGY | Facility: CLINIC | Age: 53
End: 2023-04-12
Payer: COMMERCIAL

## 2023-04-12 NOTE — TELEPHONE ENCOUNTER
[facility] Call Center    Phone Message    May a detailed message be left on voicemail: yes     Reason for Call: Medication Question or concern regarding medication   Prescription Clarification  Name of Medication: ticagrelor (BRILINTA) 90 MG tablet  Prescribing Provider: Pawel   Pharmacy:    [facility]  [place], [place] - [address] [place] [address]   What on the order needs clarification? [facility] called requesting a 2 day hold order. Patient has a colonoscopy May 18th. For further information please call [facility], thank you.    Action Taken: Message routed to:  Other: Cardiology    Travel Screening: Not Applicable     Thank you!  [facility]

## 2023-04-12 NOTE — TELEPHONE ENCOUNTER
Noted message. Pt's last coronary intervention was 10/4/22 -s/p AUREA x2 to RCA. It has not been a year yet since his last intervention.       Dr. Albarran  [facility] is requesting a 2 day hold of Donis Chauhan- see below. Is it OK for him to hold this as outlined below or hold off since it has not been a year yet?  Thanks,  Mal

## 2023-04-13 NOTE — TELEPHONE ENCOUNTER
----- Message -----  From: Masoud Albarran MD  Sent: 4/12/2023   5:48 PM CDT  To: Zulma Parham RN    I wait 6 months which it has been.  In addition, ticagelor should be held for about 5 days if they are worried about bleeding.  Lf        ==LM with above message from [facility] regarding hold orders. Clinic number left for call back should it be needed. -[facility] [place]

## 2023-05-02 ENCOUNTER — HOSPITAL ENCOUNTER (OUTPATIENT)
Dept: MRI IMAGING | Facility: HOSPITAL | Age: 53
Discharge: HOME | End: 2023-05-02
Attending: INTERNAL MEDICINE
Payer: COMMERCIAL

## 2023-05-02 VITALS
HEART RATE: 68 BPM | DIASTOLIC BLOOD PRESSURE: 75 MMHG | OXYGEN SATURATION: 91 % | RESPIRATION RATE: 18 BRPM | SYSTOLIC BLOOD PRESSURE: 131 MMHG

## 2023-05-02 DIAGNOSIS — I25.10 CORONARY ARTERY DISEASE DUE TO CALCIFIED CORONARY LESION: Primary | ICD-10-CM

## 2023-05-02 DIAGNOSIS — I25.83 CORONARY ARTERIOSCLEROSIS DUE TO LIPID RICH PLAQUE: ICD-10-CM

## 2023-05-02 DIAGNOSIS — R07.9 CHEST PAIN, UNSPECIFIED TYPE: ICD-10-CM

## 2023-05-02 DIAGNOSIS — I25.84 CORONARY ARTERY DISEASE DUE TO CALCIFIED CORONARY LESION: Primary | ICD-10-CM

## 2023-05-02 DIAGNOSIS — Q23.81 BICUSPID AORTIC VALVE: ICD-10-CM

## 2023-05-02 DIAGNOSIS — I20.89 CHRONIC STABLE ANGINA (H): ICD-10-CM

## 2023-05-02 DIAGNOSIS — I25.10 CORONARY ARTERY DISEASE DUE TO CALCIFIED CORONARY LESION: ICD-10-CM

## 2023-05-02 DIAGNOSIS — I25.84 CORONARY ARTERY DISEASE DUE TO CALCIFIED CORONARY LESION: ICD-10-CM

## 2023-05-02 LAB
ATRIAL RATE - MUSE: 61 BPM
ATRIAL RATE - MUSE: 63 BPM
DIASTOLIC BLOOD PRESSURE - MUSE: NORMAL MMHG
DIASTOLIC BLOOD PRESSURE - MUSE: NORMAL MMHG
INTERPRETATION ECG - MUSE: NORMAL
INTERPRETATION ECG - MUSE: NORMAL
P AXIS - MUSE: 56 DEGREES
P AXIS - MUSE: 62 DEGREES
PR INTERVAL - MUSE: 200 MS
PR INTERVAL - MUSE: 202 MS
QRS DURATION - MUSE: 86 MS
QRS DURATION - MUSE: 94 MS
QT - MUSE: 440 MS
QT - MUSE: 442 MS
QTC - MUSE: 444 MS
QTC - MUSE: 450 MS
R AXIS - MUSE: 18 DEGREES
R AXIS - MUSE: 31 DEGREES
SYSTOLIC BLOOD PRESSURE - MUSE: NORMAL MMHG
SYSTOLIC BLOOD PRESSURE - MUSE: NORMAL MMHG
T AXIS - MUSE: 36 DEGREES
T AXIS - MUSE: 54 DEGREES
VENTRICULAR RATE- MUSE: 61 BPM
VENTRICULAR RATE- MUSE: 63 BPM

## 2023-05-02 PROCEDURE — 93005 ELECTROCARDIOGRAM TRACING: CPT

## 2023-05-02 PROCEDURE — 93018 CV STRESS TEST I&R ONLY: CPT | Performed by: GENERAL ACUTE CARE HOSPITAL

## 2023-05-02 PROCEDURE — 250N000011 HC RX IP 250 OP 636: Performed by: INTERNAL MEDICINE

## 2023-05-02 PROCEDURE — 75563 CARD MRI W/STRESS IMG & DYE: CPT

## 2023-05-02 PROCEDURE — 93016 CV STRESS TEST SUPVJ ONLY: CPT | Performed by: INTERNAL MEDICINE

## 2023-05-02 PROCEDURE — 75563 CARD MRI W/STRESS IMG & DYE: CPT | Mod: 26 | Performed by: GENERAL ACUTE CARE HOSPITAL

## 2023-05-02 PROCEDURE — 999N000122 MR MYOCARDIUM  OVERREAD

## 2023-05-02 PROCEDURE — A9585 GADOBUTROL INJECTION: HCPCS | Performed by: INTERNAL MEDICINE

## 2023-05-02 PROCEDURE — 93010 ELECTROCARDIOGRAM REPORT: CPT | Performed by: INTERNAL MEDICINE

## 2023-05-02 PROCEDURE — 255N000002 HC RX 255 OP 636: Performed by: INTERNAL MEDICINE

## 2023-05-02 RX ORDER — AMINOPHYLLINE 25 MG/ML
50 INJECTION, SOLUTION INTRAVENOUS
Status: DISCONTINUED | OUTPATIENT
Start: 2023-05-02 | End: 2023-05-02 | Stop reason: HOSPADM

## 2023-05-02 RX ORDER — REGADENOSON 0.08 MG/ML
0.4 INJECTION, SOLUTION INTRAVENOUS ONCE
Status: COMPLETED | OUTPATIENT
Start: 2023-05-02 | End: 2023-05-02

## 2023-05-02 RX ORDER — GADOBUTROL 604.72 MG/ML
20 INJECTION INTRAVENOUS ONCE
Status: COMPLETED | OUTPATIENT
Start: 2023-05-02 | End: 2023-05-02

## 2023-05-02 RX ORDER — GADOBUTROL 604.72 MG/ML
0.1 INJECTION INTRAVENOUS ONCE
Status: DISCONTINUED | OUTPATIENT
Start: 2023-05-02 | End: 2023-05-03 | Stop reason: HOSPADM

## 2023-05-02 RX ADMIN — GADOBUTROL 20 ML: 604.72 INJECTION INTRAVENOUS at 08:45

## 2023-05-02 RX ADMIN — REGADENOSON 0.4 MG: 0.08 INJECTION, SOLUTION INTRAVENOUS at 08:38

## 2023-07-11 ENCOUNTER — LAB (OUTPATIENT)
Dept: CARDIOLOGY | Facility: CLINIC | Age: 53
End: 2023-07-11
Payer: COMMERCIAL

## 2023-07-11 DIAGNOSIS — E78.2 MIXED HYPERLIPIDEMIA: ICD-10-CM

## 2023-07-11 DIAGNOSIS — I25.83 CORONARY ARTERIOSCLEROSIS DUE TO LIPID RICH PLAQUE: Primary | ICD-10-CM

## 2023-07-11 LAB
CHOLEST SERPL-MCNC: 125 MG/DL
HDLC SERPL-MCNC: 37 MG/DL
LDLC SERPL CALC-MCNC: 66 MG/DL
NONHDLC SERPL-MCNC: 88 MG/DL
TRIGL SERPL-MCNC: 110 MG/DL

## 2023-07-11 PROCEDURE — 80061 LIPID PANEL: CPT

## 2023-07-11 PROCEDURE — 36415 COLL VENOUS BLD VENIPUNCTURE: CPT

## 2023-09-05 ENCOUNTER — MYC MEDICAL ADVICE (OUTPATIENT)
Dept: CARDIOLOGY | Facility: CLINIC | Age: 53
End: 2023-09-05

## 2023-10-15 ENCOUNTER — HEALTH MAINTENANCE LETTER (OUTPATIENT)
Age: 53
End: 2023-10-15

## 2023-11-02 ENCOUNTER — TRANSFERRED RECORDS (OUTPATIENT)
Dept: HEALTH INFORMATION MANAGEMENT | Facility: CLINIC | Age: 53
End: 2023-11-02
Payer: COMMERCIAL

## 2023-11-03 ENCOUNTER — TELEPHONE (OUTPATIENT)
Dept: CARDIOLOGY | Facility: CLINIC | Age: 53
End: 2023-11-03
Payer: COMMERCIAL

## 2023-11-03 DIAGNOSIS — I25.83 CORONARY ARTERIOSCLEROSIS DUE TO LIPID RICH PLAQUE: Primary | ICD-10-CM

## 2023-11-03 RX ORDER — PRASUGREL 10 MG/1
10 TABLET, FILM COATED ORAL DAILY
Qty: 90 TABLET | Refills: 1 | Status: SHIPPED | OUTPATIENT
Start: 2023-11-03 | End: 2024-04-30

## 2023-11-03 RX ORDER — PRASUGREL 10 MG/1
60 TABLET, FILM COATED ORAL ONCE
Qty: 6 TABLET | Refills: 0 | Status: SHIPPED | OUTPATIENT
Start: 2023-11-03 | End: 2023-11-14

## 2023-11-03 NOTE — TELEPHONE ENCOUNTER
Call from central schedulers was placed in a MyChart encounter from September:    EleonoraTyran KARLIE     BOB    11/3/23 10:14 AM  Note  Patient called again and would now like to switch over to the Effiant rx. Please send rx to [facility] . No need to call patient unless further instruction is needed. Patient would like a 90 day supply.                ==called Taurus and we discussed switch of antiplatelet. Noted that Dr. Albarran ordered this change in September and patient informed other nurse that he will call back when ready to switch/brilinta has run out. He is requesting the change now. Will submit Rx based on [facility] order in September and update will be sent on this. Pt will follow up as scheduled 11/14/23.-[facility]

## 2023-11-03 NOTE — TELEPHONE ENCOUNTER
Per 9/5/23 telephone encounter with [facility]:    September 5, 2023  Cady Nguyen RN     KF    9/5/23  4:21 PM  Note  Reached out to patient with recommendation below. He just ordered Brilinta and just got laid off so is not sure he wants to make the switch. Patient is in the car wash so requesting message through [employer] with name of medication. [employer] message sent. Patient will do some research and call back I he chooses to make the switch to Effient.   ----------------------------------------------------  Masoud Albarran MD  You24 minutes ago (3:51 PM)      LF  His stress MRI is normal and this is comforting.  Given the shortness of breath I think it would be reasonable to switch him over to Effient.  Can we arrange to give him a 60 mg Effient load followed by 10 mg a day and discontinue ticagrelor?  LF

## 2023-11-03 NOTE — TELEPHONE ENCOUNTER
Patient called again and would now like to switch over to the Effiant rx. Please send rx to [facility] . No need to call patient unless further instruction is needed. Patient would like a 90 day supply.

## 2023-11-06 NOTE — TELEPHONE ENCOUNTER
----- Message -----  From: Masoud Albarran MD  Sent: 11/3/2023   5:44 PM CST  To: Zulma Parham RN    Can you follow-up with him sometime next week to make sure his shortness of breath is improving?  LF  ----- Message -----  From: Zulma Parham RN  Sent: 11/3/2023   3:28 PM CDT  To: Masoud Albarran MD    ----- Message from Zulma Parham RN sent at 11/3/2023  3:28 PM CDT -----  CHRISTIAN An only- see your message from 9/5/23 telephone encounter. Pt reporting shortness of breath and your recommended changing from Brilinta to Effient with 60 mg load day 1 then 10 mg daily thereafter. Pt wanted to wait until his brilinta ran out and call back based on documentation with other nurse. He called back now and requested new Rx be sent in. I did this and he will see you 11/14.  Thanks,  Mal        ==noted message. Will reach out mid-week but pt did have a few more days of Brilinta before switch. -[facility] [place]

## 2023-11-14 ENCOUNTER — OFFICE VISIT (OUTPATIENT)
Dept: CARDIOLOGY | Facility: CLINIC | Age: 53
End: 2023-11-14
Payer: COMMERCIAL

## 2023-11-14 VITALS
SYSTOLIC BLOOD PRESSURE: 120 MMHG | RESPIRATION RATE: 16 BRPM | OXYGEN SATURATION: 95 % | WEIGHT: 227 LBS | HEART RATE: 59 BPM | DIASTOLIC BLOOD PRESSURE: 72 MMHG | BODY MASS INDEX: 32.57 KG/M2

## 2023-11-14 DIAGNOSIS — E66.9 OBESITY (BMI 35.0-39.9 WITHOUT COMORBIDITY): ICD-10-CM

## 2023-11-14 DIAGNOSIS — I25.83 CORONARY ARTERIOSCLEROSIS DUE TO LIPID RICH PLAQUE: Primary | ICD-10-CM

## 2023-11-14 DIAGNOSIS — I10 ESSENTIAL HYPERTENSION: ICD-10-CM

## 2023-11-14 DIAGNOSIS — E78.2 MIXED HYPERLIPIDEMIA: ICD-10-CM

## 2023-11-14 DIAGNOSIS — G47.33 OSA (OBSTRUCTIVE SLEEP APNEA): ICD-10-CM

## 2023-11-14 DIAGNOSIS — I20.89 CHRONIC STABLE ANGINA (H): ICD-10-CM

## 2023-11-14 DIAGNOSIS — Q23.81 BICUSPID AORTIC VALVE: ICD-10-CM

## 2023-11-14 PROCEDURE — 99214 OFFICE O/P EST MOD 30 MIN: CPT | Performed by: INTERNAL MEDICINE

## 2023-11-14 NOTE — LETTER
11/14/2023    Nita Ratliff MD  [address]  [place] [address]    RE: Taurus Cotto       Dear Colleague,     I had the pleasure of seeing Taurus Cotto in the [facility].      [facility]  Heart Care Clinic Follow-up Note    Assessment & Plan        (I25.10,  I25.83) Coronary arteriosclerosis due to lipid rich plaque  (primary encounter diagnosis)  Comment: Given increased chest discomfort he underwent angiography October 17, 2022 showing normal left main, mid LAD in-stent 10% restenosis, circumflex with mid 50% stenosis and the right coronary artery with a 70% proximal to mid stenosis that was significant by FFR and received Synergy AUREA 3.5 x 38 mm and 3.5 x 12 mm stents.  Stress MRI(Q23.1) Bicuspid aortic valve  Comment: Based on echo from January 2020 dimensionless index 0.5 with a valve area 1.6 cm , mean gradient of 9 mmHg, peak gradient of 16 mmHg mean velocity 1.4 m/s.  Most recent echo however read as tricuspid with aortic insufficiency +1, dimensionless index 0.6 with mean gradient of 10 and peak gradient of 16 with mean velocity 2.0 m/s.  We will get stress MRI in the was negative for ischemia, or scar, and he is feeling well now on Effient and aspirin which we will continue chronically.He is on Ranexa as an antianginal with good QT interval.     (I10) Essential hypertension  Comment: Under good control currently on metoprolol.     (E78.2) Mixed hyperlipidemia  Comment: Excellent total cholesterol of 125 with an LDL of 66.  He also has elevated LP(a) of 75.       (E66.9) Obesity (BMI 35.0-39.9 without comorbidity)  Comment: He has lost weight, not using CPAP.     (G47.33) IDA (obstructive sleep apnea)  Comment: Never was really tolerant of CPAP.  Might consider retesting now that he has lost weight.    (Q23.1) Bicuspid aortic valve  Comment: Fusion of the right and left coronary cusp, at the most mild aortic stenosis.  Asymptomatic.  Recheck echo in about a  year.    (I20.89) Chronic stable angina  Comment: On Ranexa, if continues to do well we will consider tapering him off Ranexa.    Plan  1.  Continue to work on weight loss.  2.  If he does well consider tapering him off Ranexa.  3.  Continue indefinite aspirin and Effient.  4.  Follow-up with me in October, which would be a year out from his most recent intervention.  5.  Consider rechecking echo a year to 2 years out from May which showed MRI bicuspid valve.    Subjective  CC: 53-year-old white gentleman here for follow-up visit.  Since I seen him we switched him from Brilinta over to Effient, he just made this switch 3 days ago.  He tells me his breathing is better, denies any significant syncope, presyncope, chest pains, palpitations, PND, orthopnea or peripheral edema.    Medications  Current Outpatient Medications   Medication Sig Dispense Refill    aspirin (ASA) 81 MG EC tablet TAKE 1 TABLET (81 MG) BY MOUTH DAILY START TOMORROW. 90 tablet 2    atorvastatin (LIPITOR) 80 MG tablet Take 1 tablet (80 mg) by mouth daily 90 tablet 3    buPROPion (WELLBUTRIN SR) 100 MG 12 hr tablet Take 100 mg by mouth daily      melatonin 3 MG tablet as needed      metoprolol succinate ER (TOPROL XL) 25 MG 24 hr tablet TAKE 0.5 TABLETS BY MOUTH 2 TIMES DAILY. 90 tablet 3    omeprazole (PRILOSEC) 20 MG capsule [OMEPRAZOLE (PRILOSEC) 20 MG CAPSULE] Take 1 capsule (20 mg total) by mouth at bedtime. 30 capsule 0    polyvinyl alcohol/povidone (CLEAR EYES NATURAL TEARS OPHT) [POLYVINYL ALCOHOL/POVIDONE (CLEAR EYES NATURAL TEARS OPHT)] Apply 2 drops to eye 3 (three) times a day as needed.      prasugrel (EFFIENT) 10 MG TABS tablet Take 1 tablet (10 mg) by mouth daily 90 tablet 1    ranolazine (RANEXA) 1000 MG TB12 12 hr tablet Take 1 tablet (1,000 mg) by mouth 2 times daily 180 tablet 3    sodium chloride (OCEAN) 0.65 % nasal spray [SODIUM CHLORIDE (OCEAN) 0.65 % NASAL SPRAY] Apply 2 sprays into each nostril as needed for congestion.       "tadalafil (CIALIS) 20 MG tablet Take 1 tablet (20 mg) by mouth daily as needed 30 tablet 0       Objective  /72 (BP Location: Left arm, Patient Position: Sitting, Cuff Size: Adult Large)   Pulse 59   Resp 16   Wt 103 kg (227 lb)   SpO2 95%   BMI 32.57 kg/m      General Appearance:    Alert, cooperative, no distress, appears stated age   Head:    Normocephalic, without obvious abnormality, atraumatic   Throat:   Lips, mucosa, and tongue normal; teeth and gums normal   Neck:   Supple, symmetrical, trachea midline, no adenopathy;        thyroid:  No enlargement/tenderness/nodules; no carotid    bruit or JVD   Back:     Symmetric, no curvature, ROM normal, no CVA tenderness   Lungs:     Clear to auscultation bilaterally, respirations unlabored   Chest wall:    No tenderness or deformity   Heart:    Regular rate and rhythm, S1 and S2 normal, no murmur, rub   or gallop   Abdomen:     Soft, non-tender, bowel sounds active all four quadrants,     no masses, no organomegaly   Extremities:   Normal, atraumatic, no cyanosis or edema   Pulses:   2+ and symmetric all extremities   Skin:   Skin color, texture, turgor normal, no rashes or lesions     Results    Lab Results personally reviewed   Lab Results   Component Value Date    CHOL 125 07/11/2023    CHOL 95 10/17/2022     Lab Results   Component Value Date    HDL 37 (L) 07/11/2023    HDL 37 (L) 10/17/2022     No components found for: \"LDLCALC\"  Lab Results   Component Value Date    TRIG 110 07/11/2023    TRIG 79 10/17/2022     Lab Results   Component Value Date    WBC 4.5 12/24/2022    HGB 13.4 12/24/2022    HCT 39.6 (L) 12/24/2022     12/24/2022     Lab Results   Component Value Date    BUN 19.8 12/24/2022     12/24/2022    CO2 24 12/24/2022           Thank you for allowing me to participate in the care of your patient.      Sincerely,     WILLAM MARTÍNEZ MD   [facility]  cc:   Les B Forgosh, " MD  [address] [facility], [address]  [place] [address]

## 2023-11-14 NOTE — PROGRESS NOTES
[facility]  Heart Care Clinic Follow-up Note    Assessment & Plan        (I25.10,  I25.83) Coronary arteriosclerosis due to lipid rich plaque  (primary encounter diagnosis)  Comment: Given increased chest discomfort he underwent angiography October 17, 2022 showing normal left main, mid LAD in-stent 10% restenosis, circumflex with mid 50% stenosis and the right coronary artery with a 70% proximal to mid stenosis that was significant by FFR and received Synergy AUREA 3.5 x 38 mm and 3.5 x 12 mm stents.  Stress MRI(Q23.1) Bicuspid aortic valve  Comment: Based on echo from January 2020 dimensionless index 0.5 with a valve area 1.6 cm , mean gradient of 9 mmHg, peak gradient of 16 mmHg mean velocity 1.4 m/s.  Most recent echo however read as tricuspid with aortic insufficiency +1, dimensionless index 0.6 with mean gradient of 10 and peak gradient of 16 with mean velocity 2.0 m/s.  We will get stress MRI in the was negative for ischemia, or scar, and he is feeling well now on Effient and aspirin which we will continue chronically.He is on Ranexa as an antianginal with good QT interval.     (I10) Essential hypertension  Comment: Under good control currently on metoprolol.     (E78.2) Mixed hyperlipidemia  Comment: Excellent total cholesterol of 125 with an LDL of 66.  He also has elevated LP(a) of 75.       (E66.9) Obesity (BMI 35.0-39.9 without comorbidity)  Comment: He has lost weight, not using CPAP.     (G47.33) IDA (obstructive sleep apnea)  Comment: Never was really tolerant of CPAP.  Might consider retesting now that he has lost weight.    (Q23.1) Bicuspid aortic valve  Comment: Fusion of the right and left coronary cusp, at the most mild aortic stenosis.  Asymptomatic.  Recheck echo in about a year.    (I20.89) Chronic stable angina  Comment: On Ranexa, if continues to do well we will consider tapering him off Ranexa.    Plan  1.  Continue to work on weight loss.  2.  If he does well consider tapering him  off Ranexa.  3.  Continue indefinite aspirin and Effient.  4.  Follow-up with me in October, which would be a year out from his most recent intervention.  5.  Consider rechecking echo a year to 2 years out from May which showed MRI bicuspid valve.    Subjective  CC: 53-year-old white gentleman here for follow-up visit.  Since I seen him we switched him from Brilinta over to Effient, he just made this switch 3 days ago.  He tells me his breathing is better, denies any significant syncope, presyncope, chest pains, palpitations, PND, orthopnea or peripheral edema.    Medications  Current Outpatient Medications   Medication Sig Dispense Refill    aspirin (ASA) 81 MG EC tablet TAKE 1 TABLET (81 MG) BY MOUTH DAILY START TOMORROW. 90 tablet 2    atorvastatin (LIPITOR) 80 MG tablet Take 1 tablet (80 mg) by mouth daily 90 tablet 3    buPROPion (WELLBUTRIN SR) 100 MG 12 hr tablet Take 100 mg by mouth daily      melatonin 3 MG tablet as needed      metoprolol succinate ER (TOPROL XL) 25 MG 24 hr tablet TAKE 0.5 TABLETS BY MOUTH 2 TIMES DAILY. 90 tablet 3    omeprazole (PRILOSEC) 20 MG capsule [OMEPRAZOLE (PRILOSEC) 20 MG CAPSULE] Take 1 capsule (20 mg total) by mouth at bedtime. 30 capsule 0    polyvinyl alcohol/povidone (CLEAR EYES NATURAL TEARS OPHT) [POLYVINYL ALCOHOL/POVIDONE (CLEAR EYES NATURAL TEARS OPHT)] Apply 2 drops to eye 3 (three) times a day as needed.      prasugrel (EFFIENT) 10 MG TABS tablet Take 1 tablet (10 mg) by mouth daily 90 tablet 1    ranolazine (RANEXA) 1000 MG TB12 12 hr tablet Take 1 tablet (1,000 mg) by mouth 2 times daily 180 tablet 3    sodium chloride (OCEAN) 0.65 % nasal spray [SODIUM CHLORIDE (OCEAN) 0.65 % NASAL SPRAY] Apply 2 sprays into each nostril as needed for congestion.      tadalafil (CIALIS) 20 MG tablet Take 1 tablet (20 mg) by mouth daily as needed 30 tablet 0       Objective  /72 (BP Location: Left arm, Patient Position: Sitting, Cuff Size: Adult Large)   Pulse 59   Resp 16  "  Wt 103 kg (227 lb)   SpO2 95%   BMI 32.57 kg/m      General Appearance:    Alert, cooperative, no distress, appears stated age   Head:    Normocephalic, without obvious abnormality, atraumatic   Throat:   Lips, mucosa, and tongue normal; teeth and gums normal   Neck:   Supple, symmetrical, trachea midline, no adenopathy;        thyroid:  No enlargement/tenderness/nodules; no carotid    bruit or JVD   Back:     Symmetric, no curvature, ROM normal, no CVA tenderness   Lungs:     Clear to auscultation bilaterally, respirations unlabored   Chest wall:    No tenderness or deformity   Heart:    Regular rate and rhythm, S1 and S2 normal, no murmur, rub   or gallop   Abdomen:     Soft, non-tender, bowel sounds active all four quadrants,     no masses, no organomegaly   Extremities:   Normal, atraumatic, no cyanosis or edema   Pulses:   2+ and symmetric all extremities   Skin:   Skin color, texture, turgor normal, no rashes or lesions     Results    Lab Results personally reviewed   Lab Results   Component Value Date    CHOL 125 07/11/2023    CHOL 95 10/17/2022     Lab Results   Component Value Date    HDL 37 (L) 07/11/2023    HDL 37 (L) 10/17/2022     No components found for: \"LDLCALC\"  Lab Results   Component Value Date    TRIG 110 07/11/2023    TRIG 79 10/17/2022     Lab Results   Component Value Date    WBC 4.5 12/24/2022    HGB 13.4 12/24/2022    HCT 39.6 (L) 12/24/2022     12/24/2022     Lab Results   Component Value Date    BUN 19.8 12/24/2022     12/24/2022    CO2 24 12/24/2022           "

## 2023-11-14 NOTE — PATIENT INSTRUCTIONS
Mr Taurus Cotto,  I enjoyed visiting with you again today.  I am glad to hear you are doing well.  Per our conversation continue the current meds and try tylenol and if not effective an occasional advil with food.  I will plan on seeing you October or sooner if needed.  Masoud Albarran

## 2024-01-17 ENCOUNTER — TRANSFERRED RECORDS (OUTPATIENT)
Dept: HEALTH INFORMATION MANAGEMENT | Facility: CLINIC | Age: 54
End: 2024-01-17

## 2024-01-17 ENCOUNTER — LAB REQUISITION (OUTPATIENT)
Dept: LAB | Facility: CLINIC | Age: 54
End: 2024-01-17

## 2024-01-17 DIAGNOSIS — Z12.5 ENCOUNTER FOR SCREENING FOR MALIGNANT NEOPLASM OF PROSTATE: ICD-10-CM

## 2024-01-17 DIAGNOSIS — I10 ESSENTIAL (PRIMARY) HYPERTENSION: ICD-10-CM

## 2024-01-17 PROCEDURE — 80053 COMPREHEN METABOLIC PANEL: CPT | Performed by: FAMILY MEDICINE

## 2024-01-17 PROCEDURE — G0103 PSA SCREENING: HCPCS | Performed by: FAMILY MEDICINE

## 2024-01-18 LAB
ALBUMIN SERPL BCG-MCNC: 4.4 G/DL (ref 3.5–5.2)
ALP SERPL-CCNC: 95 U/L (ref 40–150)
ALT SERPL W P-5'-P-CCNC: 46 U/L (ref 0–70)
ANION GAP SERPL CALCULATED.3IONS-SCNC: 9 MMOL/L (ref 7–15)
AST SERPL W P-5'-P-CCNC: 26 U/L (ref 0–45)
BILIRUB SERPL-MCNC: 1.1 MG/DL
BUN SERPL-MCNC: 25 MG/DL (ref 6–20)
CALCIUM SERPL-MCNC: 9.1 MG/DL (ref 8.6–10)
CHLORIDE SERPL-SCNC: 106 MMOL/L (ref 98–107)
CREAT SERPL-MCNC: 1.19 MG/DL (ref 0.67–1.17)
DEPRECATED HCO3 PLAS-SCNC: 24 MMOL/L (ref 22–29)
EGFRCR SERPLBLD CKD-EPI 2021: 73 ML/MIN/1.73M2
GLUCOSE SERPL-MCNC: 91 MG/DL (ref 70–99)
POTASSIUM SERPL-SCNC: 4.7 MMOL/L (ref 3.4–5.3)
PROT SERPL-MCNC: 6.8 G/DL (ref 6.4–8.3)
PSA SERPL DL<=0.01 NG/ML-MCNC: 2.2 NG/ML (ref 0–3.5)
SODIUM SERPL-SCNC: 139 MMOL/L (ref 135–145)

## 2024-04-23 ENCOUNTER — TELEPHONE (OUTPATIENT)
Dept: CARDIOLOGY | Facility: CLINIC | Age: 54
End: 2024-04-23
Payer: COMMERCIAL

## 2024-04-23 DIAGNOSIS — I20.89 CHRONIC STABLE ANGINA (H): ICD-10-CM

## 2024-04-23 RX ORDER — RANOLAZINE 500 MG/1
500 TABLET, EXTENDED RELEASE ORAL 2 TIMES DAILY
Qty: 180 TABLET | Refills: 0 | Status: SHIPPED | OUTPATIENT
Start: 2024-04-23 | End: 2024-06-11

## 2024-04-23 NOTE — TELEPHONE ENCOUNTER
Per 11/14/23 OV with LBF:      Plan  1.  Continue to work on weight loss.  2.  If he does well consider tapering him off Ranexa.  3.  Continue indefinite aspirin and Effient.  4.  Follow-up with me in October, which would be a year out from his most recent intervention.  5.  Consider rechecking echo a year to 2 years out from May which showed MRI bicuspid valve.           Called back Alec to address his concerns. He reports that he has been doing well since seeing Dr. Albarran in the Fall. No change in his health and he experiences very little, if any, anginal symptoms. He has no issues tolerating Ranexa however, he is interested in tapering off the medication as discussed to see how he does and if he even needs it anymore. Will route.  -[facility] [place]          Dr. Albarran,  See above + your OV note from 11/14/23. Alec called in to inquire if you would still recommend tapering down/off his Ranexa. Recommendations?  Thanks,  Mal

## 2024-04-23 NOTE — TELEPHONE ENCOUNTER
[facility] Call Center    Phone Message    May a detailed message be left on voicemail: yes     Reason for Call: Medication Question or concern regarding medication   Prescription Clarification  Name of Medication: ranolazine (RANEXA) 1000 MG TB12 12 hr tablet [81024]  Prescribing Provider: Dr. Albarran   Pharmacy:    [address] [place] [address]     What on the order needs clarification? Alec is just about out of his medication but he remembers from his last visit that Dr. Albarran was considering taking him off this medication and would like to know what Dr. Albarran has decided. Please reach out to Alec to discuss. Thank you!      Action Taken: Other: Cardiology    Travel Screening: Not Applicable    Thank you!  [facility]

## 2024-04-23 NOTE — TELEPHONE ENCOUNTER
Medication list updated and refill sent in with lower dose of Ranexa. LVM with Alec to discuss this with direct line for call back. -[facility] [place]          ----- Message -----  From: Masoud Albarran MD  Sent: 4/23/2024   1:09 PM CDT  To: Zulma Parham RN    If in fact he is still taking Ranexa 1000 mg twice daily, let us changes to 500 mg twice daily and see how he does.  If he is in fact taking Ranexa 500 mg twice daily, let us go down to 500 mg once a day and let us know how it goes.  LF

## 2024-04-24 NOTE — TELEPHONE ENCOUNTER
Called Alec and was able to reach him today. He verbalized understanding of recommendations and will go to 500 mg twice daily of Ranexa. He will update us in a few weeks with how this is going. -[facility] [place]

## 2024-04-30 DIAGNOSIS — I25.83 CORONARY ARTERIOSCLEROSIS DUE TO LIPID RICH PLAQUE: ICD-10-CM

## 2024-04-30 RX ORDER — PRASUGREL 10 MG/1
10 TABLET, FILM COATED ORAL DAILY
Qty: 90 TABLET | Refills: 1 | Status: SHIPPED | OUTPATIENT
Start: 2024-04-30 | End: 2024-06-11

## 2024-07-16 ENCOUNTER — HOSPITAL ENCOUNTER (EMERGENCY)
Facility: HOSPITAL | Age: 54
Discharge: HOME | End: 2024-07-16
Attending: EMERGENCY MEDICINE | Admitting: EMERGENCY MEDICINE
Payer: COMMERCIAL

## 2024-07-16 ENCOUNTER — APPOINTMENT (OUTPATIENT)
Dept: RADIOLOGY | Facility: HOSPITAL | Age: 54
End: 2024-07-16
Attending: EMERGENCY MEDICINE
Payer: COMMERCIAL

## 2024-07-16 VITALS
HEART RATE: 53 BPM | HEIGHT: 70 IN | OXYGEN SATURATION: 94 % | DIASTOLIC BLOOD PRESSURE: 81 MMHG | WEIGHT: 220 LBS | SYSTOLIC BLOOD PRESSURE: 130 MMHG | BODY MASS INDEX: 31.5 KG/M2 | TEMPERATURE: 98.3 F | RESPIRATION RATE: 16 BRPM

## 2024-07-16 DIAGNOSIS — R06.00 DYSPNEA, UNSPECIFIED TYPE: ICD-10-CM

## 2024-07-16 DIAGNOSIS — R53.83 OTHER FATIGUE: ICD-10-CM

## 2024-07-16 LAB
ALBUMIN SERPL BCG-MCNC: 4.3 G/DL (ref 3.5–5.2)
ALP SERPL-CCNC: 100 U/L (ref 40–150)
ALT SERPL W P-5'-P-CCNC: 39 U/L (ref 0–70)
ANION GAP SERPL CALCULATED.3IONS-SCNC: 12 MMOL/L (ref 7–15)
AST SERPL W P-5'-P-CCNC: 25 U/L (ref 0–45)
BASOPHILS # BLD AUTO: 0.1 10E3/UL (ref 0–0.2)
BASOPHILS NFR BLD AUTO: 1 %
BILIRUB DIRECT SERPL-MCNC: 0.23 MG/DL (ref 0–0.3)
BILIRUB SERPL-MCNC: 1 MG/DL
BUN SERPL-MCNC: 19 MG/DL (ref 6–20)
CALCIUM SERPL-MCNC: 8.9 MG/DL (ref 8.8–10.4)
CHLORIDE SERPL-SCNC: 106 MMOL/L (ref 98–107)
CREAT SERPL-MCNC: 1.07 MG/DL (ref 0.67–1.17)
EGFRCR SERPLBLD CKD-EPI 2021: 82 ML/MIN/1.73M2
EOSINOPHIL # BLD AUTO: 0.3 10E3/UL (ref 0–0.7)
EOSINOPHIL NFR BLD AUTO: 4 %
ERYTHROCYTE [DISTWIDTH] IN BLOOD BY AUTOMATED COUNT: 11.9 % (ref 10–15)
GLUCOSE SERPL-MCNC: 138 MG/DL (ref 70–99)
HCO3 SERPL-SCNC: 22 MMOL/L (ref 22–29)
HCT VFR BLD AUTO: 41.5 % (ref 40–53)
HGB BLD-MCNC: 14.1 G/DL (ref 13.3–17.7)
HOLD SPECIMEN: NORMAL
IMM GRANULOCYTES # BLD: 0 10E3/UL
IMM GRANULOCYTES NFR BLD: 1 %
LYMPHOCYTES # BLD AUTO: 2.8 10E3/UL (ref 0.8–5.3)
LYMPHOCYTES NFR BLD AUTO: 35 %
MAGNESIUM SERPL-MCNC: 1.8 MG/DL (ref 1.7–2.3)
MCH RBC QN AUTO: 29.6 PG (ref 26.5–33)
MCHC RBC AUTO-ENTMCNC: 34 G/DL (ref 31.5–36.5)
MCV RBC AUTO: 87 FL (ref 78–100)
MONOCYTES # BLD AUTO: 0.6 10E3/UL (ref 0–1.3)
MONOCYTES NFR BLD AUTO: 7 %
NEUTROPHILS # BLD AUTO: 4.4 10E3/UL (ref 1.6–8.3)
NEUTROPHILS NFR BLD AUTO: 53 %
NRBC # BLD AUTO: 0 10E3/UL
NRBC BLD AUTO-RTO: 0 /100
NT-PROBNP SERPL-MCNC: 42 PG/ML (ref 0–900)
PLATELET # BLD AUTO: 229 10E3/UL (ref 150–450)
POTASSIUM SERPL-SCNC: 3.9 MMOL/L (ref 3.4–5.3)
PROT SERPL-MCNC: 6.6 G/DL (ref 6.4–8.3)
RBC # BLD AUTO: 4.77 10E6/UL (ref 4.4–5.9)
SODIUM SERPL-SCNC: 140 MMOL/L (ref 135–145)
TROPONIN T SERPL HS-MCNC: 9 NG/L
TSH SERPL DL<=0.005 MIU/L-ACNC: 3.01 UIU/ML (ref 0.3–4.2)
WBC # BLD AUTO: 8.2 10E3/UL (ref 4–11)

## 2024-07-16 PROCEDURE — 71046 X-RAY EXAM CHEST 2 VIEWS: CPT

## 2024-07-16 PROCEDURE — 82248 BILIRUBIN DIRECT: CPT | Performed by: EMERGENCY MEDICINE

## 2024-07-16 PROCEDURE — 84484 ASSAY OF TROPONIN QUANT: CPT | Performed by: STUDENT IN AN ORGANIZED HEALTH CARE EDUCATION/TRAINING PROGRAM

## 2024-07-16 PROCEDURE — 99285 EMERGENCY DEPT VISIT HI MDM: CPT | Mod: 25

## 2024-07-16 PROCEDURE — 36415 COLL VENOUS BLD VENIPUNCTURE: CPT | Performed by: STUDENT IN AN ORGANIZED HEALTH CARE EDUCATION/TRAINING PROGRAM

## 2024-07-16 PROCEDURE — 93005 ELECTROCARDIOGRAM TRACING: CPT | Mod: RTG

## 2024-07-16 PROCEDURE — 84443 ASSAY THYROID STIM HORMONE: CPT | Performed by: EMERGENCY MEDICINE

## 2024-07-16 PROCEDURE — 82374 ASSAY BLOOD CARBON DIOXIDE: CPT | Performed by: STUDENT IN AN ORGANIZED HEALTH CARE EDUCATION/TRAINING PROGRAM

## 2024-07-16 PROCEDURE — 83880 ASSAY OF NATRIURETIC PEPTIDE: CPT | Performed by: EMERGENCY MEDICINE

## 2024-07-16 PROCEDURE — 85025 COMPLETE CBC W/AUTO DIFF WBC: CPT | Performed by: STUDENT IN AN ORGANIZED HEALTH CARE EDUCATION/TRAINING PROGRAM

## 2024-07-16 PROCEDURE — 83735 ASSAY OF MAGNESIUM: CPT | Performed by: EMERGENCY MEDICINE

## 2024-07-16 ASSESSMENT — ACTIVITIES OF DAILY LIVING (ADL)
ADLS_ACUITY_SCORE: 36
ADLS_ACUITY_SCORE: 36

## 2024-07-16 ASSESSMENT — COLUMBIA-SUICIDE SEVERITY RATING SCALE - C-SSRS
1. IN THE PAST MONTH, HAVE YOU WISHED YOU WERE DEAD OR WISHED YOU COULD GO TO SLEEP AND NOT WAKE UP?: NO
2. HAVE YOU ACTUALLY HAD ANY THOUGHTS OF KILLING YOURSELF IN THE PAST MONTH?: NO
6. HAVE YOU EVER DONE ANYTHING, STARTED TO DO ANYTHING, OR PREPARED TO DO ANYTHING TO END YOUR LIFE?: NO

## 2024-07-17 NOTE — ED PROVIDER NOTES
"  Emergency Department Encounter     Evaluation Date & Time:   7/16/2024  7:08 PM    CHIEF COMPLAINT:  Chest Pain, Shortness of Breath, Lightheaded, and Fatigue      Triage Note:Pt c/o chest pain (described as chest \"weirdness\"), shortness of breath with exertion, lightheadedness, and fatigue for past 2 weeks. Pt states he intermittently gets pain to both arms. Pt denies nausea, vomiting, and diarrhea. Pt has 5 stents.          ED COURSE & MEDICAL DECISION MAKING:     Pt here with wife for evaluation of symptoms for the past couple weeks, including \"waves of fatigue\", lightheadedness, dyspnea with walking up steps.  Pt has previous CAD s/p stents with last stenting in October, 2022, follows with cardiology and compliant with all meds, including his prasugrel.  Pt reports he has chronic angina and denies any change with this. Doesn't endorse any sort of actual chest pain, but feels \"chesty\" and \"strange in chest\" for weeks as well. No cough, no new calf swelling/pain or hx of dvt/pe.  Pt was able to mow lawn last week without symptoms.  He has not yet reached out to his cardiologist.  EKG non-ischemic. Will get labs, CXR, reassess.      ED Course as of 07/16/24 2109 Tue Jul 16, 2024 1921 I met with the patient, obtained history, performed an initial exam, and discussed options and plan for diagnostics and treatment here in the ED.     1942 CBC reassuring, normal WBC and Hgb.   2024 hsTrop 9, below gender reference range with atypical symptoms for several days. ACS ruled out. Rest of labs reassuring, including normal electrolytes and BNP.   2048 CXR (independent interpretation): no acute cardiopulmonary process    2108 Pt re-evaluated, updated on all results. Remains well here. Given CAD history, will place rapid access cardiology referral for close follow up.  Pt advised to avoid exertional activity, return for worsening symptoms/concerns.           Medical Decision Making    History:  Supplemental history from: " N/A  External Record(s) reviewed: Documented in chart    Work Up:  Chart documentation includes differential considered and any EKGs or imaging independently interpreted by provider, where specified.  In additional to work up documented, I considered the following work up: Documented in chart, if applicable.    External consultation:  Discussion of management with another provider: Documented in chart, if applicable    Complicating factors:  Care impacted by chronic illness: Heart Disease  Care affected by social determinants of health: N/A    Disposition considerations: Discharge. No recommendations on prescription strength medication(s). I considered admission, but ultimately discharged patient after reassuring evaluation, outpatient cardiology follow up plan.      At the conclusion of the encounter I discussed the results of all the tests and the disposition. The questions were answered. The patient or family acknowledged understanding and was agreeable with the care plan.      MEDICATIONS GIVEN IN THE EMERGENCY DEPARTMENT:  Medications - No data to display    NEW PRESCRIPTIONS STARTED AT TODAY'S ED VISIT:  New Prescriptions    No medications on file       HPI   HPI     Taurus Cotto is a 53 year old male with a pertinent history of hypertension, hyperlipidemia, and chronic stable angina who presents to this ED via private vehicle for evaluation of lightheadedness, dizziness, fatigue, shortness of breath, hypertension, and weird chest feeling.     Per patient, he gets waves of lightheadedness, fatigue dizziness and the weird chest feeling. The shortness of breath only happens when he walks up stairs, noting that he was able to mow the lawn with no shortness of breath last week. Also mentions that he has had shortness of breath before but it was generalized not specific to stair walking. Patient endorses gradual ankle edema, medication compliance, and recent travel to [place], [place]. Patient also endorses  chronic angina and history of stents. Last stent being in October of 2022. Patient notes once incident of bright red bloody stool  but reports that is from lesion around buttock area.     Patient denies black stool, history of blood clots and scheduling or having  cardio consultation,    REVIEW OF SYSTEMS:  See HPI      Medical History     Past Medical History:   Diagnosis Date    Cardiac murmur     Coronary artery disease     GERD (gastroesophageal reflux disease)     High cholesterol     Hyperlipidemia     Hypertension        Past Surgical History:   Procedure Laterality Date    CARDIAC CATHETERIZATION  07/31/2017    AUREA to distal RCA    CARDIAC CATHETERIZATION N/A 7/31/2017    Procedure: Coronary Angiogram;  Surgeon: Dandre Love MD;  Location: [facility] Cath Lab;  Service:     CORONARY STENT PLACEMENT      CV CORONARY ANGIOGRAM N/A 3/2/2021    Procedure: Coronary Angiogram;  Surgeon: Marivel Loo MD;  Location: [facility] Cath Lab;  Service: Cardiology    CV CORONARY ANGIOGRAM N/A 10/17/2022    Procedure: Coronary Angiogram;  Surgeon: Manuel Mclain MD;  Location: [facility] CV    CV FRACTIONAL FLOW RATIO WIRE N/A 10/17/2022    Procedure: Fractional Flow Ratio Wire;  Surgeon: Manuel Mclain MD;  Location: [facility] CV    CV LEFT HEART CATH N/A 10/17/2022    Procedure: Left Heart Catheterization;  Surgeon: Manuel Mclain MD;  Location: [facility] CV    CV LEFT HEART CATHETERIZATION WITHOUT LEFT VENTRICULOGRAM Left 3/2/2021    Procedure: Left Heart Catheterization Without Left Ventriculogram;  Surgeon: Marivel Loo MD;  Location: [facility] Cath Lab;  Service: Cardiology    CV PCI ANGIOPLASTY N/A 10/17/2022    Procedure: Percutaneous Coronary Intervention - Angioplasty;  Surgeon: Manuel Mclain MD;  Location: [facility] CV    CV PCI STENT DRUG ELUTING N/A 10/17/2022    Procedure: Percutaneous Coronary Intervention Stent;  Surgeon:  "Manuel Mclain MD;  Location: [facility] CATH LAB CV    FRACTURE SURGERY      HERNIA REPAIR      ME CATH PLMT L HRT & ARTS W/NJX & ANGIO IMG S&I Left 7/31/2017    Procedure: Left Heart Catheterization Without Left Ventriculogram;  Surgeon: Dandre Love MD;  Location: [facility] Cath Lab;  Service: Cardiology    RELEASE CARPAL TUNNEL Right        No family history on file.    Social History     Tobacco Use    Smoking status: Former     Types: Cigarettes, Cigars    Smokeless tobacco: Never    Tobacco comments:     still occasionally smokes cigars   Vaping Use    Vaping status: Never Used   Substance Use Topics    Alcohol use: No    Drug use: Not Currently       aspirin (ASA) 81 MG EC tablet  atorvastatin (LIPITOR) 80 MG tablet  buPROPion (WELLBUTRIN SR) 100 MG 12 hr tablet  melatonin 3 MG tablet  metoprolol succinate ER (TOPROL XL) 25 MG 24 hr tablet  omeprazole (PRILOSEC) 20 MG capsule  polyvinyl alcohol/povidone (CLEAR EYES NATURAL TEARS OPHT)  prasugrel (EFFIENT) 10 MG TABS tablet  ranolazine (RANEXA) 500 MG 12 hr tablet  sodium chloride (OCEAN) 0.65 % nasal spray  tadalafil (CIALIS) 20 MG tablet        Physical Exam     Vitals:  BP (!) 145/86   Pulse 65   Temp 98.3  F (36.8  C) (Oral)   Resp 16   Ht 1.778 m (5' 10\")   Wt 99.8 kg (220 lb)   SpO2 94%   BMI 31.57 kg/m      PHYSICAL EXAM:   Physical Exam  Vitals and nursing note reviewed.   Constitutional:       General: He is not in acute distress.     Appearance: Normal appearance.   HENT:      Head: Normocephalic and atraumatic.      Nose: Nose normal.      Mouth/Throat:      Mouth: Mucous membranes are moist.   Eyes:      Pupils: Pupils are equal, round, and reactive to light.   Neck:      Vascular: No JVD.   Cardiovascular:      Rate and Rhythm: Normal rate and regular rhythm.      Pulses: Normal pulses.           Radial pulses are 2+ on the right side and 2+ on the left side.        Dorsalis pedis pulses are 2+ on the right side and 2+ on the " left side.   Pulmonary:      Effort: Pulmonary effort is normal. No respiratory distress.      Breath sounds: Normal breath sounds.   Abdominal:      Palpations: Abdomen is soft.      Tenderness: There is no abdominal tenderness.   Musculoskeletal:      Cervical back: Full passive range of motion without pain and neck supple.      Comments: No calf tenderness or swelling b/l   Skin:     General: Skin is warm.      Findings: No rash.   Neurological:      General: No focal deficit present.      Mental Status: He is alert. Mental status is at baseline.      Comments: Fluent speech, no acute lateralizing deficits   Psychiatric:         Mood and Affect: Mood normal.         Behavior: Behavior normal.         Results     LAB:  All pertinent labs reviewed and interpreted  Labs Ordered and Resulted from Time of ED Arrival to Time of ED Departure   BASIC METABOLIC PANEL - Abnormal       Result Value    Sodium 140      Potassium 3.9      Chloride 106      Carbon Dioxide (CO2) 22      Anion Gap 12      Urea Nitrogen 19.0      Creatinine 1.07      GFR Estimate 82      Calcium 8.9      Glucose 138 (*)    TROPONIN T, HIGH SENSITIVITY - Normal    Troponin T, High Sensitivity 9     HEPATIC FUNCTION PANEL - Normal    Protein Total 6.6      Albumin 4.3      Bilirubin Total 1.0      Alkaline Phosphatase 100      AST 25      ALT 39      Bilirubin Direct 0.23     MAGNESIUM - Normal    Magnesium 1.8     TSH WITH FREE T4 REFLEX - Normal    TSH 3.01     N TERMINAL PRO BNP OUTPATIENT - Normal    N Terminal Pro BNP Outpatient 42     CBC WITH PLATELETS AND DIFFERENTIAL    WBC Count 8.2      RBC Count 4.77      Hemoglobin 14.1      Hematocrit 41.5      MCV 87      MCH 29.6      MCHC 34.0      RDW 11.9      Platelet Count 229      % Neutrophils 53      % Lymphocytes 35      % Monocytes 7      % Eosinophils 4      % Basophils 1      % Immature Granulocytes 1      NRBCs per 100 WBC 0      Absolute Neutrophils 4.4      Absolute Lymphocytes 2.8       Absolute Monocytes 0.6      Absolute Eosinophils 0.3      Absolute Basophils 0.1      Absolute Immature Granulocytes 0.0      Absolute NRBCs 0.0         RADIOLOGY:  XR Chest 2 Views   Final Result   IMPRESSION: No focal consolidation, pleural effusion or pneumothorax. Cardiomediastinal silhouette is unremarkable.                   ECG:  NSR, rate 62, normal intervals, no acute ischemia    I have independently reviewed and interpreted the EKG(s) documented above     PROCEDURES:  Procedures:  none      FINAL IMPRESSION:    ICD-10-CM    1. Dyspnea, unspecified type  R06.00 Rapid [facility]  Referral      2. Other fatigue  R53.83           0 minutes of critical care time      I, Twan Brown , am serving as a scribe to document services personally performed by Dr. Mina Perez, based on my observations and the provider's statements to me. I, Mina Perez, DO attest that Twan Brown  is acting in a scribe capacity, has observed my performance of the services and has documented them in accordance with my direction.      Mina Perez DO  Emergency Medicine  [facility] EMERGENCY DEPARTMENT  7/16/2024  7:21 PM          Mina Perez MD  07/16/24 [id number]

## 2024-07-17 NOTE — DISCHARGE INSTRUCTIONS
I put in for cardiology to follow up with you through their rapid access cardiology clinic - they should be calling you for appointment.  Avoid heavy exertional activity until cardiology follow up. Return to the ER for worsening symptoms as we discussed.

## 2024-07-17 NOTE — ED TRIAGE NOTES
"Pt c/o chest pain (described as chest \"weirdness\"), shortness of breath with exertion, lightheadedness, and fatigue for past 2 weeks. Pt states he intermittently gets pain to both arms. Pt denies nausea, vomiting, and diarrhea. Pt has 5 stents.     Triage Assessment (Adult)       Row Name 07/16/24 8493          Triage Assessment    Airway WDL WDL        Respiratory WDL    Respiratory WDL X  SOB with exertion        Skin Circulation/Temperature WDL    Skin Circulation/Temperature WDL WDL        Cardiac WDL    Cardiac WDL X;chest pain        Peripheral/Neurovascular WDL    Peripheral Neurovascular WDL WDL        Cognitive/Neuro/Behavioral WDL    Cognitive/Neuro/Behavioral WDL WDL                     "

## 2024-07-18 ENCOUNTER — TELEPHONE (OUTPATIENT)
Dept: CARDIOLOGY | Facility: CLINIC | Age: 54
End: 2024-07-18
Payer: COMMERCIAL

## 2024-07-18 DIAGNOSIS — I25.83 CORONARY ARTERIOSCLEROSIS DUE TO LIPID RICH PLAQUE: ICD-10-CM

## 2024-07-18 DIAGNOSIS — Z95.5 S/P CORONARY ARTERY STENT PLACEMENT: ICD-10-CM

## 2024-07-18 DIAGNOSIS — Q23.81 BICUSPID AORTIC VALVE: Primary | ICD-10-CM

## 2024-07-18 DIAGNOSIS — I20.0 ACCELERATING ANGINA (H): ICD-10-CM

## 2024-07-18 NOTE — TELEPHONE ENCOUNTER
Health Call Center    Phone Message    May a detailed message be left on voicemail: yes     Reason for Call: Other: Taurus called requesting to speak with his care team about his care and wanting to keep them in the loop about what is going on. Please reach out to Taurus to discuss. Thank you!     Action Taken: Other: Cardiology    Travel Screening: Not Applicable    Thank you!  [facility]       Date of Service:

## 2024-07-18 NOTE — TELEPHONE ENCOUNTER
Called back Alec to address his concerns. He reports he was in the ED at [facility] 7/16/24 for atypical chest pain and increased dyspnea on exertion. They did labs + EKG and recommended that he be seen in [facility]- scheduled 7/31 with Dr. Dorantes.     Alec says that he has felt progressive dyspnea on exertion for the last few weeks and more fatigue. He has chronic angina at baseline, so he reports that it is difficult for him to be able to gauge if this has changed at all. He reached out to see if he should have any testing prior to [facility] visit given his hx of 5 stents. Will route to [facility] for review. -[facility]        Dr. Albarran,  See Alec's ED visit 7/16 + above. He reports fatigue + increased dyspnea on exertion over the last few weeks. He has his chronic angina, which he cannot tell if this is changed or not. He will be seen in [facility] with Dr. Dorantes 7/31. Do you want any testing prior to this?  Thanks,  Mal

## 2024-07-18 NOTE — TELEPHONE ENCOUNTER
----- Message -----  From: Bhumika Albarran MD  Sent: 7/18/2024   2:56 PM CDT  To: Zulma Parham RN    If possible can we repeat stress MRI, this would tell us that the aortic valve is doing as well as whether there is any ischemia or not.  LF        ==called Alec and updated on recommendation. Order placed and patient denies claustrophobia. Will route to  to see if we can try to get this before [facility] visit. [place] [place]

## 2024-07-19 LAB
ATRIAL RATE - MUSE: 62 BPM
DIASTOLIC BLOOD PRESSURE - MUSE: NORMAL MMHG
INTERPRETATION ECG - MUSE: NORMAL
P AXIS - MUSE: -14 DEGREES
PR INTERVAL - MUSE: 194 MS
QRS DURATION - MUSE: 86 MS
QT - MUSE: 430 MS
QTC - MUSE: 436 MS
R AXIS - MUSE: 29 DEGREES
SYSTOLIC BLOOD PRESSURE - MUSE: NORMAL MMHG
T AXIS - MUSE: 1 DEGREES
VENTRICULAR RATE- MUSE: 62 BPM

## 2024-07-23 NOTE — TELEPHONE ENCOUNTER
[facility] Call Center    Phone Message    May a detailed message be left on voicemail: yes     Reason for Call: Other: Pt called into clinic for an update on possibly moving testing up. Please review and call pt back to advise and further coordinate. Thank you!     Action Taken: Message routed to:  Other: [facility] HEART SCHEDULING [[id number]]    Travel Screening: Not Applicable     Date of Service:

## 2024-07-26 NOTE — TELEPHONE ENCOUNTER
[facility] Call Center    Phone Message    May a detailed message be left on voicemail: yes     Reason for Call: Other: Patient called wanting to speak with Zulma to follow up in regards to MRI and see if they are able to get it scheduled for some time next week. Patient stated they spoke with Zulma, but have not received any updates. Patient also will like to know if they are still needing to keep their appt on 07/31 with Dr. Dorantes. Please call patient back to further discuss.     Action Taken: Other: Cardiology    Travel Screening: Not Applicable     Thank you!  [facility]

## 2024-07-26 NOTE — TELEPHONE ENCOUNTER
LVM for pt to call clinic back.  Recommendation was to leave MRI as scheduled, and move RAC out past MRI.

## 2024-07-26 NOTE — TELEPHONE ENCOUNTER
Spoke to pt - pt will keep MRI appt with Janice as is, and would like to push out his R[facility] appt.  Offered 8/16/24 at  with Dr Albarran.    Fishing trip scheduled for 8/2/24 - if pt unable to have MRI prior he will cancel fishing trip.

## 2024-07-29 ENCOUNTER — TELEPHONE (OUTPATIENT)
Dept: CARDIOLOGY | Facility: CLINIC | Age: 54
End: 2024-07-29
Payer: COMMERCIAL

## 2024-07-29 NOTE — TELEPHONE ENCOUNTER
7/29/24 informed there is no avail sooner then 8/7/24 his current MRI pt aware- pt req for [place] # to call and check as well--CHRISTIAN notes that pt was recently positive COVID check his positive date and guidelines and protocol as needed.    Luis

## 2024-07-29 NOTE — TELEPHONE ENCOUNTER
Health Call Center    Phone Message    May a detailed message be left on voicemail: yes     Reason for Call: Other: Alec called requesting to see if there is a sooner appt for his Stress MRI. Alec was instructed to call back and check if any appts had opened up. Please reach out to Alec to discuss. Thank you!     Action Taken: Other: Cardiology    Travel Screening: Not Applicable    Thank you!  [facility]       Date of Service:

## 2024-08-07 ENCOUNTER — HOSPITAL ENCOUNTER (OUTPATIENT)
Dept: CARDIOLOGY | Facility: CLINIC | Age: 54
Discharge: HOME | End: 2024-08-07
Attending: INTERNAL MEDICINE | Admitting: INTERNAL MEDICINE
Payer: COMMERCIAL

## 2024-08-07 VITALS — HEART RATE: 76 BPM | DIASTOLIC BLOOD PRESSURE: 68 MMHG | SYSTOLIC BLOOD PRESSURE: 117 MMHG

## 2024-08-07 DIAGNOSIS — R06.09 DOE (DYSPNEA ON EXERTION): Primary | ICD-10-CM

## 2024-08-07 DIAGNOSIS — R00.1 SINUS BRADYCARDIA: ICD-10-CM

## 2024-08-07 DIAGNOSIS — Q23.81 BICUSPID AORTIC VALVE: ICD-10-CM

## 2024-08-07 DIAGNOSIS — E78.2 MIXED HYPERLIPIDEMIA: ICD-10-CM

## 2024-08-07 DIAGNOSIS — Z95.5 S/P CORONARY ARTERY STENT PLACEMENT: ICD-10-CM

## 2024-08-07 DIAGNOSIS — I20.0 ACCELERATING ANGINA (H): ICD-10-CM

## 2024-08-07 DIAGNOSIS — I25.83 CORONARY ARTERIOSCLEROSIS DUE TO LIPID RICH PLAQUE: ICD-10-CM

## 2024-08-07 DIAGNOSIS — I10 ESSENTIAL HYPERTENSION: ICD-10-CM

## 2024-08-07 PROCEDURE — 93018 CV STRESS TEST I&R ONLY: CPT | Performed by: INTERNAL MEDICINE

## 2024-08-07 PROCEDURE — 255N000002 HC RX 255 OP 636: Performed by: INTERNAL MEDICINE

## 2024-08-07 PROCEDURE — 75563 CARD MRI W/STRESS IMG & DYE: CPT | Mod: 26 | Performed by: INTERNAL MEDICINE

## 2024-08-07 PROCEDURE — 93016 CV STRESS TEST SUPVJ ONLY: CPT | Performed by: INTERNAL MEDICINE

## 2024-08-07 PROCEDURE — 250N000011 HC RX IP 250 OP 636: Performed by: INTERNAL MEDICINE

## 2024-08-07 PROCEDURE — 75563 CARD MRI W/STRESS IMG & DYE: CPT

## 2024-08-07 PROCEDURE — A9585 GADOBUTROL INJECTION: HCPCS | Performed by: INTERNAL MEDICINE

## 2024-08-07 RX ORDER — LORAZEPAM 0.5 MG/1
0.5 TABLET ORAL EVERY 10 MIN PRN
Status: DISCONTINUED | OUTPATIENT
Start: 2024-08-07 | End: 2024-08-08 | Stop reason: HOSPADM

## 2024-08-07 RX ORDER — REGADENOSON 0.08 MG/ML
0.4 INJECTION, SOLUTION INTRAVENOUS ONCE
Status: COMPLETED | OUTPATIENT
Start: 2024-08-07 | End: 2024-08-07

## 2024-08-07 RX ORDER — CAFFEINE 200 MG
200 TABLET ORAL
Status: SHIPPED | OUTPATIENT
Start: 2024-08-07 | End: 2024-08-07

## 2024-08-07 RX ORDER — GADOBUTROL 604.72 MG/ML
26 INJECTION INTRAVENOUS ONCE
Status: COMPLETED | OUTPATIENT
Start: 2024-08-07 | End: 2024-08-07

## 2024-08-07 RX ORDER — DIAZEPAM 5 MG
5 TABLET ORAL EVERY 30 MIN PRN
Status: DISCONTINUED | OUTPATIENT
Start: 2024-08-07 | End: 2024-08-08 | Stop reason: HOSPADM

## 2024-08-07 RX ORDER — CAFFEINE CITRATE 20 MG/ML
60 SOLUTION INTRAVENOUS
Status: ACTIVE | OUTPATIENT
Start: 2024-08-07 | End: 2024-08-07

## 2024-08-07 RX ORDER — SODIUM CHLORIDE 9 MG/ML
INJECTION, SOLUTION INTRAVENOUS CONTINUOUS
Status: ACTIVE | OUTPATIENT
Start: 2024-08-07 | End: 2024-08-07

## 2024-08-07 RX ORDER — ALBUTEROL SULFATE 90 UG/1
2 AEROSOL, METERED RESPIRATORY (INHALATION) EVERY 5 MIN PRN
Status: DISCONTINUED | OUTPATIENT
Start: 2024-08-07 | End: 2024-08-08 | Stop reason: HOSPADM

## 2024-08-07 RX ORDER — AMINOPHYLLINE 25 MG/ML
50-100 INJECTION, SOLUTION INTRAVENOUS
Status: DISCONTINUED | OUTPATIENT
Start: 2024-08-07 | End: 2024-08-08 | Stop reason: HOSPADM

## 2024-08-07 RX ORDER — NITROGLYCERIN 0.4 MG/1
0.4 TABLET SUBLINGUAL EVERY 5 MIN PRN
Status: ACTIVE | OUTPATIENT
Start: 2024-08-07 | End: 2024-08-07

## 2024-08-07 RX ORDER — LIDOCAINE 40 MG/G
CREAM TOPICAL
Status: DISCONTINUED | OUTPATIENT
Start: 2024-08-07 | End: 2024-08-08 | Stop reason: HOSPADM

## 2024-08-07 RX ADMIN — GADOBUTROL 26 ML: 604.72 INJECTION INTRAVENOUS at 15:34

## 2024-08-07 RX ADMIN — REGADENOSON 0.4 MG: 0.08 INJECTION, SOLUTION INTRAVENOUS at 15:07

## 2024-08-09 ENCOUNTER — TELEPHONE (OUTPATIENT)
Dept: CARDIOLOGY | Facility: CLINIC | Age: 54
End: 2024-08-09
Payer: COMMERCIAL

## 2024-08-09 NOTE — TELEPHONE ENCOUNTER
[facility] Call Center    Phone Message    May a detailed message be left on voicemail: yes     Reason for Call: Other: Alec called requesting an update on his MRI results. Informed Alec his results are still in process. Please reach out to Alec as soon as his results are available. Alec states he is starting to get a little nervous about his results. Thank you!     Action Taken: Other: Cardiology    Travel Screening: Not Applicable    Thank you!  [facility]       Date of Service:

## 2024-08-12 NOTE — TELEPHONE ENCOUNTER
Noted- message to patient regarding how stress mri's can take longer to read based on speciality training and specific readers etc. -demetri

## 2024-08-16 ENCOUNTER — OFFICE VISIT (OUTPATIENT)
Dept: CARDIOLOGY | Facility: CLINIC | Age: 54
End: 2024-08-16
Payer: COMMERCIAL

## 2024-08-16 VITALS
WEIGHT: 229 LBS | BODY MASS INDEX: 32.86 KG/M2 | HEART RATE: 61 BPM | DIASTOLIC BLOOD PRESSURE: 80 MMHG | SYSTOLIC BLOOD PRESSURE: 120 MMHG | RESPIRATION RATE: 14 BRPM

## 2024-08-16 DIAGNOSIS — G47.33 OSA (OBSTRUCTIVE SLEEP APNEA): ICD-10-CM

## 2024-08-16 DIAGNOSIS — I25.83 CORONARY ARTERIOSCLEROSIS DUE TO LIPID RICH PLAQUE: Primary | ICD-10-CM

## 2024-08-16 DIAGNOSIS — E66.09 CLASS 1 OBESITY DUE TO EXCESS CALORIES WITHOUT SERIOUS COMORBIDITY WITH BODY MASS INDEX (BMI) OF 32.0 TO 32.9 IN ADULT: ICD-10-CM

## 2024-08-16 DIAGNOSIS — Q23.81 BICUSPID AORTIC VALVE: ICD-10-CM

## 2024-08-16 DIAGNOSIS — I10 BENIGN ESSENTIAL HYPERTENSION: ICD-10-CM

## 2024-08-16 DIAGNOSIS — R06.09 DOE (DYSPNEA ON EXERTION): ICD-10-CM

## 2024-08-16 DIAGNOSIS — E66.811 CLASS 1 OBESITY DUE TO EXCESS CALORIES WITHOUT SERIOUS COMORBIDITY WITH BODY MASS INDEX (BMI) OF 32.0 TO 32.9 IN ADULT: ICD-10-CM

## 2024-08-16 DIAGNOSIS — E78.2 MIXED HYPERLIPIDEMIA: ICD-10-CM

## 2024-08-16 PROCEDURE — G2211 COMPLEX E/M VISIT ADD ON: HCPCS | Performed by: INTERNAL MEDICINE

## 2024-08-16 PROCEDURE — 99214 OFFICE O/P EST MOD 30 MIN: CPT | Performed by: INTERNAL MEDICINE

## 2024-08-16 NOTE — LETTER
8/16/2024    Nita Ratliff MD  [address] [facility] [address]    RE: Taurus Cotto       Dear Colleague,     I had the pleasure of seeing Taurus Cotto in the [facility].      [facility]  Heart Care Clinic Follow-up Note    Assessment & Plan          (I25.10,  I25.83) Coronary arteriosclerosis due to lipid rich plaque  (primary encounter diagnosis)  Comment: Given increased chest discomfort he underwent angiography October 17, 2022 showing normal left main, mid LAD in-stent 10% restenosis, circumflex with mid 50% stenosis and the right coronary artery with a 70% proximal to mid stenosis that was significant by FFR and received Synergy AUREA 3.5 x 38 mm and 3.5 x 12 mm stents.  Stress MRI shows no significant ischemia, his symptoms have improved, plan would be to continue Effient until October 2024 at which point time we will discontinue it.    (Q23.1) Bicuspid aortic valve  Comment: Based on echo from January 2020 fusion of right and left coronary cusps, dimensionless index 0.5 with a valve area 1.6 cm , mean gradient of 9 mmHg, peak gradient of 16 mmHg mean velocity 1.4 m/s.  Most recent echo however read as tricuspid with aortic insufficiency +1, dimensionless index 0.6 with mean gradient of 10 and peak gradient of 16 with mean velocity 2.0 m/s.  Most recent MRI does not fact confirm it is bicuspid, no significant stenosis, recheck echo in a year.    (I10) Essential hypertension  Comment: Under good control currently on metoprolol.     (E78.2) Mixed hyperlipidemia  Comment: Excellent total cholesterol of 125 with an LDL of 66.  He also has elevated LP(a) of 75.       (G47.33) IDA (obstructive sleep apnea)  Comment: Never was really tolerant of CPAP.  Might consider retesting now that he has lost weight.  I have also asked him to check with his primary about possible oral appliances.     (R06.09) SAL (dyspnea on exertion)  Comment: Normal hemoglobin, normal BNP, normal troponin,  normal stress MRI, no significant aortic stenosis, suspect possibly due to obesity, physical lack of training, possibly sleep apnea.  I have asked him to work on weight loss, physical training, wear his CPAP or oral appliance, if no better we will try Isordil 10 mg p.o. 3 times daily but need to watch out with his Cialis.    (E66.09,  Z68.32) Class 1 obesity due to excess calories without serious comorbidity with body mass index (BMI) of 32.0 to 32.9 in adult  Comment: Agree to continue working on weight loss.    Plan  1.  Work on weight loss.  2.  Try exercise training, weight loss, CPAP, if no better call us within a month and we will try Isordil 10 mg by mouth 3 times daily in addition to Ranexa but need to watch with Cialis.  3.  Follow-up with me in about 3 to 4 months or sooner if needed.    The longitudinal plan of care for the diagnosis(es)/condition(s) as documented were addressed during this visit. Due to the added complexity in care, I will continue to support Taurus in the subsequent management and with ongoing continuity of care.     Subjective  CC: 54-year-old white gentleman being seen in follow-up.  Since I saw him was in the emergency department secondary to chest discomfort or shortness of breath with a negative workup.  He underwent stress MRI showing no significant abnormality.  He is in today, tell me he is generally fatigued, does not with the shortness of breath and heavy activity but it is better than it has been.  Does not do to a chest discomfort, pain/pressure that comes on at rest lasting for a minute or less radiating to left shoulder.  There is no significant effort related angina, shortness of breath, PND, orthopnea, syncope, dizziness or peripheral edema.    Medications  Current Outpatient Medications   Medication Sig Dispense Refill     aspirin (ASA) 81 MG EC tablet TAKE 1 TABLET (81 MG) BY MOUTH DAILY START TOMORROW. 90 tablet 2     atorvastatin (LIPITOR) 80 MG tablet Take 1 tablet  (80 mg) by mouth daily 90 tablet 3     buPROPion (WELLBUTRIN SR) 100 MG 12 hr tablet Take 100 mg by mouth daily       melatonin 3 MG tablet as needed       metoprolol succinate ER (TOPROL XL) 25 MG 24 hr tablet TAKE 0.5 TABLETS BY MOUTH 2 TIMES DAILY. 90 tablet 3     omeprazole (PRILOSEC) 20 MG capsule [OMEPRAZOLE (PRILOSEC) 20 MG CAPSULE] Take 1 capsule (20 mg total) by mouth at bedtime. 30 capsule 0     polyvinyl alcohol/povidone (CLEAR EYES NATURAL TEARS OPHT) [POLYVINYL ALCOHOL/POVIDONE (CLEAR EYES NATURAL TEARS OPHT)] Apply 2 drops to eye 3 (three) times a day as needed.       prasugrel (EFFIENT) 10 MG TABS tablet Take 1 tablet (10 mg) by mouth daily 90 tablet 3     ranolazine (RANEXA) 500 MG 12 hr tablet Take 1 tablet (500 mg) by mouth 2 times daily 180 tablet 3     sodium chloride (OCEAN) 0.65 % nasal spray [SODIUM CHLORIDE (OCEAN) 0.65 % NASAL SPRAY] Apply 2 sprays into each nostril as needed for congestion.       tadalafil (CIALIS) 20 MG tablet Take 1 tablet (20 mg) by mouth daily as needed 30 tablet 0       Objective  /80 (BP Location: Right arm, Patient Position: Sitting, Cuff Size: Adult Large)   Pulse 61   Resp 14   Wt 103.9 kg (229 lb)   BMI 32.86 kg/m      General Appearance:    Alert, cooperative, no distress, appears stated age   Head:    Normocephalic, without obvious abnormality, atraumatic   Throat:   Lips, mucosa, and tongue normal; teeth and gums normal   Neck:   Supple, symmetrical, trachea midline, no adenopathy;        thyroid:  No enlargement/tenderness/nodules; no carotid    bruit or JVD   Back:     Symmetric, no curvature, ROM normal, no CVA tenderness   Lungs:     Clear to auscultation bilaterally, respirations unlabored   Chest wall:    No tenderness or deformity   Heart:    Regular rate and rhythm, S1 and S2 normal, 1/6 systolic ejection murmur, no rub   or gallop   Abdomen:     Soft, non-tender, bowel sounds active all four quadrants,     no masses, no organomegaly  "  Extremities:   Normal, atraumatic, no cyanosis or edema   Pulses:   2+ and symmetric all extremities   Skin:   Skin color, texture, turgor normal, no rashes or lesions     Results    Lab Results personally reviewed   Lab Results   Component Value Date    CHOL 125 07/11/2023    CHOL 95 10/17/2022     Lab Results   Component Value Date    HDL 37 (L) 07/11/2023    HDL 37 (L) 10/17/2022     No components found for: \"LDLCALC\"  Lab Results   Component Value Date    TRIG 110 07/11/2023    TRIG 79 10/17/2022     Lab Results   Component Value Date    WBC 8.2 07/16/2024    HGB 14.1 07/16/2024    HCT 41.5 07/16/2024     07/16/2024     Lab Results   Component Value Date    BUN 19.0 07/16/2024     07/16/2024    CO2 22 07/16/2024             Thank you for allowing me to participate in the care of your patient.      Sincerely,     WILLAM MARTÍNEZ MD     [facility]  cc:   Mina Perez MD  [address] [place] [address]      "

## 2024-08-16 NOTE — PATIENT INSTRUCTIONS
Taurus ALLAN Cotto,  I enjoyed visiting with you again today.  I am concerned with the breathing and chest pains.  Per our conversation all looks good with ER visit and stress MRI.  Let's try backing into work out and cpap and if no improvement in 4 weeks call us to try a long acting nitrate. Call me at [phone].  I will plan on seeing you 3-4 months.  Masoud Albarran

## 2024-08-16 NOTE — PROGRESS NOTES
[facility]  Heart Care Clinic Follow-up Note    Assessment & Plan          (I25.10,  I25.83) Coronary arteriosclerosis due to lipid rich plaque  (primary encounter diagnosis)  Comment: Given increased chest discomfort he underwent angiography October 17, 2022 showing normal left main, mid LAD in-stent 10% restenosis, circumflex with mid 50% stenosis and the right coronary artery with a 70% proximal to mid stenosis that was significant by FFR and received Synergy AUREA 3.5 x 38 mm and 3.5 x 12 mm stents.  Stress MRI shows no significant ischemia, his symptoms have improved, plan would be to continue Effient until October 2024 at which point time we will discontinue it.    (Q23.1) Bicuspid aortic valve  Comment: Based on echo from January 2020 fusion of right and left coronary cusps, dimensionless index 0.5 with a valve area 1.6 cm , mean gradient of 9 mmHg, peak gradient of 16 mmHg mean velocity 1.4 m/s.  Most recent echo however read as tricuspid with aortic insufficiency +1, dimensionless index 0.6 with mean gradient of 10 and peak gradient of 16 with mean velocity 2.0 m/s.  Most recent MRI does not fact confirm it is bicuspid, no significant stenosis, recheck echo in a year.    (I10) Essential hypertension  Comment: Under good control currently on metoprolol.     (E78.2) Mixed hyperlipidemia  Comment: Excellent total cholesterol of 125 with an LDL of 66.  He also has elevated LP(a) of 75.       (G47.33) IDA (obstructive sleep apnea)  Comment: Never was really tolerant of CPAP.  Might consider retesting now that he has lost weight.  I have also asked him to check with his primary about possible oral appliances.     (R06.09) SAL (dyspnea on exertion)  Comment: Normal hemoglobin, normal BNP, normal troponin, normal stress MRI, no significant aortic stenosis, suspect possibly due to obesity, physical lack of training, possibly sleep apnea.  I have asked him to work on weight loss, physical training, wear his  CPAP or oral appliance, if no better we will try Isordil 10 mg p.o. 3 times daily but need to watch out with his Cialis.    (E66.09,  Z68.32) Class 1 obesity due to excess calories without serious comorbidity with body mass index (BMI) of 32.0 to 32.9 in adult  Comment: Agree to continue working on weight loss.    Plan  1.  Work on weight loss.  2.  Try exercise training, weight loss, CPAP, if no better call us within a month and we will try Isordil 10 mg by mouth 3 times daily in addition to Ranexa but need to watch with Cialis.  3.  Follow-up with me in about 3 to 4 months or sooner if needed.    The longitudinal plan of care for the diagnosis(es)/condition(s) as documented were addressed during this visit. Due to the added complexity in care, I will continue to support Taurus in the subsequent management and with ongoing continuity of care.     Subjective  CC: 54-year-old white gentleman being seen in follow-up.  Since I saw him was in the emergency department secondary to chest discomfort or shortness of breath with a negative workup.  He underwent stress MRI showing no significant abnormality.  He is in today, tell me he is generally fatigued, does not with the shortness of breath and heavy activity but it is better than it has been.  Does not do to a chest discomfort, pain/pressure that comes on at rest lasting for a minute or less radiating to left shoulder.  There is no significant effort related angina, shortness of breath, PND, orthopnea, syncope, dizziness or peripheral edema.    Medications  Current Outpatient Medications   Medication Sig Dispense Refill    aspirin (ASA) 81 MG EC tablet TAKE 1 TABLET (81 MG) BY MOUTH DAILY START TOMORROW. 90 tablet 2    atorvastatin (LIPITOR) 80 MG tablet Take 1 tablet (80 mg) by mouth daily 90 tablet 3    buPROPion (WELLBUTRIN SR) 100 MG 12 hr tablet Take 100 mg by mouth daily      melatonin 3 MG tablet as needed      metoprolol succinate ER (TOPROL XL) 25 MG 24 hr  tablet TAKE 0.5 TABLETS BY MOUTH 2 TIMES DAILY. 90 tablet 3    omeprazole (PRILOSEC) 20 MG capsule [OMEPRAZOLE (PRILOSEC) 20 MG CAPSULE] Take 1 capsule (20 mg total) by mouth at bedtime. 30 capsule 0    polyvinyl alcohol/povidone (CLEAR EYES NATURAL TEARS OPHT) [POLYVINYL ALCOHOL/POVIDONE (CLEAR EYES NATURAL TEARS OPHT)] Apply 2 drops to eye 3 (three) times a day as needed.      prasugrel (EFFIENT) 10 MG TABS tablet Take 1 tablet (10 mg) by mouth daily 90 tablet 3    ranolazine (RANEXA) 500 MG 12 hr tablet Take 1 tablet (500 mg) by mouth 2 times daily 180 tablet 3    sodium chloride (OCEAN) 0.65 % nasal spray [SODIUM CHLORIDE (OCEAN) 0.65 % NASAL SPRAY] Apply 2 sprays into each nostril as needed for congestion.      tadalafil (CIALIS) 20 MG tablet Take 1 tablet (20 mg) by mouth daily as needed 30 tablet 0       Objective  /80 (BP Location: Right arm, Patient Position: Sitting, Cuff Size: Adult Large)   Pulse 61   Resp 14   Wt 103.9 kg (229 lb)   BMI 32.86 kg/m      General Appearance:    Alert, cooperative, no distress, appears stated age   Head:    Normocephalic, without obvious abnormality, atraumatic   Throat:   Lips, mucosa, and tongue normal; teeth and gums normal   Neck:   Supple, symmetrical, trachea midline, no adenopathy;        thyroid:  No enlargement/tenderness/nodules; no carotid    bruit or JVD   Back:     Symmetric, no curvature, ROM normal, no CVA tenderness   Lungs:     Clear to auscultation bilaterally, respirations unlabored   Chest wall:    No tenderness or deformity   Heart:    Regular rate and rhythm, S1 and S2 normal, 1/6 systolic ejection murmur, no rub   or gallop   Abdomen:     Soft, non-tender, bowel sounds active all four quadrants,     no masses, no organomegaly   Extremities:   Normal, atraumatic, no cyanosis or edema   Pulses:   2+ and symmetric all extremities   Skin:   Skin color, texture, turgor normal, no rashes or lesions     Results    Lab Results personally reviewed  "  Lab Results   Component Value Date    CHOL 125 07/11/2023    CHOL 95 10/17/2022     Lab Results   Component Value Date    HDL 37 (L) 07/11/2023    HDL 37 (L) 10/17/2022     No components found for: \"LDLCALC\"  Lab Results   Component Value Date    TRIG 110 07/11/2023    TRIG 79 10/17/2022     Lab Results   Component Value Date    WBC 8.2 07/16/2024    HGB 14.1 07/16/2024    HCT 41.5 07/16/2024     07/16/2024     Lab Results   Component Value Date    BUN 19.0 07/16/2024     07/16/2024    CO2 22 07/16/2024         "

## 2024-11-04 ENCOUNTER — TRANSCRIBE ORDERS (OUTPATIENT)
Dept: OTHER | Age: 54
End: 2024-11-04

## 2024-11-04 DIAGNOSIS — G47.33 OSA (OBSTRUCTIVE SLEEP APNEA): Primary | ICD-10-CM

## 2024-11-20 ENCOUNTER — OFFICE VISIT (OUTPATIENT)
Dept: CARDIOLOGY | Facility: CLINIC | Age: 54
End: 2024-11-20
Payer: COMMERCIAL

## 2024-11-20 VITALS
HEART RATE: 63 BPM | RESPIRATION RATE: 14 BRPM | BODY MASS INDEX: 33.86 KG/M2 | SYSTOLIC BLOOD PRESSURE: 136 MMHG | DIASTOLIC BLOOD PRESSURE: 84 MMHG | WEIGHT: 236 LBS

## 2024-11-20 DIAGNOSIS — E66.811 CLASS 1 OBESITY DUE TO EXCESS CALORIES WITHOUT SERIOUS COMORBIDITY WITH BODY MASS INDEX (BMI) OF 32.0 TO 32.9 IN ADULT: ICD-10-CM

## 2024-11-20 DIAGNOSIS — E78.2 MIXED HYPERLIPIDEMIA: ICD-10-CM

## 2024-11-20 DIAGNOSIS — I25.83 CORONARY ARTERIOSCLEROSIS DUE TO LIPID RICH PLAQUE: Primary | ICD-10-CM

## 2024-11-20 DIAGNOSIS — R06.09 DOE (DYSPNEA ON EXERTION): ICD-10-CM

## 2024-11-20 DIAGNOSIS — E66.09 CLASS 1 OBESITY DUE TO EXCESS CALORIES WITHOUT SERIOUS COMORBIDITY WITH BODY MASS INDEX (BMI) OF 32.0 TO 32.9 IN ADULT: ICD-10-CM

## 2024-11-20 DIAGNOSIS — I10 BENIGN ESSENTIAL HYPERTENSION: ICD-10-CM

## 2024-11-20 DIAGNOSIS — Q23.81 BICUSPID AORTIC VALVE: ICD-10-CM

## 2024-11-20 DIAGNOSIS — G47.33 OSA (OBSTRUCTIVE SLEEP APNEA): ICD-10-CM

## 2024-11-20 LAB
CHOLEST SERPL-MCNC: 122 MG/DL
FASTING STATUS PATIENT QL REPORTED: YES
HDLC SERPL-MCNC: 39 MG/DL
LDLC SERPL CALC-MCNC: 67 MG/DL
NONHDLC SERPL-MCNC: 83 MG/DL
TRIGL SERPL-MCNC: 82 MG/DL

## 2024-11-20 PROCEDURE — 80061 LIPID PANEL: CPT | Performed by: INTERNAL MEDICINE

## 2024-11-20 PROCEDURE — 36415 COLL VENOUS BLD VENIPUNCTURE: CPT | Performed by: INTERNAL MEDICINE

## 2024-11-20 PROCEDURE — G2211 COMPLEX E/M VISIT ADD ON: HCPCS | Performed by: INTERNAL MEDICINE

## 2024-11-20 PROCEDURE — 99214 OFFICE O/P EST MOD 30 MIN: CPT | Performed by: INTERNAL MEDICINE

## 2024-11-20 RX ORDER — ISOSORBIDE DINITRATE 10 MG/1
10 TABLET ORAL
Qty: 21 TABLET | Refills: 2 | Status: SHIPPED | OUTPATIENT
Start: 2024-11-20

## 2024-11-20 NOTE — LETTER
November 20, 2024    Taurus LEMUS Yadiel  [address] [facility] [address]    Dear ,    We are writing to inform you of your test results.  Nice to see you again,.  Your cholesterol numbers which are excellent, keep up the good work.    Resulted Orders   Lipid panel reflex to direct LDL Fasting   Result Value Ref Range    Cholesterol 122 <200 mg/dL    Triglycerides 82 <150 mg/dL    Direct Measure HDL 39 (L) >=40 mg/dL    LDL Cholesterol Calculated 67 <100 mg/dL    Non HDL Cholesterol 83 <130 mg/dL    Patient Fasting > 8hrs? Yes     Narrative       If you have any questions or concerns, please call the clinic at the number listed above.       Sincerely,      Bhumika Albarran MD

## 2024-11-20 NOTE — PATIENT INSTRUCTIONS
Mr Taurus Cotto,  I enjoyed visiting with you again today.  I am sorry to hear of the shortness of breath, but at least being able to function out in the yard and do chores around the house is comforting especially with our negative workup.  Per our conversation let us try the Isordil 10 mg by mouth 3 times a day, make sure you do not take it within 24 hours of the Cialis.  If it works with lessened shortness of breath give us a call at [phone] and we will get you a 90-day supply.  I will plan on checking lipids today.  We will send results electronically.  I will plan on seeing you 1 year or sooner if needed.  I will plan on repeating the echo in May 2025, a year out from the most recent visualization of the valves, the MRI.  Masoud Albarran

## 2024-11-20 NOTE — LETTER
11/20/2024    Nita Ratliff MD  [address]  [place] [address]    RE: Taurus Cotto       Dear Colleague,     I had the pleasure of seeing Taurus Cotto in the [facility].      [facility]  Heart Care Clinic Follow-up Note    Assessment & Plan        (I25.10,  I25.83) Coronary arteriosclerosis due to lipid rich plaque  (primary encounter diagnosis)  Comment: Given increased chest discomfort he underwent angiography October 17, 2022 showing normal left main, mid LAD in-stent 10% restenosis, circumflex with mid 50% stenosis and the right coronary artery with a 70% proximal to mid stenosis that was significant by FFR and received Synergy AUREA 3.5 x 38 mm and 3.5 x 12 mm stents.  Stress MRI shows no significant ischemia, his symptoms have improved, off Effient and only on aspirin.    (Q23.1) Bicuspid aortic valve  Comment: Based on echo from January 2020 fusion of right and left coronary cusps, dimensionless index 0.5 with a valve area 1.6 cm , mean gradient of 9 mmHg, peak gradient of 16 mmHg mean velocity 1.4 m/s. Most recent echo with aortic insufficiency +1, dimensionless index 0.6 with mean gradient of 10 and peak gradient of 16 with mean velocity 2.0 m/s. Most recent MRI does confirm it is bicuspid, no significant stenosis, recheck echo in a year.     (I10) Essential hypertension  Comment: Under good control currently on metoprolol.     (E78.2) Mixed hyperlipidemia  Comment: Excellent total cholesterol of 125 with an LDL of 66.  He also has elevated LP(a) of 75.  Will check fasting lipids today.     (G47.33) IDA (obstructive sleep apnea)  Comment: Never was really tolerant of CPAP.  He has repeat sleep study set up in February 2025.    (R06.09) SAL (dyspnea on exertion)  Comment: Normal hemoglobin, normal BNP, normal troponin, normal stress MRI, no significant aortic stenosis, suspect possibly due to obesity, physical lack of training, possibly sleep apnea.  Will try Isordil 10  mg p.o. 3 times daily but need to watch out with his Cialis.     (E66.09,  Z68.32) Class 1 obesity due to excess calories without serious comorbidity with body mass index (BMI) of 32.0 to 32.9 in adult  Comment: Agree to continue working on weight loss.     Plan  1.  Fasting lipid profile.  2.  Work on weight loss.  3.  Repeat echo May 2025.  4.  Sleep study February 2025.  5.  Trial Isordil 10 mg p.o. 3 times daily and or schedule for 90-day supply.  6.  Follow-up with me in a year or sooner if needed.    The longitudinal plan of care for the diagnosis(es)/condition(s) as documented were addressed during this visit. Due to the added complexity in care, I will continue to support Taurus in the subsequent management and with ongoing continuity of care.     Subjective  CC: 54-year-old white gentleman here for follow-up visit.  Still tired all the time, still tells me when he goes up a flight of steps he does get a little winded but better than it has been.  He is able to function outside without any significant shortness of breath doing gardening.  No significant syncope, presyncope, chest pains, palpitations, PND, orthopnea or peripheral edema.    Medications  Current Outpatient Medications   Medication Sig Dispense Refill     aspirin (ASA) 81 MG EC tablet TAKE 1 TABLET (81 MG) BY MOUTH DAILY START TOMORROW. 90 tablet 2     atorvastatin (LIPITOR) 80 MG tablet Take 1 tablet (80 mg) by mouth daily 90 tablet 3     buPROPion (WELLBUTRIN SR) 100 MG 12 hr tablet Take 100 mg by mouth daily       isosorbide dinitrate (ISORDIL) 10 MG tablet Take 1 tablet (10 mg) by mouth 3 times daily. 21 tablet 2     melatonin 3 MG tablet as needed       metoprolol succinate ER (TOPROL XL) 25 MG 24 hr tablet TAKE 0.5 TABLETS BY MOUTH 2 TIMES DAILY. 90 tablet 3     omeprazole (PRILOSEC) 20 MG capsule [OMEPRAZOLE (PRILOSEC) 20 MG CAPSULE] Take 1 capsule (20 mg total) by mouth at bedtime. 30 capsule 0     polyvinyl alcohol/povidone (CLEAR EYES  "NATURAL TEARS OPHT) [POLYVINYL ALCOHOL/POVIDONE (CLEAR EYES NATURAL TEARS OPHT)] Apply 2 drops to eye 3 (three) times a day as needed.       ranolazine (RANEXA) 500 MG 12 hr tablet Take 1 tablet (500 mg) by mouth 2 times daily 180 tablet 3     sodium chloride (OCEAN) 0.65 % nasal spray [SODIUM CHLORIDE (OCEAN) 0.65 % NASAL SPRAY] Apply 2 sprays into each nostril as needed for congestion.       tadalafil (CIALIS) 20 MG tablet Take 1 tablet (20 mg) by mouth daily as needed 30 tablet 0       Objective  /84 (BP Location: Right arm, Patient Position: Sitting, Cuff Size: Adult Regular)   Pulse 63   Resp 14   Wt 107 kg (236 lb)   BMI 33.86 kg/m      General Appearance:    Alert, cooperative, no distress, appears stated age   Head:    Normocephalic, without obvious abnormality, atraumatic   Throat:   Lips, mucosa, and tongue normal; teeth and gums normal   Neck:   Supple, symmetrical, trachea midline, no adenopathy;        thyroid:  No enlargement/tenderness/nodules; no carotid    bruit or JVD   Back:     Symmetric, no curvature, ROM normal, no CVA tenderness   Lungs:     Clear to auscultation bilaterally, respirations unlabored   Chest wall:    No tenderness or deformity   Heart:    Regular rate and rhythm, S1 and S2 normal, no murmur, rub   or gallop   Abdomen:     Soft, non-tender, bowel sounds active all four quadrants,     no masses, no organomegaly   Extremities:   Normal, atraumatic, no cyanosis or edema   Pulses:   2+ and symmetric all extremities   Skin:   Skin color, texture, turgor normal, no rashes or lesions     Results    Lab Results personally reviewed   Lab Results   Component Value Date    CHOL 125 07/11/2023    CHOL 95 10/17/2022     Lab Results   Component Value Date    HDL 37 (L) 07/11/2023    HDL 37 (L) 10/17/2022     No components found for: \"LDLCALC\"  Lab Results   Component Value Date    TRIG 110 07/11/2023    TRIG 79 10/17/2022     Lab Results   Component Value Date    WBC 8.2 07/16/2024 "    HGB 14.1 07/16/2024    HCT 41.5 07/16/2024     07/16/2024     Lab Results   Component Value Date    BUN 19.0 07/16/2024     07/16/2024    CO2 22 07/16/2024       Reviewed electrocardiogram normal sinus rhythm, within normal limits.              Thank you for allowing me to participate in the care of your patient.      Sincerely,     WILLAM ALBARRAN MD     [facility]  cc:   Bhumika Albarran MD  [address] [facility], [address]  [place] [address]

## 2024-11-20 NOTE — PROGRESS NOTES
[facility] Follow-up Note    Assessment & Plan        (I25.10,  I25.83) Coronary arteriosclerosis due to lipid rich plaque  (primary encounter diagnosis)  Comment: Given increased chest discomfort he underwent angiography October 17, 2022 showing normal left main, mid LAD in-stent 10% restenosis, circumflex with mid 50% stenosis and the right coronary artery with a 70% proximal to mid stenosis that was significant by FFR and received Synergy AUREA 3.5 x 38 mm and 3.5 x 12 mm stents.  Stress MRI shows no significant ischemia, his symptoms have improved, off Effient and only on aspirin.    (Q23.1) Bicuspid aortic valve  Comment: Based on echo from January 2020 fusion of right and left coronary cusps, dimensionless index 0.5 with a valve area 1.6 cm , mean gradient of 9 mmHg, peak gradient of 16 mmHg mean velocity 1.4 m/s. Most recent echo with aortic insufficiency +1, dimensionless index 0.6 with mean gradient of 10 and peak gradient of 16 with mean velocity 2.0 m/s. Most recent MRI does confirm it is bicuspid, no significant stenosis, recheck echo in a year.     (I10) Essential hypertension  Comment: Under good control currently on metoprolol.     (E78.2) Mixed hyperlipidemia  Comment: Excellent total cholesterol of 125 with an LDL of 66.  He also has elevated LP(a) of 75.  Will check fasting lipids today.     (G47.33) IDA (obstructive sleep apnea)  Comment: Never was really tolerant of CPAP.  He has repeat sleep study set up in February 2025.    (R06.09) SAL (dyspnea on exertion)  Comment: Normal hemoglobin, normal BNP, normal troponin, normal stress MRI, no significant aortic stenosis, suspect possibly due to obesity, physical lack of training, possibly sleep apnea.  Will try Isordil 10 mg p.o. 3 times daily but need to watch out with his Cialis.     (E66.09,  Z68.32) Class 1 obesity due to excess calories without serious comorbidity with body mass index (BMI) of 32.0 to 32.9 in  adult  Comment: Agree to continue working on weight loss.     Plan  1.  Fasting lipid profile.  2.  Work on weight loss.  3.  Repeat echo May 2025.  4.  Sleep study February 2025.  5.  Trial Isordil 10 mg p.o. 3 times daily and or schedule for 90-day supply.  6.  Follow-up with me in a year or sooner if needed.    The longitudinal plan of care for the diagnosis(es)/condition(s) as documented were addressed during this visit. Due to the added complexity in care, I will continue to support [facility] in the subsequent management and with ongoing continuity of care.     Subjective  CC: 54-year-old white gentleman here for follow-up visit.  Still tired all the time, still tells me when he goes up a flight of steps he does get a little winded but better than it has been.  He is able to function outside without any significant shortness of breath doing gardening.  No significant syncope, presyncope, chest pains, palpitations, PND, orthopnea or peripheral edema.    Medications  Current Outpatient Medications   Medication Sig Dispense Refill    aspirin (ASA) 81 MG EC tablet TAKE 1 TABLET (81 MG) BY MOUTH DAILY START TOMORROW. 90 tablet 2    atorvastatin (LIPITOR) 80 MG tablet Take 1 tablet (80 mg) by mouth daily 90 tablet 3    buPROPion (WELLBUTRIN SR) 100 MG 12 hr tablet Take 100 mg by mouth daily      isosorbide dinitrate (ISORDIL) 10 MG tablet Take 1 tablet (10 mg) by mouth 3 times daily. 21 tablet 2    melatonin 3 MG tablet as needed      metoprolol succinate ER (TOPROL XL) 25 MG 24 hr tablet TAKE 0.5 TABLETS BY MOUTH 2 TIMES DAILY. 90 tablet 3    omeprazole (PRILOSEC) 20 MG capsule [OMEPRAZOLE (PRILOSEC) 20 MG CAPSULE] Take 1 capsule (20 mg total) by mouth at bedtime. 30 capsule 0    polyvinyl alcohol/povidone (CLEAR EYES NATURAL TEARS OPHT) [POLYVINYL ALCOHOL/POVIDONE (CLEAR EYES NATURAL TEARS OPHT)] Apply 2 drops to eye 3 (three) times a day as needed.      ranolazine (RANEXA) 500 MG 12 hr tablet Take 1 tablet (500 mg)  "by mouth 2 times daily 180 tablet 3    sodium chloride (OCEAN) 0.65 % nasal spray [SODIUM CHLORIDE (OCEAN) 0.65 % NASAL SPRAY] Apply 2 sprays into each nostril as needed for congestion.      tadalafil (CIALIS) 20 MG tablet Take 1 tablet (20 mg) by mouth daily as needed 30 tablet 0       Objective  /84 (BP Location: Right arm, Patient Position: Sitting, Cuff Size: Adult Regular)   Pulse 63   Resp 14   Wt 107 kg (236 lb)   BMI 33.86 kg/m      General Appearance:    Alert, cooperative, no distress, appears stated age   Head:    Normocephalic, without obvious abnormality, atraumatic   Throat:   Lips, mucosa, and tongue normal; teeth and gums normal   Neck:   Supple, symmetrical, trachea midline, no adenopathy;        thyroid:  No enlargement/tenderness/nodules; no carotid    bruit or JVD   Back:     Symmetric, no curvature, ROM normal, no CVA tenderness   Lungs:     Clear to auscultation bilaterally, respirations unlabored   Chest wall:    No tenderness or deformity   Heart:    Regular rate and rhythm, S1 and S2 normal, no murmur, rub   or gallop   Abdomen:     Soft, non-tender, bowel sounds active all four quadrants,     no masses, no organomegaly   Extremities:   Normal, atraumatic, no cyanosis or edema   Pulses:   2+ and symmetric all extremities   Skin:   Skin color, texture, turgor normal, no rashes or lesions     Results    Lab Results personally reviewed   Lab Results   Component Value Date    CHOL 125 07/11/2023    CHOL 95 10/17/2022     Lab Results   Component Value Date    HDL 37 (L) 07/11/2023    HDL 37 (L) 10/17/2022     No components found for: \"LDLCALC\"  Lab Results   Component Value Date    TRIG 110 07/11/2023    TRIG 79 10/17/2022     Lab Results   Component Value Date    WBC 8.2 07/16/2024    HGB 14.1 07/16/2024    HCT 41.5 07/16/2024     07/16/2024     Lab Results   Component Value Date    BUN 19.0 07/16/2024     07/16/2024    CO2 22 07/16/2024       Reviewed electrocardiogram " normal sinus rhythm, within normal limits.

## 2024-12-08 ENCOUNTER — HEALTH MAINTENANCE LETTER (OUTPATIENT)
Age: 54
End: 2024-12-08

## 2024-12-09 ENCOUNTER — MYC MEDICAL ADVICE (OUTPATIENT)
Dept: CARDIOLOGY | Facility: CLINIC | Age: 54
End: 2024-12-09
Payer: COMMERCIAL

## 2024-12-09 DIAGNOSIS — I10 BENIGN ESSENTIAL HYPERTENSION: ICD-10-CM

## 2024-12-09 DIAGNOSIS — R06.09 DOE (DYSPNEA ON EXERTION): Primary | ICD-10-CM

## 2024-12-09 DIAGNOSIS — I20.89 CHRONIC STABLE ANGINA (H): ICD-10-CM

## 2024-12-09 DIAGNOSIS — I20.0 ACCELERATING ANGINA (H): ICD-10-CM

## 2024-12-10 RX ORDER — ISOSORBIDE MONONITRATE 30 MG/1
30 TABLET, EXTENDED RELEASE ORAL DAILY
Qty: 30 TABLET | Refills: 0 | Status: SHIPPED | OUTPATIENT
Start: 2024-12-10

## 2024-12-10 NOTE — TELEPHONE ENCOUNTER
Dr. Albarran -- I see pt is prescribed Cialis. Should he discontinue use of this while taking Imdur? Thank you. aj    ---------------------------------------------------  Msg received:  From: Bhumika Albarran MD  Sent: 12/9/2024   4:22 PM CST  To: [facility] Cv Rn Team Z  Subject: FW: Wegovy and isosorbide dinitrate              Please connect with him, change Isordil to 30 mg of Imdur isosorbide mononitrate once daily, see how he does with that, if gets headaches he can cut it in half.  In terms of Wegovy, I strongly recommend he connect with his primary about that, I prefer that primaries run weight loss drugs.  LF

## 2024-12-11 NOTE — TELEPHONE ENCOUNTER
Rx sent to [facility] pharmacy and [facility] msg sent with Dr. Albarran's comments/recommendations. aj    ------------------------------------------------  Msg received:  From: Bhumika Albarran MD  Sent: 12/10/2024   2:29 PM CST  To: [facility] Cv Rn Team Z  Subject: FW: Wegovy and isosorbide dinitrate              We have discussed this in the past, isosorbide dinitrate 3 times a day Isordil should not be utilized with any of the ED type drugs so thus I assume he is not taking it, thus he should not be using ED type drugs with isosorbide mononitrate or Imdur.  If he wants to use the ED tach drugs, then we should stop all nitrates.  LF

## 2025-01-02 ENCOUNTER — TELEPHONE (OUTPATIENT)
Dept: CARDIOLOGY | Facility: CLINIC | Age: 55
End: 2025-01-02
Payer: COMMERCIAL

## 2025-01-02 DIAGNOSIS — I20.89 CHRONIC STABLE ANGINA (H): ICD-10-CM

## 2025-01-02 DIAGNOSIS — I10 BENIGN ESSENTIAL HYPERTENSION: ICD-10-CM

## 2025-01-02 DIAGNOSIS — R06.09 DOE (DYSPNEA ON EXERTION): ICD-10-CM

## 2025-01-02 RX ORDER — ISOSORBIDE MONONITRATE 30 MG/1
30 TABLET, EXTENDED RELEASE ORAL DAILY
Qty: 90 TABLET | Refills: 0 | Status: SHIPPED | OUTPATIENT
Start: 2025-01-02

## 2025-01-02 NOTE — TELEPHONE ENCOUNTER
Refill granted. Dinitrate removed from medication list. -[facility] [place]

## 2025-01-16 ENCOUNTER — TRANSCRIBE ORDERS (OUTPATIENT)
Dept: OTHER | Age: 55
End: 2025-01-16

## 2025-01-16 DIAGNOSIS — R06.02 SOB (SHORTNESS OF BREATH): Primary | ICD-10-CM

## 2025-01-20 DIAGNOSIS — R06.02 SOB (SHORTNESS OF BREATH): Primary | ICD-10-CM

## 2025-01-24 ASSESSMENT — SLEEP AND FATIGUE QUESTIONNAIRES
HOW LIKELY ARE YOU TO NOD OFF OR FALL ASLEEP WHILE SITTING AND TALKING TO SOMEONE: WOULD NEVER DOZE
HOW LIKELY ARE YOU TO NOD OFF OR FALL ASLEEP WHILE SITTING QUIETLY AFTER LUNCH WITHOUT ALCOHOL: SLIGHT CHANCE OF DOZING
HOW LIKELY ARE YOU TO NOD OFF OR FALL ASLEEP WHEN YOU ARE A PASSENGER IN A CAR FOR AN HOUR WITHOUT A BREAK: WOULD NEVER DOZE
HOW LIKELY ARE YOU TO NOD OFF OR FALL ASLEEP WHILE SITTING AND READING: SLIGHT CHANCE OF DOZING
HOW LIKELY ARE YOU TO NOD OFF OR FALL ASLEEP IN A CAR, WHILE STOPPED FOR A FEW MINUTES IN TRAFFIC: WOULD NEVER DOZE
HOW LIKELY ARE YOU TO NOD OFF OR FALL ASLEEP WHILE SITTING INACTIVE IN A PUBLIC PLACE: WOULD NEVER DOZE
HOW LIKELY ARE YOU TO NOD OFF OR FALL ASLEEP WHILE WATCHING TV: WOULD NEVER DOZE
HOW LIKELY ARE YOU TO NOD OFF OR FALL ASLEEP WHILE LYING DOWN TO REST IN THE AFTERNOON WHEN CIRCUMSTANCES PERMIT: SLIGHT CHANCE OF DOZING

## 2025-01-29 ENCOUNTER — VIRTUAL VISIT (OUTPATIENT)
Dept: SLEEP MEDICINE | Facility: CLINIC | Age: 55
End: 2025-01-29
Payer: COMMERCIAL

## 2025-01-29 VITALS
SYSTOLIC BLOOD PRESSURE: 135 MMHG | WEIGHT: 236 LBS | BODY MASS INDEX: 33.79 KG/M2 | DIASTOLIC BLOOD PRESSURE: 80 MMHG | HEIGHT: 70 IN

## 2025-01-29 DIAGNOSIS — E83.19 OTHER DISORDERS OF IRON METABOLISM: Primary | ICD-10-CM

## 2025-01-29 DIAGNOSIS — R06.83 SNORING: ICD-10-CM

## 2025-01-29 DIAGNOSIS — I25.83 CORONARY ARTERIOSCLEROSIS DUE TO LIPID RICH PLAQUE: ICD-10-CM

## 2025-01-29 DIAGNOSIS — G25.81 RESTLESS LEGS SYNDROME (RLS): ICD-10-CM

## 2025-01-29 DIAGNOSIS — G47.33 OSA (OBSTRUCTIVE SLEEP APNEA): ICD-10-CM

## 2025-01-29 DIAGNOSIS — Z95.5 STATUS POST CORONARY ARTERY STENT PLACEMENT: ICD-10-CM

## 2025-01-29 DIAGNOSIS — Z78.9 INTOLERANCE OF CONTINUOUS POSITIVE AIRWAY PRESSURE (CPAP) VENTILATION: ICD-10-CM

## 2025-01-29 DIAGNOSIS — I10 BENIGN ESSENTIAL HYPERTENSION: ICD-10-CM

## 2025-01-29 DIAGNOSIS — R53.83 FATIGUE, UNSPECIFIED TYPE: ICD-10-CM

## 2025-01-29 DIAGNOSIS — Q23.81 BICUSPID AORTIC VALVE: ICD-10-CM

## 2025-01-29 DIAGNOSIS — G47.8 UNREFRESHED BY SLEEP: ICD-10-CM

## 2025-01-29 PROCEDURE — 98003 SYNCH AUDIO-VIDEO NEW HI 60: CPT | Performed by: INTERNAL MEDICINE

## 2025-01-29 RX ORDER — ZOLPIDEM TARTRATE 10 MG/1
TABLET ORAL
Qty: 1 TABLET | Refills: 0 | Status: SHIPPED | OUTPATIENT
Start: 2025-01-29

## 2025-01-29 ASSESSMENT — PAIN SCALES - GENERAL: PAINLEVEL_OUTOF10: NO PAIN (0)

## 2025-01-29 NOTE — PROGRESS NOTES
Virtual Visit Details    Type of service:  Video Visit   Originating Location (pt. Location): Home    Distant Location (provider location):  Off-site  Platform used for Video Visit: [facility]      Outpatient Sleep Medicine Consultation:      Name: Taurus Cotto MRN# [id number]   Age: 54 year old YOB: 1970     Date of Consultation: January 29, 2025  Consultation is requested by: Nita Ratliff MD  [address] [place] [address] Nita Ratliff  Primary care provider: Nita Ratliff       Reason for Sleep Consult:     Taurus Cotto is sent by Nita Ratliff for a sleep consultation regarding management of previously diagnosed obstructive sleep apnea that is currently untreated.    Patient s Reason for visit  Taurus Cotto main reason for visit: (Patient-Rptd) Sleep apnea and sifficulty using cpap  Patient states problem(s) started: (Patient-Rptd) Several years ago  Taurus L Cotto's goals for this visit: (Patient-Rptd) Identify solution for treating sleep apnea effectively           Assessment and Plan:     Summary Sleep Diagnoses:  Previously diagnosed obstructive sleep apnea , currently untreated, history of intolerance to CPAP therapy  Snoring, awakenings gasping for air, nonrestorative sleep and fatigue-likely due to untreated sleep disordered breathing  Intermittent RLS      Comorbid Diagnoses:  Bicuspid aortic valve  Coronary artery disease ,status post stent placement  Benign essential hypertension  GERD  Hyperlipidemia  BMI 33.86 kg/m     Summary Recommendations:  Previously diagnosed obstructive sleep apnea , currently untreated, history of intolerance to CPAP therapy  Snoring, awakenings gasping for air, nonrestorative sleep and fatigue-likely due to untreated sleep disordered breathing  Requested our clinic staff to retrieve the previous home sleep study report that he completed through [facility] and review the report.  Since the last sleep study was a home sleep study which was  done more than 8 years ago and he is interested in exploring alternate treatment options for sleep apnea, recommended obtaining an in-lab sleep study to re-evaluate the current status of the sleep-related breathing disorder.  Patient was agreeable with the plan.  Prescription provided for zolpidem 10 mg #1 tablet with no refills and he was strictly instructed to take the medication only after the connections are all completed for the sleep study.  Discussed pathophysiology and risks of untreated IDA.  We discussed the pros and cons of both CPAP and non-CPAP for treatment of IDA.  Information was provided regarding the different treatment options for IDA as summary.  Patient will follow up 3 months after the sleep study. We will review results and [facility] (if indicated[facility] prior to the follow up.     Intermittent RLS:   Non-pharmacological measures to control RLS symptoms reviewed.    Recommended obtaining fasting blood work to evaluate for iron deficiency and consider iron supplementation if indicated.  Patient instructed to avoid caffeine within 6 hours before bed.  Patient instructed let us know if the RLS symptoms increase in frequency or severity.    BMI 33.86 kg/m :  We discussed weight management with diet and exercise      --We discussed stimulus control measures for the occasional insomnia.    --Patient was strongly advised to avoid driving, operating any heavy machinery or other hazardous situations while drowsy or sleepy.  Patient was counseled on the importance of driving while alert, to pull over if drowsy, or nap before getting into the vehicle if sleepy.       Orders Placed This Encounter   Procedures    Comprehensive Sleep Study    Ferritin    Iron and Iron Binding Capacity         Summary Counseling:    Sleep Testing Reviewed  Obstructive Sleep Apnea Reviewed  Complications of Untreated Sleep Apnea Reviewed  Nonpharmacological measures to control RLS Reviewed    Medical  "Decision-making:   Educational materials provided in instructions       CC: Nita Ratliff MD      The above note was dictated using voice recognition software. Although reviewed after completion, some word and grammatical error may remain . Please contact the author for any clarifications.     \" Total time spent was 70 minutes for this appointment on this date of service which include time spent before, during and after the visit for chart review, patient care, counseling and coordination of care including documentation.\"      Genna Alegre MD  [facility]  [address] [place] [address]           History of Present Illness:     Past Sleep Evaluations:  Home sleep study was obtained through [facility]\"" approximately 8-9 years ago that showed evidence of IDA.  Sleep study report is currently unavailable.  Patient was prescribed CPAP therapy after the sleep study.  He obtained CPAP device through [employer]-he got the CPAP device replaced through [employer] since the initial CPAP was recalled.  Patient reports sporadic and intermittent CPAP use and has not used the CPAP for more than 1  year  He reported intolerant to CPAP  He is  mostly a side sleeper. He was using nasal mask -the mask would get frequently dislodged with positional changes during sleep disrupting his sleep   He also reports pressure settings could be an issue  He would keep the mask on for maybe 3-4 hours and would end up taking the mask off during the middle of the night   He was unable to notice any benefit with the CPAP therapy.    Currently, patient snores:(Patient-Rptd) Yes  Other people complain about his snoring: (Patient-Rptd) Yes  He reports occasionally waking up gasping for air  Patient has been told he stops breathing in his sleep:(Patient-Rptd) No.  Patient's wife reports heavy breathing during sleep.  He has issues with the following: (Patient-Rptd) Morning mouth dryness;Stuffy " nose when you wake up;Heartburn or reflux at night  He is concerned about the sleep apnea and wants to explore alternate options for the treatment of sleep apnea.  His weight has fluctuated a lot since his last home sleep study.       SLEEP-WAKE SCHEDULE:     Work/School Days: Patient goes to school/work: (Patient-Rptd) Yes   Usually gets into bed at (Patient-Rptd) 10:30 PM  Takes patient about (Patient-Rptd) 20 min to fall asleep  Has trouble falling asleep (Patient-Rptd) 1x or leas per week. Goes in spurts though. I could have several daysnof difficulty. nights per week. Suspect caffeine related.    Wakes up in the middle of the night (Patient-Rptd) 3-5 times.  Wakes up due to (Patient-Rptd) Snorting self awake;Use the bathroom;Uncertain  He has trouble falling back asleep (Patient-Rptd) 1x per week .   It usually takes (Patient-Rptd) Varies from right away to an hour or more, but usually right away to get back to sleep  Patient is usually up at (Patient-Rptd) 7:45 AM  Uses alarm: (Patient-Rptd) No    Weekends/Non-work Days/All Other Days:  Usually gets into bed at (Patient-Rptd) 11:30   Takes patient about (Patient-Rptd) 20 min to fall asleep  Patient is usually up at (Patient-Rptd) 9AM  Uses alarm: (Patient-Rptd) No    He reports nonrestorative sleep and fatigue.  Sleep Need  Patient gets  (Patient-Rptd) 8hrs but I dont feel rested much of thenl time    Patient thinks he needs about (Patient-Rptd) 8hrs sleep    Taurus ALLAN Cotto prefers to sleep in this position(s): (Patient-Rptd) Side;Stomach   Patient states they do the following activities in bed: (Patient-Rptd) Read    Naps  Patient takes a purposeful nap (Patient-Rptd) Never times a week and naps are usually   in duration  He feels better after a nap:    He dozes off unintentionally (Patient-Rptd) Very rarely days per week  Patient has had a driving accident or near-miss due to sleepiness/drowsiness: (Patient-Rptd) No      SLEEP DISRUPTIONS:    Movement:  He  reports RLS, but symptoms are very intermittent and sporadic  Patient gets pain, discomfort, with an urge to move:  (Patient-Rptd) Yes  It happens when he is resting:  (Patient-Rptd) Yes  It happens more at night:  (Patient-Rptd) Yes  Patient has been told he kicks his legs at night:  (Patient-Rptd) No  He was a previous blood donor  He has never tried medications for RLS symptoms in the past.     Behaviors in Sleep:  There are no reports of sleepwalking, sleep talking, sleep eating or dream enactment behavior.  He has experienced sudden muscle weakness during the day: (Patient-Rptd) No    Is there anything else you would like your sleep provider to know:      CAFFEINE AND OTHER SUBSTANCES:    Patient consumes caffeinated beverages per day:  (Patient-Rptd) 2  Last caffeine use is usually: (Patient-Rptd) Either AM or one soda in the evening  List of any prescribed or over the counter stimulants that patient takes: (Patient-Rptd) None  List of any prescribed or over the counter sleep medication patient takes: (Patient-Rptd) None  List of previous sleep medications that patient has tried: (Patient-Rptd) Maletonin  Patient drinks alcohol to help them sleep: (Patient-Rptd) No  Patient drinks alcohol near bedtime: (Patient-Rptd) No    Family History:  Patient has a family member been diagnosed with a sleep disorder: (Patient-Rptd) No            SCALES:    EPWORTH SLEEPINESS SCALE         1/24/2025     9:44 AM    [place] Sleepiness Scale ( JEFF James  [id number]<br>ESS - [place]/English - Final version - 21 Nov 07 - [facility].)   Sitting and reading Slight chance of dozing   Watching TV Would never doze   Sitting, inactive in a public place (e.g. a theatre or a meeting) Would never doze   As a passenger in a car for an hour without a break Would never doze   Lying down to rest in the afternoon when circumstances permit Slight chance of dozing   Sitting and talking to someone Would never doze   Sitting quietly  "after a lunch without alcohol Slight chance of dozing   In a car, while stopped for a few minutes in traffic Would never doze   [place] Score (MC) 3   [place] Score (Sleep) 3        Patient-reported         INSOMNIA SEVERITY INDEX (NADYA)          1/24/2025     9:31 AM   Insomnia Severity Index (NADYA)   Difficulty falling asleep 1   Difficulty staying asleep 2   Problems waking up too early 2   How SATISFIED/DISSATISFIED are you with your CURRENT sleep pattern? 3   How NOTICEABLE to others do you think your sleep problem is in terms of impairing the quality of your life? 2   How WORRIED/DISTRESSED are you about your current sleep problem? 3   To what extent do you consider your sleep problem to INTERFERE with your daily functioning (e.g. daytime fatigue, mood, ability to function at work/daily chores, concentration, memory, mood, etc.) CURRENTLY? 3   NADYA Total Score 16        Patient-reported       Guidelines for Scoring/Interpretation:  Total score categories:  0-7 = No clinically significant insomnia   8-14 = Subthreshold insomnia   15-21 = Clinical insomnia (moderate severity)  22-28 = Clinical insomnia (severe)  Used via courtesy of www.Xiaom[employer].va.gov with permission from Carlos Nguyen PhD., [facility]      STOP BANG 5/8 1/29/2025     9:17 AM   STOP BANG Questionnaire (  2008, the American Society of Anesthesiologists, Inc. [employer]ocelyn [employer].)   B/P Clinic: --   BMI Clinic: 33.86         GAD7         No data to display                  CAGE-AID         No data to display                CAGE-AID reprinted with permission from the [place] Medical Journal, LIANNA Flynn. and MICHEL Sidhu, \"Conjoint screening questionnaires for alcohol and drug abuse\" [place] Medical Journal 94: 135-140, 1995.      PATIENT HEALTH QUESTIONNAIRE-9 (PHQ - 9)         No data to display                Developed by Lencho Rubi, Gila Eldridge, Noam Doherty and colleagues, with an educational " jorge from [employer] No permission required to reproduce, translate, display or distribute.        Allergies:    Allergies   Allergen Reactions    Lisinopril Cough       Medications:    Current Outpatient Medications   Medication Sig Dispense Refill    aspirin (ASA) 81 MG EC tablet TAKE 1 TABLET (81 MG) BY MOUTH DAILY START TOMORROW. 90 tablet 2    atorvastatin (LIPITOR) 80 MG tablet Take 1 tablet (80 mg) by mouth daily 90 tablet 3    buPROPion (WELLBUTRIN SR) 100 MG 12 hr tablet Take 100 mg by mouth daily      isosorbide mononitrate (IMDUR) 30 MG 24 hr tablet Take 1 tablet (30 mg) by mouth daily. 90 tablet 0    melatonin 3 MG tablet as needed      metoprolol succinate ER (TOPROL XL) 25 MG 24 hr tablet TAKE 0.5 TABLETS BY MOUTH 2 TIMES DAILY. 90 tablet 3    omeprazole (PRILOSEC) 20 MG capsule [OMEPRAZOLE (PRILOSEC) 20 MG CAPSULE] Take 1 capsule (20 mg total) by mouth at bedtime. 30 capsule 0    polyvinyl alcohol/povidone (CLEAR EYES NATURAL TEARS OPHT) [POLYVINYL ALCOHOL/POVIDONE (CLEAR EYES NATURAL TEARS OPHT)] Apply 2 drops to eye 3 (three) times a day as needed.      ranolazine (RANEXA) 500 MG 12 hr tablet Take 1 tablet (500 mg) by mouth 2 times daily 180 tablet 3    sodium chloride (OCEAN) 0.65 % nasal spray [SODIUM CHLORIDE (OCEAN) 0.65 % NASAL SPRAY] Apply 2 sprays into each nostril as needed for congestion.      tadalafil (CIALIS) 20 MG tablet Take 1 tablet (20 mg) by mouth daily as needed 30 tablet 0       Problem List:  Patient Active Problem List    Diagnosis Date Noted    Class 1 obesity due to excess calories without serious comorbidity with body mass index (BMI) of 32.0 to 32.9 in adult 08/16/2024     Priority: Medium    IDA (obstructive sleep apnea) 03/29/2021     Priority: Medium    Coronary arteriosclerosis due to lipid rich plaque 03/02/2021     Priority: Medium    SAL (dyspnea on exertion)      Priority: Medium    Chronic stable angina      Priority: Medium    Sinus bradycardia      Priority:  Medium    Benign essential hypertension 09/05/2018     Priority: Medium    Bicuspid aortic valve 07/29/2017     Priority: Medium    S/P hernia surgery 07/01/2015     Priority: Medium    Mixed hyperlipidemia 04/16/2013     Priority: Medium    Neck pain 02/13/2012     Priority: Medium        Past Medical/Surgical History:  Past Medical History:   Diagnosis Date    Cardiac murmur     Coronary artery disease     GERD (gastroesophageal reflux disease)     High cholesterol     Hyperlipidemia     Hypertension      Past Surgical History:   Procedure Laterality Date    CARDIAC CATHETERIZATION  07/31/2017    AUREA to distal RCA    CARDIAC CATHETERIZATION N/A 7/31/2017    Procedure: Coronary Angiogram;  Surgeon: Dandre Love MD;  Location: [facility] Lab;  Service:     CORONARY STENT PLACEMENT      CV CORONARY ANGIOGRAM N/A 3/2/2021    Procedure: Coronary Angiogram;  Surgeon: Marivel Loo MD;  [facility];  Service: Cardiology    CV CORONARY ANGIOGRAM N/A 10/17/2022    Procedure: Coronary Angiogram;  Surgeon: Manuel Mclain MD;  Location: [facility] CV    CV FRACTIONAL FLOW RATIO WIRE N/A 10/17/2022    Procedure: Fractional Flow Ratio Wire;  Surgeon: Manuel Mclain MD;  Location: [facility] LAB CV    CV LEFT HEART CATH N/A 10/17/2022    Procedure: Left Heart Catheterization;  Surgeon: Manuel Mclain MD;  Location: [facility] LAB CV    CV LEFT HEART CATHETERIZATION WITHOUT LEFT VENTRICULOGRAM Left 3/2/2021    Procedure: Left Heart Catheterization Without Left Ventriculogram;  Surgeon: Marivel Loo MD;  [facility];  Service: Cardiology    CV PCI ANGIOPLASTY N/A 10/17/2022    Procedure: Percutaneous Coronary Intervention - Angioplasty;  Surgeon: Manuel Mclain MD;  Location: [facility] CV    CV PCI STENT DRUG ELUTING N/A 10/17/2022    Procedure: Percutaneous Coronary Intervention Stent;  Surgeon: Manuel Mclain MD;   Location: [facility] CATH LAB CV    FRACTURE SURGERY      HERNIA REPAIR      AZ CATH PLMT L HRT & ARTS W/NJX & ANGIO IMG S&I Left 7/31/2017    Procedure: Left Heart Catheterization Without Left Ventriculogram;  Surgeon: Dandre Love MD;  Location: [facility] Cath Lab;  Service: Cardiology    RELEASE CARPAL TUNNEL Right        Social History:  Social History     Socioeconomic History    Marital status:      Spouse name: Not on file    Number of children: Not on file    Years of education: Not on file    Highest education level: Not on file   Occupational History    Not on file   Tobacco Use    Smoking status: Former     Types: Cigarettes, Cigars     Passive exposure: Past (as a child)    Smokeless tobacco: Never    Tobacco comments:     still occasionally smokes cigars   Vaping Use    Vaping status: Never Used   Substance and Sexual Activity    Alcohol use: No    Drug use: Not Currently    Sexual activity: Not on file   Other Topics Concern    Not on file   Social History Narrative    Not on file     Social Drivers of Health     Financial Resource Strain: Not on file   Food Insecurity: Not on file   Transportation Needs: Not on file   Physical Activity: Not on file   Stress: Not on file   Social Connections: Not on file   Interpersonal Safety: Not on file   Housing Stability: Not on file       Family History:  No family history on file.    Review of Systems:  A complete review of systems reviewed by me is negative with the exeption of what has been mentioned in the history of present illness.  In the last TWO WEEKS have you experienced any of the following symptoms?  Fevers: (Patient-Rptd) No  Night Sweats: (Patient-Rptd) No  Weight Gain: (Patient-Rptd) Yes  Pain at Night: (Patient-Rptd) No  Double Vision: (Patient-Rptd) No  Changes in Vision: (Patient-Rptd) No  Difficulty Breathing through Nose: (Patient-Rptd) Yes  Sore Throat in Morning: (Patient-Rptd) No  Dry Mouth in the Morning: (Patient-Rptd)  "Yes  Shortness of Breath Lying Flat: (Patient-Rptd) No  Shortness of Breath With Activity: (Patient-Rptd) Yes  Awakening with Shortness of Breath: (Patient-Rptd) Yes  Increased Cough: (Patient-Rptd) No  Heart Racing at Night: (Patient-Rptd) No  Swelling in Feet or Legs: (Patient-Rptd) Yes  Diarrhea at Night: (Patient-Rptd) No  Heartburn at Night: (Patient-Rptd) Yes  Urinating More than Once at Night: (Patient-Rptd) No  Losing Control of Urine at Night: (Patient-Rptd) No  Joint Pains at Night: (Patient-Rptd) No  Headaches in Morning: (Patient-Rptd) No  Weakness in Arms or Legs: (Patient-Rptd) No  Depressed Mood: (Patient-Rptd) Yes  Anxiety: (Patient-Rptd) Yes      Physical Examination:  Vitals: Ht 1.778 m (5' 10\")   Wt 107 kg (236 lb)   BMI 33.86 kg/m    BMI= Body mass index is 33.86 kg/m .  General: No apparent distress, appropriately groomed  Head: Normocephalic, atraumatic  Chest: No cough, no audible wheezing, able to talk in full sentences.   Psych: mood and affect normal   Neuro:  Mental status: Alert and  Oriented X 3  Speech: normal            Data: All pertinent previous laboratory data reviewed     Recent Labs   Lab Test 07/16/24 1923 01/17/24  1229    139   POTASSIUM 3.9 4.7   CHLORIDE 106 106   CO2 22 24   ANIONGAP 12 9   * 91   BUN 19.0 25.0*   CR 1.07 1.19*   LEVI 8.9 9.1       Recent Labs   Lab Test 07/16/24 1923   WBC 8.2   RBC 4.77   HGB 14.1   HCT 41.5   MCV 87   MCH 29.6   MCHC 34.0   RDW 11.9          Recent Labs   Lab Test 07/16/24 1923   PROTTOTAL 6.6   ALBUMIN 4.3   BILITOTAL 1.0   ALKPHOS 100   AST 25   ALT 39       TSH (uIU/mL)   Date Value   07/16/2024 3.01   06/14/2018 2.64       No results found for: \"UAMP\", \"UBARB\", \"BENZODIAZEUR\", \"UCANN\", \"UCOC\", \"OPIT\", \"UPCP\"    No results found for: \"IRONSAT\", \"RS48751\", \"SUZIE\"    No results found for: \"PH\", \"PHARTERIAL\", \"PO2\", \"UH0TRBDHLAQ\", \"SAT\", \"PCO2\", \"HCO3\", \"BASEEXCESS\", \"ZHAO\", \"BEB\"       Echocardiography: Study Date: " 05/18/2022   Interpretation Summary:     The left ventricle is normal in size.  Left ventricular systolic function is normal.  The visual ejection fraction is 55-60%.  Regional wall motion analysis is limited by decreased endocardial  resolution.No obvious wall motion abnormality observed  The right ventricle is mildly dilated.  The right ventricular systolic function is normal.  The aortic valve leaflets are not visualized well enough to exclude a bicupid  aortic valve.The valve appears probably trilieaflet with sclerotic changes  There is mild (1+) aortic regurgitation.  Mild valvular aortic stenosis.Peak velocity 2m/s, mean gradient 11mmhg,  When compared to 2/2021 the mean gradient across the aortic valve is mildly  increased on the current exam    MR Cardiac w Contrast and stress:  Scan Date: 2024-Aug-07      1. The LV is normal in cavity size and wall thickness. The global systolic function is normal. The LVEF is  63 %. There are no regional wall motion abnormalities.     2. The RV is normal in cavity size. The global systolic function is normal. The RVEF is 66 %.      3. Bicuspid aortic valve.     4. Late gadolinium enhancement imaging shows no MI, fibrosis or infiltrative disease.      5. Regadenoson stress perfusion imaging shows no ischemia.     6. There is no pericardial effusion.     7. There is no intracardiac thrombus.     CONCLUSIONS:   1. Normal LV and RV systolic functions with LVEF of 63%.  2.  Late gadolinium enhancement imaging shows no MI, fibrosis or infiltrative disease  3.  No evidence of inducible ischemia on regadenoson stress perfusion.      Chest x-ray:   XR Chest 2 Views 07/16/2024    Narrative  EXAM: XR CHEST 2 VIEWS  LOCATION: [facility]  DATE: 7/16/2024    INDICATION: sob  COMPARISON: Chest radiograph 12/24/2022    Impression  IMPRESSION: No focal consolidation, pleural effusion or pneumothorax. Cardiomediastinal silhouette is unremarkable.      Chest CT: No  results found for this or any previous visit from the past 365 days.      PFT: Most Recent [place] Pulmonary Function Testing: No results found      Genna Alegre MD 1/29/2025

## 2025-01-29 NOTE — PATIENT INSTRUCTIONS
"          MY TREATMENT INFORMATION FOR SLEEP APNEA-  Taurus Cotto    DOCTOR : Genna Alegre MD    Am I having a sleep study at a sleep center?  --->Due to normal delays, you will be contacted within 2-4 weeks to schedule    Frequently asked questions:  1. What is Obstructive Sleep Apnea (IDA)? IDA is the most common type of sleep apnea. Apnea means, \"without breath.\"  Apnea is most often caused by narrowing or collapse of the upper airway as muscles relax during sleep.   Almost everyone has occasional apneas. Most people with sleep apnea have had brief interruptions at night frequently for many years.  The severity of sleep apnea is related to how frequent and severe the events are.   2. What are the consequences of IDA? Symptoms include: feeling sleepy during the day, snoring loudly, gasping or stopping of breathing, trouble sleeping, and occasionally morning headaches or heartburn at night.  Sleepiness can be serious and even increase the risk of falling asleep while driving. Other health consequences may include development of high blood pressure and other cardiovascular disease in persons who are susceptible. Untreated IDA  can contribute to heart disease, stroke and diabetes.   3. What are the treatment options? In most situations, sleep apnea is a lifelong disease that must be managed with daily therapy. Medications are not effective for sleep apnea and surgery is generally not considered until other therapies have been tried. Your treatment is your choice . Continuous Positive Airway (CPAP) works right away and is the therapy that is effective in nearly everyone. An oral device to hold your jaw forward is usually the next most reliable option. Other options include postioning devices (to keep you off your back), weight loss, and surgery including a tongue pacing device. There is more detail about some of these options below.  4. Are my sleep studies covered by insurance? Although we " will request verification of coverage, we advise you also check in advance of the study to ensure there is coverage.    Important tips for those choosing CPAP and similar devices  REMEMBER-IF YOU RECEIVE A CALL FROM  [phone]-->IT IS TO SETUP A DEVICE  For new devices, sign up for device CASSY to monitor your device for your followup visits  We encourage you to utilize the [employer] cassy or website ( https://Newslines.Clusterize/ ) to monitor your therapy progress and share the data with your healthcare team when you discuss your sleep apnea.                                                    Know your equipment:  CPAP is continuous positive airway pressure that prevents obstructive sleep apnea by keeping the throat from collapsing while you are sleeping. In most cases, the device is  smart  and can slowly self-adjusts if your throat collapses and keeps a record every day of how well you are treated-this information is available to you and your care team.  BPAP is bilevel positive airway pressure that keeps your throat open and also assists each breath with a pressure boost to maintain adequate breathing.  Special kinds of BPAP are used in patients who have inadequate breathing from lung or heart disease. In most cases, the device is  smart  and can slowly self-adjusts to assist breathing. Like CPAP, the device keeps a record of how well you are treated.  Your mask is your connection to the device. You get to choose what feels most comfortable and the staff will help to make sure if fits. Here: are some examples of the different masks that are available: Magnetic mask aids may assist with use but there are safety issues that should be addressed when considering with magnets* ( see end of discussion).       Key points to remember on your journey with sleep apnea:  Sleep study.  PAP devices often need to be adjusted during a sleep study to show that they are effective and adjusted right.  Good tips to remember: Try  wearing just the mask during a quiet time during the day so your body adapts to wearing it. A humidifier is recommended for comfort in most cases to prevent drying of your nose and throat. Allergy medication from your provider may help you if you are having nasal congestion.  Getting settled-in. It takes more than one night for most of us to get used to wearing a mask. Try wearing just the mask during a quiet time during the day so your body adapts to wearing it. A humidifier is recommended for comfort in most cases. Our team will work with you carefully on the first day and will be in contact within 4 days and again at 2 and 4 weeks for advice and remote device adjustments. Your therapy is evaluated by the device each day.   Use it every night. The more you are able to sleep naturally for 7-8 hours, the more likely you will have good sleep and to prevent health risks or symptoms from sleep apnea. Even if you use it 4 hours it helps. Occasionally all of us are unable to use a medical therapy, in sleep apnea, it is not dangerous to miss one night.   Communicate. Call our skilled team on the number provided on the first day if your visit for problems that make it difficult to wear the device. Over 2 out of 3 patients can learn to wear the device long-term with help from our team. Remember to call our team or your sleep providers if you are unable to wear the device as we may have other solutions for those who cannot adapt to mask CPAP therapy. It is recommended that you sleep your sleep provider within the first 3 months and yearly after that if you are not having problems.   Use it for your health. We encourage use of CPAP masks during daytime quiet periods to allow your face and brain to adapt to the sensation of CPAP so that it will be a more natural sensation to awaken to at night or during naps. This can be very useful during the first few weeks or months of adapting to CPAP though it does not help medically to  wear CPAP during wakefulness and  should not be used as a strategy just to meet guidelines.  Take care of your equipment. Make sure you clean your mask and tubing using directions every day and that your filter and mask are replaced as recommended or if they are not working.     *Masks with magnets:  Updated Contraindications  Masks with magnetic components are contraindicated for use by patients where they, or anyone in close physical contact while using the mask, have the following:   Active medical implants that interact with magnets (i.e., pacemakers, implantable cardioverter defibrillators (ICD), neurostimulators, cerebrospinal fluid (CSF) shunts, insulin/infusion pumps)   Metallic implants/objects containing ferromagnetic material (i.e., aneurysm clips/flow disruption devices, embolic coils, stents, valves, electrodes, implants to restore hearing or balance with implanted magnets, ocular implants, metallic splinters in the eye)  Updated Warning  Keep the mask magnets at a safe distance of at least 6 inches (150 mm) away from implants or medical devices that may be adversely affected by magnetic interference. This warning applies to you or anyone in close physical contact with your mask. The magnets are in the frame and lower headgear clips, with a magnetic field strength of up to 400mT. When worn, they connect to secure the mask but may inadvertently detach while asleep.  Implants/medical devices, including those listed within contraindications, may be adversely affected if they change function under external magnetic fields or contain ferromagnetic materials that attract/repel to magnetic fields (some metallic implants, e.g., contact lenses with metal, dental implants, metallic cranial plates, screws, shayna hole covers, and bone substitute devices). Consult your physician and  of your implant / other medical device for information on the potential adverse effects of magnetic fields.    BESIDES  CPAP, WHAT OTHER THERAPIES ARE THERE?    Positioning Device  Positioning devices are generally used when sleep apnea is mild and only occurs on your back.This example shows a pillow that straps around the waist. It may be appropriate for those whose sleep study shows milder sleep apnea that occurs primarily when lying flat on one's back. Preliminary studies have shown benefit but effectiveness at home may need to be verified by a home sleep test. These devices are generally not covered by medical insurance.  Examples of devices that maintain sleeping on the back to prevent snoring and mild sleep apnea.    Belt type body positioner  http://[email or web address]    Electronic reminder  http://nightshifttherapy.com/            Oral Appliance  What is oral appliance therapy?  An oral appliance device fits on your teeth at night like a retainer used after having braces. The device is made by a specialized dentist and requires several visits over 1-2 months before a manufactured device is made to fit your teeth and is adjusted to prevent your sleep apnea. Once an oral device is working properly, snoring should be improved. A home sleep test may be recommended at that time if to determine whether the sleep apnea is adequately treated.       Some things to remember:  -Oral devices are often, but not always, covered by your medical insurance. Be sure to check with your insurance provider.   -If you are referred for oral therapy, you will be given a list of specialized dentists to consider or you may choose to visit the Web site of the American Academy of Dental Sleep Medicine  -Oral devices are less likely to work if you have severe sleep apnea or are extremely overweight.     More detailed information  An oral appliance is a small acrylic device that fits over the upper and lower teeth  (similar to a retainer or a mouth guard). This device slightly moves jaw forward, which moves the base of the tongue forward, opens the airway,  improves breathing for effective treat snoring and obstructive sleep apnea in perhaps 7 out of 10 people .  The best working devices are custom-made by a dental device  after a mold is made of the teeth 1, 2, 3.  When is an oral appliance indicated?  Oral appliance therapy is recommended as a first-line treatment for patients with primary snoring, mild sleep apnea, and for patients with moderate sleep apnea who prefer appliance therapy to use of CPAP4, 5. Severity of sleep apnea is determined by sleep testing and is based on the number of respiratory events per hour of sleep.   How successful is oral appliance therapy?  The success rate of oral appliance therapy in patients with mild sleep apnea is 75-80% while in patients with moderate sleep apnea it is 50-70%. The chance of success in patients with severe sleep apnea is 40-50%. The research also shows that oral appliances have a beneficial effect on the cardiovascular health of IDA patients at the same magnitude as CPAP therapy7.  Oral appliances should be a second-line treatment in cases of severe sleep apnea, but if not completely successful then a combination therapy utilizing CPAP plus oral appliance therapy may be effective. Oral appliances tend to be effective in a broad range of patients although studies show that the patients who have the highest success are females, younger patients, those with milder disease, and less severe obesity. 3, 6.   Finding a dentist that practices dental sleep medicine  Specific training is available through the American Academy of Dental Sleep Medicine for dentists interested in working in the field of sleep. To find a dentist who is educated in the field of sleep and the use of oral appliances, near you, visit the Web site of the American Academy of Dental Sleep Medicine.    References  1. andres Kamara al. Objectively measured vs self-reported compliance during oral appliance therapy for sleep-disordered  breathing. Chest 2013; 144(5): 3757-0737.  2. Donta, et al. Objective measurement of compliance during oral appliance therapy for sleep-disordered breathing. Thorax 2013; 68(1): 91-96.  3. Laine et al. Mandibular advancement devices in 620 men and women with IDA and snoring: tolerability and predictors of treatment success. Chest 2004; 125: 6293-5692.  4. Otilia, et al. Oral appliances for snoring and IDA: a review. Sleep 2006; 29: 244-262.  5. Shantelle et al. Oral appliance treatment for IDA: an update. J Clin Sleep Med 2014; 10(2): 215-227.  6. Faraz et al. Predictors of OSAH treatment outcome. J Dent Res 2007; 86: 2588-5264.      Weight Loss:   Your Body mass index is 33.86 kg/m .    Being overweight does not necessarily mean you will have health consequences.  Those who have BMI over 35 or over 27 with existing medical conditions carries greater risk.   Weight loss decreases severity of sleep apnea in most people with obesity. For those with mild obesity who have developed snoring with weight gain, even 15-30 pound weight loss can improve and occasionally milder eliminate sleep apnea.  Structured and life-long dietary and health habits are necessary to lose weight and keep healthier weight levels.     The Comprehensive Weight loss program offers all aspects of weight loss strategies including two Non-Surgical Weight Loss Programs: Medical Weight Management and our 24 Week Healthy Lifestyle Program:    Medical Weight Management: You will meet with a Medical Weight Management Provider, as well as a Registered Dietician. The program may include medication therapy, dietary education, recommended exercise and physical therapy programs, monthly support group meetings, and possible psychological counseling. Follow up visits with the provider or dietician are scheduled based on your progress and needs.    24 Week Healthy Lifestyle Program: This unique program is designed to give you the support of  weekly appointments and activities thru a 24-week period. It may include all of the components of the basic program (above), with the addition of 11 individual Health  Visits, 24-week access to the [employer] website for over 700 online classes, and monthly support group meetings. This program has an out-of-pocket expense of $499 to cover the items that can not be billed to insurance (health coaches and [employer] access), and is non-refundable/non-transferable (you may be able to use a Health Savings Account; ask your HSA provider). There may be an optional meal replacement plan prescribed as well.   Surgical management achieves meaningful long-term weight loss and improvement in health risks in most patients with more severe obesity.      Sleep Apnea Surgery:    Surgery for obstructive sleep apnea is considered generally only when other therapies fail to work. Surgery may be discussed with you if you are having a difficult time tolerating CPAP and or when there is an abnormal structure that requires surgical correction.  Nose and throat surgeries often enlarge the airway to prevent collapse.  Most of these surgeries create pain for 1-2 weeks and up to half of the most common surgeries are not effective throughout life.  You should carefully discuss the benefits and drawbacks to surgery with your sleep provider and surgeon to determine if it is the best solution for you.   More information  Surgery for IDA is directed at areas that are responsible for narrowing or complete obstruction of the airway during sleep.  There are a wide range of procedures available to enlarge and/or stabilize the airway to prevent blockage of breathing in the three major areas where it can occur: the palate, tongue, and nasal regions.  Successful surgical treatment depends on the accurate identification of the factors responsible for obstructive sleep apnea in each person.  A personalized approach is required because there is no  single treatment that works well for everyone.  Because of anatomic variation, consultation with an examination by a sleep surgeon is a critical first step in determining what surgical options are best for each patient.  In some cases, examination during sedation may be recommended in order to guide the selection of procedures.  Patients will be counseled about risks and benefits as well as the typical recovery course after surgery. Surgery is typically not a cure for a person s IDA.  However, surgery will often significantly improve one s IDA severity (termed  success rate ).  Even in the absence of a cure, surgery will decrease the cardiovascular risk associated with OSA7; improve overall quality of life8 (sleepiness, functionality, sleep quality, etc).      Palate Procedures:  Patients with IDA often have narrowing of their airway in the region of their tonsils and uvula.  The goals of palate procedures are to widen the airway in this region as well as to help the tissues resist collapse.  Modern palate procedure techniques focus on tissue conservation and soft tissue rearrangement, rather than tissue removal.  Often the uvula is preserved in this procedure. Residual sleep apnea is common in patient after pharyngoplasty with an average reduction in sleep apnea events of 33%2.      Tongue Procedures:  ExamWhile patients are awake, the muscles that surround the throat are active and keep this region open for breathing. These muscles relax during sleep, allowing the tongue and other structures to collapse and block breathing.  There are several different tongue procedures available.  Selection of a tongue base procedure depends on characteristics seen on physical exam.  Generally, procedures are aimed at removing bulky tissues in this area or preventing the back of the tongue from falling back during sleep.  Success rates for tongue surgery range from 50-62%3.    Hypoglossal Nerve Stimulation:  Hypoglossal nerve  stimulation has recently received approval from the [place] Food and Drug Administration for the treatment of obstructive sleep apnea.  This is based on research showing that the system was safe and effective in treating sleep apnea6.  Results showed that the median AHI score decreased 68%, from 29.3 to 9.0. This therapy uses an implant system that senses breathing patterns and delivers mild stimulation to airway muscles, which keeps the airway open during sleep.  The system consists of three fully implanted components: a small generator (similar in size to a pacemaker), a breathing sensor, and a stimulation lead.  Using a small handheld remote, a patient turns the therapy on before bed and off upon awakening.    Candidates for this device must be greater than 18 years of age, have moderate to severe obstructive sleep apnea with less than 25% central events  (AHI between 15-65), BMI less than 35, have tried CPAP/oral appliance for at least 8 weeks without success, and have appropriate upper airway anatomy (determined by a sleep endoscopy performed by Dr. Oleg Barros or Dr. Nicola Murguia).    Nasal Procedures:  Nasal obstruction can interfere with nasal breathing during the day and night.  Studies have shown that relief of nasal obstruction can improve the ability of some patients to tolerate positive airway pressure therapy for obstructive sleep apnea1.  Treatment options include medications such as nasal saline, topical corticosteroid and antihistamine sprays, and oral medications such as antihistamines or decongestants. Non-surgical treatments can include external nasal dilators for selected patients. If these are not successful by themselves, surgery can improve the nasal airway either alone or in combination with these other options.        Combination Procedures:  Combination of surgical procedures and other treatments may be recommended, particularly if patients have more than one area of narrowing or  persistent positional disease.  The success rate of combination surgery ranges from 66-80%2,3.    References  Castro CARL. The Role of the Nose in Snoring and Obstructive Sleep Apnoea: An Update.  Eur Arch Otorhinolaryngol. 2011; 268: 1365-73.   Kenia SM; Sharron JA; Clark JR; Pallanch JF; Yamilka MB; Orlin SG; Phil VICK. Surgical modifications of the upper airway for obstructive sleep apnea in adults: a systematic review and meta-analysis. SLEEP 2010;33(10):8429-3842. Ilene CHO. Hypopharyngeal surgery in obstructive sleep apnea: an evidence-based medicine review.  Arch Otolaryngol Head Neck Surg. 2006 Feb;132(2):206-13.  Adarsh YH1, Maddy Y, Luis CHELSIE. The efficacy of anatomically based multilevel surgery for obstructive sleep apnea. Otolaryngol Head Neck Surg. 2003 Oct;129(4):327-35.  Ilene CHO, Goldberg A. Hypopharyngeal Surgery in Obstructive Sleep Apnea: An Evidence-Based Medicine Review. Arch Otolaryngol Head Neck Surg. 2006 Feb;132(2):206-13.  Cindy PJ et al. Upper-Airway Stimulation for Obstructive Sleep Apnea.  N Engl J Med. 2014 Jan 9;370(2):139-49.  Dex Y et al. Increased Incidence of Cardiovascular Disease in Middle-aged Men with Obstructive Sleep Apnea. Am J Respir Crit Care Med; 2002 166: 159-165  Bolanos EM et al. Studying Life Effects and Effectiveness of Palatopharyngoplasty (SLEEP) study: Subjective Outcomes of Isolated Uvulopalatopharyngoplasty. Otolaryngol Head Neck Surg. 2011; 144: 623-631.        WHAT IF I ONLY HAVE SNORING?    Mandibular advancement devices, lateral sleep positioning, long-term weight loss and treatment of nasal allergies have been shown to improve snoring.  Exercising tongue muscles with a game (https://apps.Brightpearl/us/cassy/soundly-reduce-snoring/vq1854843724) or stimulating the tongue during the day with a device (https://doi.org/10.3390/uyw82676532) have improved snoring in some  individuals.  https://www.[employer]/  https://www.sleepfoundation.org/best-anti-snoring-mouthpieces-and-mouthguards    Remember to Drive Safe... Drive Alive     Sleep health profoundly affects your health, mood, and your safety.  Thirty three percent of the population (one in three of us) is not getting enough sleep and many have a sleep disorder. Not getting enough sleep or having an untreated / undertreated sleep condition may make us sleepy without even knowing it. In fact, our driving could be dramatically impaired due to our sleep health. As your provider, here are some things I would like you to know about driving:     Here are some warning signs for impairment and dangerous drowsy driving:              -Having been awake more than 16 hours               -Looking tired               -Eyelid drooping              -Head nodding (it could be too late at this point)              -Driving for more than 30 minutes     Some things you could do to make the driving safer if you are experiencing some drowsiness:              -Stop driving and rest              -Call for transportation              -Make sure your sleep disorder is adequately treated     Some things that have been shown NOT to work when experiencing drowsiness while driving:              -Turning on the radio              -Opening windows              -Eating any  distracting  /  entertaining  foods (e.g., sunflower seeds, candy, or any other)              -Talking on the phone      Your decision may not only impact your life, but also the life of others. Please, remember to drive safe for yourself and all of us.

## 2025-01-29 NOTE — NURSING NOTE
Current patient location: [address] [place] [id number]    Is the patient currently in the state of [place]? YES    Visit mode: VIDEO    If the visit is dropped, the patient can be reconnected by:VIDEO VISIT: Text to cell phone:   Telephone Information:   Mobile [phone]       Will anyone else be joining the visit? NO  (If patient encounters technical issues they should call [phone]    Are changes needed to the allergy or medication list? No    Are refills needed on medications prescribed by this physician? NO    Rooming Documentation:  Questionnaire(s) completed    Reason for visit: Consult    Porsha MARTÍNEZ

## 2025-02-17 NOTE — PROGRESS NOTES
Pulmonary Clinic Consultation          Assessment/Plan:     54 year old male with a history of untreated IDA, CAD s/p stent, HTN, GERD, HLD, bicuspid aortic valve - presenting for evaluation of shortness of breath.      COPD-Asthma overlap  Environmental allergies  Obstructive sleep apnea  Former smoker with 18 pack year history, quit in 2007.  He presents to clinic today for evaluation of shortness of breath over the past year.  He does have a dry cough and noted wheezing when he is laying in bed at night.  He has a history of sleep apnea but it is currently untreated.   He established care with sleep medicine on 1/29/25, ordered sleep study, which is scheduled for May.  He has been evaluated by cardiology for SOB, echocardiogram 8/2024 was WNL.  Stress cardiac MRI with normal LV and RV size and function, EF 63%, no ischemia.   CXR clear in 7/2024.  Overread of cardiac MRI 5/2023 with normal lung parenchyma.  Eosinophils 0.3 in 7/2024.  He is concerned about environmental allergies, and he has allergy symptoms year round.   Pulmonary function testing with moderate airway obstruction (FEV1 64%) with bronchodilator response, air-trapping and hyperinflation, and diffusion capacity defect (DLCO 64%).   His airway obstruction may be due to his past smoking history, but he could also have overlapping features of reactive airways vs asthma, due to his reported history of year round allergies and bronchodilator response.      Plan:  - reviewed PFT results with patient today.  - chest CT to evaluate lung parenchyma due to diffusion capacity defect.   - blood work for allergies:  mid[place] allergen panel, IgE, CBC w/diff.  - start Symbicort (budesonide/formoterol) 160/4.5, two puffs BID, rinse/gargle after use.  If there are cost/coverage issues at the pharmacy, we can send Airduo to [facility] and use with [facility], this would be about $54/month.   - have sleep study done in May.   - he is UTD with COVID booster and annual  influenza vaccine.  - Action plan: prednisone 40mg x5 days, + azithromycin x5 days.       Follow-up:  - one month       Marimar Brasher CNP  Pulmonary Medicine  [facility]  [phone]       CC:     SOB     HPI:     54 year old male with a history of untreated IDA, CAD s/p stent, HTN, GERD, HLD, bicuspid aortic valve - presenting for evaluation of shortness of breath.      First noticed SOB about one year ago.  With activity, climbing stairs.   He is able to go on treadmill.  Mostly with slightly heavier exertion.   Does have dry cough, no mucous production.   Does notice wheezing/whistle when laying in bed at night.  No concerns with asthma when younger.  No frequent illness.   Worried about allergies.  Possibly dust.  Stuffy nose, runny nose, red eyes.  Year round.   Two dogs, possibly dog dander allergy.   Smoking hx:  started at age [age], quit 2007.  1PPD.   No other inhalations currently.  Did smoke cigars occasionally over the past 10 years.   Does have sleep apnea, was on cpap in past, didn't tolerate, stopped using.   May sleep study.       Medical records reviewed for this visit include PCP notes, cardiology notes.      ROS:     A 12-system review was obtained and was negative with the exception of the symptoms endorsed in the HPI.       Medical history:       PMH:  Past Medical History:   Diagnosis Date    Cardiac murmur     Coronary artery disease     GERD (gastroesophageal reflux disease)     High cholesterol     Hyperlipidemia     Hypertension        PSH:  Past Surgical History:   Procedure Laterality Date    CARDIAC CATHETERIZATION  07/31/2017    AUREA to distal RCA    CARDIAC CATHETERIZATION N/A 7/31/2017    Procedure: Coronary Angiogram;  Surgeon: Dandre Love MD;  Location: [facility] Cath Lab;  Service:     CORONARY STENT PLACEMENT      CV CORONARY ANGIOGRAM N/A 3/2/2021    Procedure: Coronary Angiogram;  Surgeon: Marivel Loo MD;  Location:  [facility] Cardiac Cath Lab;  Service: Cardiology    CV CORONARY ANGIOGRAM N/A 10/17/2022    Procedure: Coronary Angiogram;  Surgeon: Manuel Mclain MD;  Location: [facility] CV    CV FRACTIONAL FLOW RATIO WIRE N/A 10/17/2022    Procedure: Fractional Flow Ratio Wire;  Surgeon: Manuel Mclain MD;  Location: [facility] CV    CV LEFT HEART CATH N/A 10/17/2022    Procedure: Left Heart Catheterization;  Surgeon: Manuel Mclain MD;  Location: [facility] CV    CV LEFT HEART CATHETERIZATION WITHOUT LEFT VENTRICULOGRAM Left 3/2/2021    Procedure: Left Heart Catheterization Without Left Ventriculogram;  Surgeon: Marivel Loo MD;  Location: [facility];  Service: Cardiology    CV PCI ANGIOPLASTY N/A 10/17/2022    Procedure: Percutaneous Coronary Intervention - Angioplasty;  Surgeon: Manuel Mclain MD;  Location: [facility] CV    CV PCI STENT DRUG ELUTING N/A 10/17/2022    Procedure: Percutaneous Coronary Intervention Stent;  Surgeon: Manuel Mclain MD;  Location: [facility] CV    FRACTURE SURGERY      HERNIA REPAIR      VA CATH PLMT L HRT & ARTS W/NJX & ANGIO IMG S&I Left 7/31/2017    Procedure: Left Heart Catheterization Without Left Ventriculogram;  Surgeon: Dandre Love MD;  Location: [facility] Cath Lab;  Service: Cardiology    RELEASE CARPAL TUNNEL Right        Allergies:  Allergies   Allergen Reactions    Lisinopril Cough       Family Hx:  No family history on file.    Social Hx:  Social History     Socioeconomic History    Marital status:      Spouse name: Not on file    Number of children: Not on file    Years of education: Not on file    Highest education level: Not on file   Occupational History    Not on file   Tobacco Use    Smoking status: Former     Current packs/day: 0.00     Average packs/day: 1 pack/day for 18.0 years (18.0 ttl pk-yrs)     Types: Cigarettes, Cigars     Start date: 1989     Quit date: 2007     Years since  quittin.1     Passive exposure: Past (as a child)    Smokeless tobacco: Never    Tobacco comments:     still occasionally smokes cigars   Vaping Use    Vaping status: Never Used   Substance and Sexual Activity    Alcohol use: No    Drug use: Not Currently    Sexual activity: Not on file   Other Topics Concern    Not on file   Social History Narrative    Not on file     Social Drivers of Health     Financial Resource Strain: Not on file   Food Insecurity: Not on file   Transportation Needs: Not on file   Physical Activity: Not on file   Stress: Not on file   Social Connections: Not on file   Interpersonal Safety: Not on file   Housing Stability: Not on file       Current Meds:  Current Outpatient Medications   Medication Sig Dispense Refill    aspirin (ASA) 81 MG EC tablet TAKE 1 TABLET (81 MG) BY MOUTH DAILY START TOMORROW. 90 tablet 2    atorvastatin (LIPITOR) 80 MG tablet Take 1 tablet (80 mg) by mouth daily 90 tablet 3    budesonide-formoterol (SYMBICORT/BREYNA) 160-4.5 MCG/ACT Inhaler Inhale 2 puffs into the lungs 2 times daily. 10.2 g 11    buPROPion (WELLBUTRIN SR) 100 MG 12 hr tablet Take 100 mg by mouth daily      isosorbide mononitrate (IMDUR) 30 MG 24 hr tablet Take 1 tablet (30 mg) by mouth daily. 90 tablet 0    melatonin 3 MG tablet Take 3 mg by mouth nightly as needed.      metoprolol succinate ER (TOPROL XL) 25 MG 24 hr tablet TAKE 0.5 TABLETS BY MOUTH 2 TIMES DAILY. 90 tablet 3    omeprazole (PRILOSEC) 20 MG capsule [OMEPRAZOLE (PRILOSEC) 20 MG CAPSULE] Take 1 capsule (20 mg total) by mouth at bedtime. 30 capsule 0    polyvinyl alcohol/povidone (CLEAR EYES NATURAL TEARS OPHT) [POLYVINYL ALCOHOL/POVIDONE (CLEAR EYES NATURAL TEARS OPHT)] Apply 2 drops to eye 3 (three) times a day as needed.      ranolazine (RANEXA) 500 MG 12 hr tablet Take 1 tablet (500 mg) by mouth 2 times daily 180 tablet 3    sodium chloride (OCEAN) 0.65 % nasal spray [SODIUM CHLORIDE (OCEAN) 0.65 % NASAL SPRAY] Apply 2 sprays  "into each nostril as needed for congestion.      tadalafil (CIALIS) 20 MG tablet Take 1 tablet (20 mg) by mouth daily as needed 30 tablet 0    zolpidem (AMBIEN) 10 MG tablet Take tablet by mouth 15 minutes prior to sleep, for Sleep Study (Patient not taking: Reported on 2/18/2025) 1 tablet 0          Physical Exam:     /66   Pulse 75   Ht 1.778 m (5' 10\")   Wt 107 kg (236 lb)   SpO2 92%   BMI 33.86 kg/m    Gen: adult male, appears in NAD  HEENT: clear conjunctivae, moist mucous membranes  CV: RRR, no M/G/R  Resp: CTAB, no focal crackles or wheezes.  Respirations even and unlabored.  On RA.  No cough.   Skin: no apparent rashes on visible skin  Ext: no cyanosis, clubbing or edema  Neuro: alert and answering questions appropriately       Data:     Labs:  reviewed    Imaging studies:  I have personally reviewed all pertinent imaging studies and PFT results unless otherwise noted.    No results found for this or any previous visit (from the past 744 hours).      Pulmonary Function Testing    2/18/25:    "

## 2025-02-18 ENCOUNTER — OFFICE VISIT (OUTPATIENT)
Dept: PULMONOLOGY | Facility: CLINIC | Age: 55
End: 2025-02-18
Attending: FAMILY MEDICINE
Payer: COMMERCIAL

## 2025-02-18 VITALS
HEART RATE: 75 BPM | DIASTOLIC BLOOD PRESSURE: 66 MMHG | HEIGHT: 70 IN | WEIGHT: 236 LBS | SYSTOLIC BLOOD PRESSURE: 130 MMHG | BODY MASS INDEX: 33.79 KG/M2 | OXYGEN SATURATION: 92 %

## 2025-02-18 DIAGNOSIS — J44.89 ASTHMA-COPD OVERLAP SYNDROME (H): Primary | ICD-10-CM

## 2025-02-18 DIAGNOSIS — R06.02 SOB (SHORTNESS OF BREATH): ICD-10-CM

## 2025-02-18 DIAGNOSIS — G47.33 OSA (OBSTRUCTIVE SLEEP APNEA): ICD-10-CM

## 2025-02-18 DIAGNOSIS — Z91.09 ENVIRONMENTAL ALLERGIES: ICD-10-CM

## 2025-02-18 LAB
DLCOCOR-%PRED-PRE: 64 %
DLCOCOR-PRE: 17.98 ML/MIN/MMHG
DLCOUNC-%PRED-PRE: 62 %
DLCOUNC-PRE: 17.45 ML/MIN/MMHG
DLCOUNC-PRED: 27.73 ML/MIN/MMHG
ERV-%PRED-PRE: 29 %
ERV-PRE: 0.47 L
ERV-PRED: 1.57 L
EXPTIME-PRE: 12.09 SEC
FEF2575-%PRED-POST: 44 %
FEF2575-%PRED-PRE: 30 %
FEF2575-POST: 1.34 L/SEC
FEF2575-PRE: 0.92 L/SEC
FEF2575-PRED: 3.03 L/SEC
FEFMAX-%PRED-PRE: 64 %
FEFMAX-PRE: 6.07 L/SEC
FEFMAX-PRED: 9.35 L/SEC
FEV1-%PRED-PRE: 62 %
FEV1-PRE: 2.14 L
FEV1FEV6-PRE: 58 %
FEV1FEV6-PRED: 80 %
FEV1FVC-PRE: 52 %
FEV1FVC-PRED: 79 %
FEV1SVC-PRE: 46 %
FEV1SVC-PRED: 75 %
FIFMAX-PRE: 6.89 L/SEC
FRCPLETH-%PRED-PRE: 138 %
FRCPLETH-PRE: 4.86 L
FRCPLETH-PRED: 3.51 L
FVC-%PRED-PRE: 95 %
FVC-PRE: 4.13 L
FVC-PRED: 4.31 L
HGB BLD-MCNC: 13.6 G/DL
IC-%PRED-PRE: 123 %
IC-PRE: 3.9 L
IC-PRED: 3.15 L
RVPLETH-%PRED-PRE: 179 %
RVPLETH-PRE: 4.13 L
RVPLETH-PRED: 2.29 L
TLCPLETH-%PRED-PRE: 125 %
TLCPLETH-PRE: 8.76 L
TLCPLETH-PRED: 6.96 L
VA-%PRED-PRE: 112 %
VA-PRE: 7.23 L
VC-%PRED-PRE: 102 %
VC-PRE: 4.63 L
VC-PRED: 4.52 L

## 2025-02-18 PROCEDURE — 99204 OFFICE O/P NEW MOD 45 MIN: CPT | Mod: 25 | Performed by: NURSE PRACTITIONER

## 2025-02-18 PROCEDURE — 94729 DIFFUSING CAPACITY: CPT

## 2025-02-18 PROCEDURE — 94726 PLETHYSMOGRAPHY LUNG VOLUMES: CPT

## 2025-02-18 PROCEDURE — 94060 EVALUATION OF WHEEZING: CPT

## 2025-02-18 PROCEDURE — 85018 HEMOGLOBIN: CPT | Mod: QW

## 2025-02-18 RX ORDER — BUDESONIDE AND FORMOTEROL FUMARATE DIHYDRATE 160; 4.5 UG/1; UG/1
2 AEROSOL RESPIRATORY (INHALATION) 2 TIMES DAILY
Qty: 10.2 G | Refills: 11 | Status: SHIPPED | OUTPATIENT
Start: 2025-02-18

## 2025-02-18 NOTE — PROGRESS NOTES
Patient oxygen saturation on RA at rest is 92% pulse 75  Oxygen saturation after ambulating 400ft on RA is 92% pulse 74        Patient is ambulatory within his/her home.      Jessica Reyes LPN

## 2025-02-18 NOTE — PATIENT INSTRUCTIONS
It was a pleasure to see you in clinic today.   Here is what we discussed:    Start Symbicort two puffs twice daily, rinse/gargle after use.  Let us know if there are cost/coverage issues at the pharmacy, and then we   Have the chest CT done.   Have the blood work done.   Call my nurse, Giancarlo ([phone]) with any change or worsening of your breathing.  Follow-up in one month.     Marimar Brasher, CNP  Pulmonary Medicine  [facility]  [phone]

## 2025-02-26 ENCOUNTER — HOSPITAL ENCOUNTER (OUTPATIENT)
Dept: CT IMAGING | Facility: HOSPITAL | Age: 55
Discharge: HOME | End: 2025-02-26
Attending: NURSE PRACTITIONER
Payer: COMMERCIAL

## 2025-02-26 DIAGNOSIS — R06.02 SOB (SHORTNESS OF BREATH): ICD-10-CM

## 2025-02-26 DIAGNOSIS — J44.89 ASTHMA-COPD OVERLAP SYNDROME (H): ICD-10-CM

## 2025-02-26 PROCEDURE — 71250 CT THORAX DX C-: CPT

## 2025-03-17 NOTE — PROGRESS NOTES
Medication Therapy Management (MTM) Encounter    ASSESSMENT:                            Medication Adherence/Access: See below for considerations.    Weight Management  would benefit from starting GLP-1 Wegovy.  Has CAD and IDA, will try for Wegovy coverage first and try appeal letter if denied. Could also try Zepbound with IDA indication.   Provided injection education today, patient verbalized understanding of injection technique.  Discussed adding fiber supplement to diet to help with constipation/diarrhea associated with GLP-1s.     Hypertension /CAD/ stenting x 3  Blood pressure at goal <130/80 today, no changes.     Asthma   Stable. Managed by pulm. Encouraged him to call them if copays are too expensive next month     GERD    stable    Anxiety  stable      ED:   stable    PLAN:                            Start Wegovy: It is a subcutaneous injection that you inject once weekly and titrate the dose slowly over time. Make sure inject the same time each day of the week, for example Monday evenings.  Each pen is single use.  In each prescription, you will get 4 pens in each box.  You will need a new prescription for each strength of the medication.  Week 1-4: Inject 0.25 mg once weekly  Week 5-8: If tolerating, increase to 0.5 mg once weekly    Should consider appeal for CAD/IDA if not covered    Follow-up: 4 weeks to check on Wegovy start or discuss alternates if not covered     SUBJECTIVE/OBJECTIVE:                          Taurus Cotto is a 54 year old male seen for an initial visit. He was referred to me from Nita Ratliff.      Reason for visit: morbid obesity with CAD, IDA, Dyspnea on exertion    Allergies/ADRs: Reviewed in chart  Past Medical History: Reviewed in chart  Tobacco: He reports that he quit smoking about 18 years ago. His smoking use included cigarettes and cigars. He started smoking about 36 years ago. He has a 18 pack-year smoking history. He has been exposed to tobacco smoke. He has never  used smokeless tobacco.  Alcohol: Less than 1 beverage / month    Medication Adherence/Access:   Does do [facility] mail order pharmacy for 90 days  In [facility] pharmacy is [facility] [address], not sure if insurance would cover     Weight Management     Heart attack hx: No: but stenting   Obstructive sleep apnea dx: Yes: sleep study in May  Test claims:   pending    Nutrition/Eating Habits: feels hungry all the time- has felt this way since he was a kid  Breakfast- peanut butter toast or shake  Lunch- leftovers / sandwich   Dinner - wife cooks- extra portion   Eating an extra meal later at night- carb craving. Chips/ veggie straws     Exercise/Activity: minimal  Medications Tried/Failed:  Bupropion: on currently for mood    Initial Consult Weight: 240 lb          Hypertension /CAD/ stenting x 3  -Aspirin 81 MG Tablet Delayed Release 1 tab(s) orally once a day.  -Atorvastatin Calcium 80 mg Tablet TAKE 1 TABLET DAILY.  -Isosorbide Mononitrate ER 30 MG Tablet Extended Release 24 Hour TAKE 1 TABLET BY MOUTH EVERY DAY Oral.  -Metoprolol Succinate ER 25 mg Tablet Extended Release 24 Hour TAKE 1/2 po twice daily   -Ranolazine ER 1000 MG Tablet Extended Release 12 Hour 1 tablet Orally Twice a day.    Follow with cardiology - 95% blockage   2x after a stress test  Patient reports no current medication side effects  Patient does not self-monitor blood pressure.         Asthma   -symbicort 160-4.5 mcg 2 puffs twice daily     Sees [facility] pulmonology   This is helping with symptoms. Was covered for $0 copay, worried it would be more expensive next month          GERD    -PriLOSEC OTC 20 MG Tablet Delayed Release 1 tab(s) orally daily.  Patient reports no current symptoms.   Patient feels that current regimen is effective.       Mental Health   Anxiety  -buPROPion HCl ER (SR) 100 MG Tablet Extended Release 12 Hour TAKE ONE TABLET BY MOUTH once daily  Patient reports no current medication side effects.  Patient reports symptoms are stable.         ED:   -Tadalafil 20 MG Tablet 1 tablet orally as directed.  Does avoid use with nitrates.     Today's Vitals: /72   Wt 240 lb (108.9 kg)   BMI 34.44 kg/m    ----------------      I spent 45 minutes with this patient today. All changes were made via collaborative practice agreement with Nita Ratliff MD.     A summary of these recommendations was given to the patient.    Sade Engle, Pharm.D.  Medication Therapy Management Pharmacist           Medication Therapy Recommendations  Class 1 obesity due to excess calories without serious comorbidity with body mass index (BMI) of 32.0 to 32.9 in adult   1 Rationale: Untreated condition - Needs additional medication therapy - Indication   Recommendation: Start Medication - Wegovy 0.25 MG/0.5ML Soaj   Status: Accepted per CPA   Identified Date: 3/18/2025 Completed Date: 3/18/2025

## 2025-03-18 ENCOUNTER — OFFICE VISIT (OUTPATIENT)
Dept: PHARMACY | Facility: PHYSICIAN GROUP | Age: 55
End: 2025-03-18
Payer: COMMERCIAL

## 2025-03-18 VITALS — SYSTOLIC BLOOD PRESSURE: 128 MMHG | BODY MASS INDEX: 34.44 KG/M2 | WEIGHT: 240 LBS | DIASTOLIC BLOOD PRESSURE: 72 MMHG

## 2025-03-18 DIAGNOSIS — I25.83 CORONARY ARTERIOSCLEROSIS DUE TO LIPID RICH PLAQUE: ICD-10-CM

## 2025-03-18 DIAGNOSIS — N52.9 ERECTILE DYSFUNCTION, UNSPECIFIED ERECTILE DYSFUNCTION TYPE: ICD-10-CM

## 2025-03-18 DIAGNOSIS — E66.811 CLASS 1 OBESITY DUE TO EXCESS CALORIES WITHOUT SERIOUS COMORBIDITY WITH BODY MASS INDEX (BMI) OF 32.0 TO 32.9 IN ADULT: Primary | ICD-10-CM

## 2025-03-18 DIAGNOSIS — K21.9 GERD WITHOUT ESOPHAGITIS: ICD-10-CM

## 2025-03-18 DIAGNOSIS — I10 BENIGN ESSENTIAL HTN: ICD-10-CM

## 2025-03-18 DIAGNOSIS — G47.33 OSA (OBSTRUCTIVE SLEEP APNEA): ICD-10-CM

## 2025-03-18 DIAGNOSIS — F41.9 ANXIETY: ICD-10-CM

## 2025-03-18 DIAGNOSIS — E66.09 CLASS 1 OBESITY DUE TO EXCESS CALORIES WITHOUT SERIOUS COMORBIDITY WITH BODY MASS INDEX (BMI) OF 32.0 TO 32.9 IN ADULT: Primary | ICD-10-CM

## 2025-03-18 DIAGNOSIS — R06.09 DOE (DYSPNEA ON EXERTION): ICD-10-CM

## 2025-03-18 PROCEDURE — 3074F SYST BP LT 130 MM HG: CPT | Performed by: PHARMACIST

## 2025-03-18 PROCEDURE — 99607 MTMS BY PHARM ADDL 15 MIN: CPT | Performed by: PHARMACIST

## 2025-03-18 PROCEDURE — 3078F DIAST BP <80 MM HG: CPT | Performed by: PHARMACIST

## 2025-03-18 PROCEDURE — 99605 MTMS BY PHARM NP 15 MIN: CPT | Performed by: PHARMACIST

## 2025-03-18 NOTE — PATIENT INSTRUCTIONS
Recommendations from today's MTM visit:                                                      MTM (medication therapy management) is a service provided by a clinical pharmacist designed to help you get the most of out of your medicines.      Wegovy Dosing:   Start Wegovy: It is a subcutaneous injection that you inject once weekly and titrate the dose slowly over time. Make sure inject the same time each day of the week, for example Monday evenings.  Each pen is single use.  In each prescription, you will get 4 pens in each box.  You will need a new prescription for each strength of the medication.  Week 1-4: Inject 0.25 mg once weekly  Week 5-8: If tolerating, increase to 0.5 mg once weekly  Week 9-12: If tolerating, increase to 1 mg once weekly    *If you are having some nausea or other side effects to where you are hesitant to move up to the next dose, stay at the same dose you are on for an additional week to see if side effect(s) improves/resolves. Make sure to take this time to hydrate and ensure you are drinking at least 64 oz water per day.  If you are wanting to stay at the same dose and do not have additional refills on that prescription please reach out to the clinic.    The goal is to get to a dose that is well tolerated and effective for you. You do not have to go up on the dose each month or get to maximum dose if getting an effective response with minimal side effects.     Wegovy Administration Video: Video that was created by the .  https://www.[employer].com/watch?v=[email or web address]    Wegovy Storage and Stability:   Make sure that when you get the prescription that you store the prescription in the refrigerator until it is time to use the Wegovy pen.  Once it is time to use the Wegovy pen, you can keep the pen at room temperature and it is good for up to 28 days at room temperature.     Wegovy Common Side Effects:   Nausea, diarrhea, constipation, headache, tiredness (fatigue), dizziness, stomach  "upset/pain. Less commonly, Wegovy can cause low blood sugar (symptoms: shaky, dizzy, sweaty, agitation). Please reach out to the care team should you feel like this is occurring. It is important to ensure that you are eating consistent meals and not skipping meals. Ensure you are getting at least 64 oz water daily.     Follow-up:   Appointments in Next Year      Apr 16, 2025 8:00 AM  Pharmacist Visit with Sade Engle [facility] ([facility] ) [phone]     It was great speaking with you today.  I value your experience and would be very thankful for your time in providing feedback in our clinic survey. In the next few days, you may receive an email or text message from [employer] with a link to a survey related to your  clinical pharmacist.\"     To schedule another [facility] appointment, please call the clinic directly or you may call [phone]    My Clinical Pharmacist's contact information:                                                      Please feel free to contact me with any questions or concerns you have. Use the patient portal - address to your provider with subject line \"to Sade\" or use [employer] to send me a message.      Sade Engle, Pharm.D.  Medication Therapy Management Pharmacist  [facility]  My direct line: [phone]   "

## 2025-03-19 ENCOUNTER — LAB (OUTPATIENT)
Dept: LAB | Facility: CLINIC | Age: 55
End: 2025-03-19
Payer: COMMERCIAL

## 2025-03-19 DIAGNOSIS — Z91.09 ENVIRONMENTAL ALLERGIES: ICD-10-CM

## 2025-03-19 DIAGNOSIS — E83.19 OTHER DISORDERS OF IRON METABOLISM: ICD-10-CM

## 2025-03-19 LAB
BASOPHILS # BLD AUTO: 0 10E3/UL (ref 0–0.2)
BASOPHILS NFR BLD AUTO: 1 %
EOSINOPHIL # BLD AUTO: 0.2 10E3/UL (ref 0–0.7)
EOSINOPHIL NFR BLD AUTO: 2 %
ERYTHROCYTE [DISTWIDTH] IN BLOOD BY AUTOMATED COUNT: 12.3 % (ref 10–15)
FERRITIN SERPL-MCNC: 105 NG/ML (ref 31–409)
HCT VFR BLD AUTO: 42.6 % (ref 40–53)
HGB BLD-MCNC: 14.2 G/DL (ref 13.3–17.7)
IMM GRANULOCYTES # BLD: 0 10E3/UL
IMM GRANULOCYTES NFR BLD: 1 %
IRON BINDING CAPACITY (ROCHE): 315 UG/DL (ref 240–430)
IRON SATN MFR SERPL: 27 % (ref 15–46)
IRON SERPL-MCNC: 86 UG/DL (ref 61–157)
LYMPHOCYTES # BLD AUTO: 2.5 10E3/UL (ref 0.8–5.3)
LYMPHOCYTES NFR BLD AUTO: 31 %
MCH RBC QN AUTO: 28.8 PG (ref 26.5–33)
MCHC RBC AUTO-ENTMCNC: 33.3 G/DL (ref 31.5–36.5)
MCV RBC AUTO: 86 FL (ref 78–100)
MONOCYTES # BLD AUTO: 0.6 10E3/UL (ref 0–1.3)
MONOCYTES NFR BLD AUTO: 7 %
NEUTROPHILS # BLD AUTO: 4.9 10E3/UL (ref 1.6–8.3)
NEUTROPHILS NFR BLD AUTO: 59 %
PLATELET # BLD AUTO: 236 10E3/UL (ref 150–450)
RBC # BLD AUTO: 4.93 10E6/UL (ref 4.4–5.9)
WBC # BLD AUTO: 8.2 10E3/UL (ref 4–11)

## 2025-03-20 LAB
A ALTERNATA IGE QN: 0.13 KU(A)/L
A FUMIGATUS IGE QN: <0.1 KU(A)/L
BERMUDA GRASS IGE QN: <0.1 KU(A)/L
C HERBARUM IGE QN: <0.1 KU(A)/L
CAT DANDER IGG QN: <0.1 KU(A)/L
CEDAR IGE QN: <0.1 KU(A)/L
COMMON RAGWEED IGE QN: <0.1 KU(A)/L
COTTONWOOD IGE QN: <0.1 KU(A)/L
D FARINAE IGE QN: <0.1 KU(A)/L
D PTERONYSS IGE QN: <0.1 KU(A)/L
DOG DANDER+EPITH IGE QN: <0.1 KU(A)/L
IGE SERPL-ACNC: 16 KU/L (ref 0–114)
MAPLE IGE QN: <0.1 KU(A)/L
MARSH ELDER IGE QN: <0.1 KU(A)/L
MOUSE URINE PROT IGE QN: <0.1 KU(A)/L
NETTLE IGE QN: <0.1 KU(A)/L
P NOTATUM IGE QN: <0.1 KU(A)/L
ROACH IGE QN: <0.1 KU(A)/L
SALTWORT IGE QN: <0.1 KU(A)/L
SILVER BIRCH IGE QN: <0.1 KU(A)/L
TIMOTHY IGE QN: <0.1 KU(A)/L
WHITE ASH IGE QN: <0.1 KU(A)/L
WHITE ELM IGE QN: <0.1 KU(A)/L
WHITE MULBERRY IGE QN: <0.1 KU(A)/L
WHITE OAK IGE QN: <0.1 KU(A)/L

## 2025-03-28 ASSESSMENT — ASTHMA QUESTIONNAIRES
QUESTION_1 LAST FOUR WEEKS HOW MUCH OF THE TIME DID YOUR ASTHMA KEEP YOU FROM GETTING AS MUCH DONE AT WORK, SCHOOL OR AT HOME: A LITTLE OF THE TIME
QUESTION_3 LAST FOUR WEEKS HOW OFTEN DID YOUR ASTHMA SYMPTOMS (WHEEZING, COUGHING, SHORTNESS OF BREATH, CHEST TIGHTNESS OR PAIN) WAKE YOU UP AT NIGHT OR EARLIER THAN USUAL IN THE MORNING: ONCE A WEEK
QUESTION_5 LAST FOUR WEEKS HOW WOULD YOU RATE YOUR ASTHMA CONTROL: WELL CONTROLLED
ACT_TOTALSCORE: 18
QUESTION_4 LAST FOUR WEEKS HOW OFTEN HAVE YOU USED YOUR RESCUE INHALER OR NEBULIZER MEDICATION (SUCH AS ALBUTEROL): NOT AT ALL
QUESTION_2 LAST FOUR WEEKS HOW OFTEN HAVE YOU HAD SHORTNESS OF BREATH: ONCE A DAY
EMERGENCY_ROOM_LAST_YEAR_TOTAL: ONE

## 2025-03-31 ENCOUNTER — MYC REFILL (OUTPATIENT)
Dept: CARDIOLOGY | Facility: CLINIC | Age: 55
End: 2025-03-31
Payer: COMMERCIAL

## 2025-03-31 DIAGNOSIS — R06.09 DOE (DYSPNEA ON EXERTION): ICD-10-CM

## 2025-03-31 DIAGNOSIS — I10 BENIGN ESSENTIAL HYPERTENSION: ICD-10-CM

## 2025-03-31 DIAGNOSIS — I20.89 CHRONIC STABLE ANGINA: ICD-10-CM

## 2025-03-31 RX ORDER — ISOSORBIDE MONONITRATE 30 MG/1
30 TABLET, EXTENDED RELEASE ORAL DAILY
Qty: 90 TABLET | Refills: 0 | Status: SHIPPED | OUTPATIENT
Start: 2025-03-31

## 2025-04-02 ENCOUNTER — VIRTUAL VISIT (OUTPATIENT)
Dept: PULMONOLOGY | Facility: CLINIC | Age: 55
End: 2025-04-02
Attending: NURSE PRACTITIONER
Payer: COMMERCIAL

## 2025-04-02 DIAGNOSIS — J44.89 ASTHMA-COPD OVERLAP SYNDROME (H): Primary | ICD-10-CM

## 2025-04-02 DIAGNOSIS — R06.02 SOB (SHORTNESS OF BREATH): ICD-10-CM

## 2025-04-02 PROCEDURE — 98006 SYNCH AUDIO-VIDEO EST MOD 30: CPT | Performed by: NURSE PRACTITIONER

## 2025-04-02 PROCEDURE — 1126F AMNT PAIN NOTED NONE PRSNT: CPT | Mod: 95 | Performed by: NURSE PRACTITIONER

## 2025-04-02 NOTE — NURSING NOTE
Current patient location: [address] [place] [id number]    Is the patient currently in the state of [place]? YES    Visit mode:VIDEO    If the visit is dropped, the patient can be reconnected by: VIDEO VISIT: Send to e-mail at: [email or web address]    Will anyone else be joining the visit? NO  (If patient encounters technical issues they should call [phone]    How would you like to obtain your AVS? MyChart    Are changes needed to the allergy or medication list? Pt stated no changes to allergies and Pt stated no med changes    Are refills needed on medications prescribed by this physician? NO    Rooming Documentation:  Questionnaire(s) completed    Reason for visit: RECHECK     Rosey MARTÍNEZ

## 2025-04-02 NOTE — PATIENT INSTRUCTIONS
Continue Symbicort two puffs twice daily, rinse/gargle after use.  You can use this as needed as well, max 12 puffs/day.   Have the chest CT done in May to follow-up on areas of inflammation.  Call my nurse, Giancarlo ([phone]) with any change or worsening of your breathing.  Follow-up in 3 months virtually.    Marimar Brasher, CNP  Pulmonary Medicine  [facility]  [phone]

## 2025-04-02 NOTE — PROGRESS NOTES
Virtual Visit Details    Type of service:  Video Visit   Start time:  2:53pm  End time:  3:00pm  Originating Location (pt. Location): Home    Distant Location (provider location):  On-site  Platform used for Video Visit: [facility]         Pulmonary Clinic Follow-up          Assessment/Plan:     54 year old male with a history of COPD-Asthma overlap, untreated IDA, CAD s/p stent, HTN, GERD, HLD, bicuspid aortic valve - presenting for one month follow-up of shortness of breath.      COPD-Asthma overlap  Environmental allergies  Obstructive sleep apnea  Former smoker with 18 pack year history, quit in 2007.  He presented 2/2025 for evaluation of shortness of breath over the past year.  He does have a dry cough and noted wheezing when he is laying in bed at night.  He has a history of sleep apnea but it is currently untreated.   He established care with sleep medicine on 1/29/25, ordered sleep study, which is scheduled for May.  He has been evaluated by cardiology for SOB, echocardiogram 8/2024 was WNL.  Stress cardiac MRI with normal LV and RV size and function, EF 63%, no ischemia.   CXR clear in 7/2024.  Overread of cardiac MRI 5/2023 with normal lung parenchyma.  Eosinophils 0.3 in 7/2024.  He is concerned about environmental allergies, and he has allergy symptoms year round.   Pulmonary function testing with moderate airway obstruction (FEV1 64%) with bronchodilator response, air-trapping and hyperinflation, and diffusion capacity defect (DLCO 64%).   His airway obstruction may be due to his past smoking history, but he could also have overlapping features of reactive airways vs asthma, due to his reported history of year round allergies and bronchodilator response.    Last visit we started Symbicort, ordered a chest CT and blood work.  Chest CT with bands of GGOs and severe coronary artery calcifications.  Low reactivity to mold on allergen panel, with normal IgE (16) and normal eosinophils (0.2).   He feels his  breathing is improving with Symbicort.     Plan:  - repeat chest CT in May to follow-up on GGOs.  - continue Symbicort (budesonide/formoterol) 160/4.5, two puffs BID, rinse/gargle after use.  - have sleep study done in May.   - he is UTD with COVID booster and annual influenza vaccine.  - Action plan: prednisone 40mg x5 days, + azithromycin x5 days.       Follow-up:  - 3 months      Marimar Brasher CNP  Pulmonary Medicine  [facility]  [phone]       CC:     SOB follow-up     HPI:     54 year old male with a history of COPD-Asthma overlap, untreated IDA, CAD s/p stent, HTN, GERD, HLD, bicuspid aortic valve - presenting for one month follow-up of shortness of breath.      Thinks inhaler is working.  If he misses dose, he notices.  Mostly notices SOB with stairs, but has improved with inhaler.   Not very active due to breathing.  But now working a lot, so can't do much activity.  14 hour days.   Knows he needs to be more active.   Symbicort cost $0.       Previous HPI:  First noticed SOB about one year ago.  With activity, climbing stairs.   He is able to go on treadmill.  Mostly with slightly heavier exertion.   Does have dry cough, no mucous production.   Does notice wheezing/whistle when laying in bed at night.  No concerns with asthma when younger.  No frequent illness.   Worried about allergies.  Possibly dust.  Stuffy nose, runny nose, red eyes.  Year round.   Two dogs, possibly dog dander allergy.   Smoking hx:  started at age [age], quit 2007.  1PPD.   No other inhalations currently.  Did smoke cigars occasionally over the past 10 years.   Does have sleep apnea, was on cpap in past, didn't tolerate, stopped using.   May sleep study.       Patient supplied answers from flow sheet for:  COPD Assessment Test (CAT)  2009 [employer]. All rights reserved.      3/28/2025    12:21 PM   COPD assessment test (CAT)   Cough 1   Phlegm 0   Chest tightness 3   Walk up hill 3   Limited  activities 1   Leaving my home 0   Sleep 1   Energy 2   Total Score 11        Patient-reported      CAT Key:  The CAT consist of 8 items which are each scored 0-5. The total score ranges from 0-40 with higher scores representing a poorer health status. When interpreting CAT scores, the individual s disease severity should be considered.   Low impact  (1-9)  Medium impact  (10-20)  High impact  (21-30)  Very high impact  (31-40)        3/28/2025    12:23 PM   ACT Total Scores   ACT TOTAL SCORE (Goal Greater than or Equal to 20) 18    In the past 12 months, how many times did you visit the emergency room for your asthma without being admitted to the hospital? 1   In the past 12 months, how many times were you hospitalized overnight because of your asthma? 0       Patient-reported        ROS:     A 4-system review was obtained and was negative with the exception of the symptoms endorsed in the HPI.       Medical history:       PMH:  Past Medical History:   Diagnosis Date    Asthma-COPD overlap syndrome (H)     Cardiac murmur     Coronary artery disease     GERD (gastroesophageal reflux disease)     High cholesterol     Hyperlipidemia     Hypertension        PSH:  Past Surgical History:   Procedure Laterality Date    CARDIAC CATHETERIZATION  07/31/2017    AUREA to distal RCA    CARDIAC CATHETERIZATION N/A 7/31/2017    Procedure: Coronary Angiogram;  Surgeon: Dandre Love MD;  Location: [facility] Cath Lab;  Service:     CORONARY STENT PLACEMENT      CV CORONARY ANGIOGRAM N/A 3/2/2021    Procedure: Coronary Angiogram;  Surgeon: Marivel Loo MD;  Location: [facility] Cardiac Cath Lab;  Service: Cardiology    CV CORONARY ANGIOGRAM N/A 10/17/2022    Procedure: Coronary Angiogram;  Surgeon: Manuel Mclain MD;  Location: [facility] CV    CV FRACTIONAL FLOW RATIO WIRE N/A 10/17/2022    Procedure: Fractional Flow Ratio Wire;  Surgeon: Manuel Mclain MD;  Location: [facility] CATH LAB CV    CV LEFT HEART  CATH N/A 10/17/2022    Procedure: Left Heart Catheterization;  Surgeon: Manuel Mclain MD;  Location: [facility] CV    CV LEFT HEART CATHETERIZATION WITHOUT LEFT VENTRICULOGRAM Left 3/2/2021    Procedure: Left Heart Catheterization Without Left Ventriculogram;  Surgeon: Marivel Loo MD;  Location: [facility] Cardiac Cath Lab;  Service: Cardiology    CV PCI ANGIOPLASTY N/A 10/17/2022    Procedure: Percutaneous Coronary Intervention - Angioplasty;  Surgeon: Manuel Mclain MD;  Location: [facility] CV    CV PCI STENT DRUG ELUTING N/A 10/17/2022    Procedure: Percutaneous Coronary Intervention Stent;  Surgeon: Manuel Mclain MD;  Location: [facility] CV    FRACTURE SURGERY      HERNIA REPAIR      CT CATH PLMT L HRT & ARTS W/NJX & ANGIO IMG S&I Left 2017    Procedure: Left Heart Catheterization Without Left Ventriculogram;  Surgeon: Dandre Love MD;  Location: [facility] Cath Lab;  Service: Cardiology    RELEASE CARPAL TUNNEL Right        Allergies:  Allergies   Allergen Reactions    Brilinta [Ticagrelor] Shortness Of Breath    Lisinopril Cough       Family Hx:  No family history on file.    Social Hx:  Social History     Socioeconomic History    Marital status:      Spouse name: Not on file    Number of children: Not on file    Years of education: Not on file    Highest education level: Not on file   Occupational History    Not on file   Tobacco Use    Smoking status: Former     Current packs/day: 0.00     Average packs/day: 1 pack/day for 18.0 years (18.0 ttl pk-yrs)     Types: Cigarettes, Cigars     Start date:      Quit date:      Years since quittin.2     Passive exposure: Past (as a child)    Smokeless tobacco: Never    Tobacco comments:     still occasionally smokes cigars   Vaping Use    Vaping status: Never Used   Substance and Sexual Activity    Alcohol use: No    Drug use: Not Currently    Sexual activity: Not on file   Other Topics  Concern    Not on file   Social History Narrative    Not on file     Social Drivers of Health     Financial Resource Strain: Not on file   Food Insecurity: Not on file   Transportation Needs: Not on file   Physical Activity: Not on file   Stress: Not on file   Social Connections: Not on file   Interpersonal Safety: Not on file   Housing Stability: Not on file       Current Meds:  Current Outpatient Medications   Medication Sig Dispense Refill    aspirin (ASA) 81 MG EC tablet TAKE 1 TABLET (81 MG) BY MOUTH DAILY START TOMORROW. 90 tablet 2    atorvastatin (LIPITOR) 80 MG tablet Take 1 tablet (80 mg) by mouth daily 90 tablet 3    budesonide-formoterol (SYMBICORT/BREYNA) 160-4.5 MCG/ACT Inhaler Inhale 2 puffs into the lungs 2 times daily. 10.2 g 11    buPROPion (WELLBUTRIN SR) 100 MG 12 hr tablet Take 100 mg by mouth daily      isosorbide mononitrate (IMDUR) 30 MG 24 hr tablet Take 1 tablet (30 mg) by mouth daily. 90 tablet 0    metoprolol succinate ER (TOPROL XL) 25 MG 24 hr tablet TAKE 0.5 TABLETS BY MOUTH 2 TIMES DAILY. 90 tablet 3    omeprazole (PRILOSEC) 20 MG capsule [OMEPRAZOLE (PRILOSEC) 20 MG CAPSULE] Take 1 capsule (20 mg total) by mouth at bedtime. 30 capsule 0    polyvinyl alcohol/povidone (CLEAR EYES NATURAL TEARS OPHT) [POLYVINYL ALCOHOL/POVIDONE (CLEAR EYES NATURAL TEARS OPHT)] Apply 2 drops to eye 3 (three) times a day as needed.      ranolazine (RANEXA) 500 MG 12 hr tablet Take 1 tablet (500 mg) by mouth 2 times daily 180 tablet 3    sodium chloride (OCEAN) 0.65 % nasal spray [SODIUM CHLORIDE (OCEAN) 0.65 % NASAL SPRAY] Apply 2 sprays into each nostril as needed for congestion.      tadalafil (CIALIS) 20 MG tablet Take 1 tablet (20 mg) by mouth daily as needed 30 tablet 0          Physical Exam:     No VS for video visit       Data:     Labs:  reviewed    Imaging studies:  I have personally reviewed all pertinent imaging studies and PFT results unless otherwise noted.    No results found for this or  any previous visit (from the past 744 hours).      Pulmonary Function Testing    2/18/25:

## 2025-04-09 ENCOUNTER — MYC REFILL (OUTPATIENT)
Dept: PULMONOLOGY | Facility: CLINIC | Age: 55
End: 2025-04-09
Payer: COMMERCIAL

## 2025-04-09 DIAGNOSIS — J44.89 ASTHMA-COPD OVERLAP SYNDROME (H): ICD-10-CM

## 2025-04-10 RX ORDER — BUDESONIDE AND FORMOTEROL FUMARATE DIHYDRATE 160; 4.5 UG/1; UG/1
2 AEROSOL RESPIRATORY (INHALATION) 2 TIMES DAILY
Qty: 10.2 G | Refills: 11 | Status: SHIPPED | OUTPATIENT
Start: 2025-04-10

## 2025-04-16 ENCOUNTER — OFFICE VISIT (OUTPATIENT)
Dept: PHARMACY | Facility: PHYSICIAN GROUP | Age: 55
End: 2025-04-16
Payer: COMMERCIAL

## 2025-04-16 VITALS — BODY MASS INDEX: 34.72 KG/M2 | WEIGHT: 242 LBS

## 2025-04-16 DIAGNOSIS — G47.33 OSA (OBSTRUCTIVE SLEEP APNEA): ICD-10-CM

## 2025-04-16 DIAGNOSIS — E66.09 CLASS 1 OBESITY DUE TO EXCESS CALORIES WITHOUT SERIOUS COMORBIDITY WITH BODY MASS INDEX (BMI) OF 32.0 TO 32.9 IN ADULT: Primary | ICD-10-CM

## 2025-04-16 DIAGNOSIS — I25.83 CORONARY ARTERIOSCLEROSIS DUE TO LIPID RICH PLAQUE: ICD-10-CM

## 2025-04-16 DIAGNOSIS — R06.09 DOE (DYSPNEA ON EXERTION): ICD-10-CM

## 2025-04-16 DIAGNOSIS — E66.811 CLASS 1 OBESITY DUE TO EXCESS CALORIES WITHOUT SERIOUS COMORBIDITY WITH BODY MASS INDEX (BMI) OF 32.0 TO 32.9 IN ADULT: Primary | ICD-10-CM

## 2025-04-16 PROCEDURE — 99606 MTMS BY PHARM EST 15 MIN: CPT | Performed by: PHARMACIST

## 2025-04-16 PROCEDURE — 99607 MTMS BY PHARM ADDL 15 MIN: CPT | Performed by: PHARMACIST

## 2025-04-16 NOTE — PROGRESS NOTES
Medication Therapy Management (MTM) Encounter    ASSESSMENT:                            Medication Adherence/Access: See below for considerations.    Weight Management  would benefit from starting GLP-1 Wegovy.  Has CAD and IDA, would benefit from Wegovy. Needs to see what savings card would contribute towards costs and ask insurance about deductible. Pending sleep study Could also try Zepbound with IDA indication.   Wegovy savings card takes off $225/month and Zepbound card takes off up to $150/month    Hypertension /CAD/ stenting x 3  Blood pressure at goal <130/80 today, no changes.     Asthma   Stable. Managed by pulm.     PLAN:                            Give [facility] pharmacy the information for the Wegovy coupon card - this will tell you how much the coupon card takes off every month   Ask Judson if the price of Wegovy is expected to stay the same every month or get better throughout the year / if medicines go towards your deductible  If you want to try Wegovy before buying it, we do have 1 month of sample pens.   could try for Zepbound for sleep apnea if Wegovy is not covered     Follow-up: on Mychart when you are ready to start Wegovy, office visit 3-4 weeks after starting     SUBJECTIVE/OBJECTIVE:                          Taurus Cotto is a 54 year old male seen for a follow up visit. He was referred to me from Nita Ratliff.      Reason for visit: morbid obesity with CAD, IDA, Dyspnea on exertion    Allergies/ADRs: Reviewed in chart  Past Medical History: Reviewed in chart  Tobacco: He reports that he quit smoking about 18 years ago. His smoking use included cigarettes and cigars. He started smoking about 36 years ago. He has a 18 pack-year smoking history. He has been exposed to tobacco smoke. He has never used smokeless tobacco.  Alcohol: Less than 1 beverage / month    Medication Adherence/Access:   Does do [facility] mail order pharmacy for 90 days  Not sure if his plan has a deductible or not for  pharmacy or not  Deductible is $5000 - thinks he may be getting close     Weight Management     Heart attack hx: No: but stenting   Obstructive sleep apnea dx: Yes: sleep study in May  Test claims:   Wegovy $500 off $1300 so copay was down to $800  Zepbound $755 per month     Nutrition/Eating Habits: feels hungry all the time- has felt this way since he was a kid  Breakfast- peanut butter toast or shake  Lunch- leftovers / sandwich   Dinner - wife cooks- extra portion   Eating an extra meal later at night- carb craving. Chips/ veggie straws     Exercise/Activity: minimal  Medications Tried/Failed:  Bupropion: on currently for mood    Initial Consult Weight: 242 lb          Hypertension /CAD/ stenting x 3  -Aspirin 81 MG Tablet Delayed Release 1 tab(s) orally once a day.  -Atorvastatin Calcium 80 mg Tablet TAKE 1 TABLET DAILY.  -Isosorbide Mononitrate ER 30 MG Tablet Extended Release 24 Hour TAKE 1 TABLET BY MOUTH EVERY DAY Oral.  -Metoprolol Succinate ER 25 mg Tablet Extended Release 24 Hour TAKE 1/2 po twice daily   -Ranolazine ER 1000 MG Tablet Extended Release 12 Hour 1 tablet Orally Twice a day.    Follow with cardiology - 95% blockage   2x after a stress test  Patient reports no current medication side effects  Patient does not self-monitor blood pressure.         Asthma   -symbicort 160-4.5 mcg 2 puffs twice daily     Sees [facility] pulmonology   This is helping with symptoms, hard to tell quite how much but helping slightly. Was covered for $0 copay,             Today's Vitals: Wt 242 lb (109.8 kg)   BMI 34.72 kg/m    ----------------      I spent 25 minutes with this patient today. All changes were made via collaborative practice agreement with Nita Ratliff MD.     A summary of these recommendations was given to the patient.    Sade Engle, Pharm.D.  Medication Therapy Management Pharmacist           Medication Therapy Recommendations  Class 1 obesity due to excess calories without serious comorbidity  with body mass index (BMI) of 32.0 to 32.9 in adult   1 Rationale: Cannot afford medication product - Cost - Adherence   Recommendation: Referral to Service    Status: Resolved Med Access Issue   Identified Date: 4/16/2025 Completed Date: 4/16/2025   Note: wegovy coupon card

## 2025-04-16 NOTE — PATIENT INSTRUCTIONS
"Recommendations from today's MTM visit:                                                      Give [facility] pharmacy the information for the Wegovy coupon card - this will tell you how much the coupon card takes off every month   Ask Judson if the price of Wegovy is expected to stay the same every month or get better throughout the year / if medicines go towards your deductible  If you want to try Wegovy before buying it, we do have 1 month of sample pens.   could try for Zepbound for sleep apnea if Wegovy is not covered     Follow-up: when you decide on Wegovy     It was great speaking with you today.  I value your experience and would be very thankful for your time in providing feedback in our clinic survey. In the next few days, you may receive an email or text message from [facility] with a link to a survey related to your  clinical pharmacist.\"     To schedule another MTM appointment, please call the clinic directly or you may call [phone]    My Clinical Pharmacist's contact information:                                                      Please feel free to contact me with any questions or concerns you have. Use the patient portal - address to your provider with subject line \"to Sade\" or use [employer] to send me a message.      Sade Engle, Pharm.D.  Medication Therapy Management Pharmacist  [facility]  "

## 2025-04-17 DIAGNOSIS — E83.19 OTHER DISORDERS OF IRON METABOLISM: Primary | ICD-10-CM

## 2025-05-07 ENCOUNTER — MYC MEDICAL ADVICE (OUTPATIENT)
Dept: PHARMACY | Facility: PHYSICIAN GROUP | Age: 55
End: 2025-05-07
Payer: COMMERCIAL

## 2025-05-07 NOTE — PATIENT INSTRUCTIONS
Mr Taurus Cotto,  I enjoyed visiting with you again today.  I am glad to hear you are doing well.  Per our conversation let us get the stress MRI of the heart.  Also, let us hold the ZETIA and recheck the lipids in about 2 months.  I will plan on seeing you  October.  Masoud Albarran    Occlusion: No Scalp Incubation Time: 2 Hours Neck Incubation Time: 1 Hour Location: Face Frequency Of Pdt: Single Treatment Face Incubation Time: 2.5 Hours Pdt Type: JOHN-U Face And Scalp Incubation Time: 1 Hour for the face and 2 Hours for the scalp Detail Level: Zone Consent: The procedure and risks were reviewed with the patient including but not limited to: burning, pigmentary changes, pain, blistering, scabbing, redness, and the possibility of needing numerous treatments. Strict photoprotection after the procedure was also discussed. Photosensitizer: Levulan

## 2025-05-17 ENCOUNTER — MYC MEDICAL ADVICE (OUTPATIENT)
Dept: PHARMACY | Facility: PHYSICIAN GROUP | Age: 55
End: 2025-05-17
Payer: COMMERCIAL

## 2025-05-22 ENCOUNTER — MYC MEDICAL ADVICE (OUTPATIENT)
Dept: SLEEP MEDICINE | Facility: CLINIC | Age: 55
End: 2025-05-22
Payer: COMMERCIAL

## 2025-05-23 DIAGNOSIS — I20.89 CHRONIC STABLE ANGINA: ICD-10-CM

## 2025-05-23 DIAGNOSIS — I10 BENIGN ESSENTIAL HYPERTENSION: ICD-10-CM

## 2025-05-23 DIAGNOSIS — R06.09 DOE (DYSPNEA ON EXERTION): ICD-10-CM

## 2025-05-27 RX ORDER — ISOSORBIDE MONONITRATE 30 MG/1
30 TABLET, EXTENDED RELEASE ORAL DAILY
Qty: 90 TABLET | Refills: 0 | Status: SHIPPED | OUTPATIENT
Start: 2025-05-27

## 2025-05-28 ENCOUNTER — HOSPITAL ENCOUNTER (OUTPATIENT)
Dept: CT IMAGING | Facility: HOSPITAL | Age: 55
Discharge: HOME | End: 2025-05-28
Attending: NURSE PRACTITIONER
Payer: COMMERCIAL

## 2025-05-28 DIAGNOSIS — J44.89 ASTHMA-COPD OVERLAP SYNDROME (H): ICD-10-CM

## 2025-05-28 DIAGNOSIS — R06.02 SOB (SHORTNESS OF BREATH): ICD-10-CM

## 2025-05-28 DIAGNOSIS — R91.8 OPACITY OF LUNG ON IMAGING STUDY: ICD-10-CM

## 2025-05-28 PROCEDURE — 71250 CT THORAX DX C-: CPT

## 2025-05-29 ENCOUNTER — RESULTS FOLLOW-UP (OUTPATIENT)
Dept: PULMONOLOGY | Facility: CLINIC | Age: 55
End: 2025-05-29

## 2025-06-09 ENCOUNTER — MYC MEDICAL ADVICE (OUTPATIENT)
Dept: SLEEP MEDICINE | Facility: CLINIC | Age: 55
End: 2025-06-09
Payer: COMMERCIAL

## 2025-06-10 ENCOUNTER — RESULTS FOLLOW-UP (OUTPATIENT)
Dept: SLEEP MEDICINE | Facility: CLINIC | Age: 55
End: 2025-06-10
Payer: COMMERCIAL

## 2025-06-12 NOTE — TELEPHONE ENCOUNTER
Patient questions whether positional therapy alone could benefit her, I don't see the data in the report about side sleeping events.

## 2025-06-26 ENCOUNTER — MYC MEDICAL ADVICE (OUTPATIENT)
Dept: SLEEP MEDICINE | Facility: CLINIC | Age: 55
End: 2025-06-26
Payer: COMMERCIAL

## 2025-06-26 DIAGNOSIS — G47.33 OSA (OBSTRUCTIVE SLEEP APNEA): Primary | ICD-10-CM

## 2025-07-09 ENCOUNTER — VIRTUAL VISIT (OUTPATIENT)
Dept: PHARMACY | Facility: PHYSICIAN GROUP | Age: 55
End: 2025-07-09
Payer: COMMERCIAL

## 2025-07-09 DIAGNOSIS — E66.09 CLASS 1 OBESITY DUE TO EXCESS CALORIES WITHOUT SERIOUS COMORBIDITY WITH BODY MASS INDEX (BMI) OF 32.0 TO 32.9 IN ADULT: Primary | ICD-10-CM

## 2025-07-09 DIAGNOSIS — E66.811 CLASS 1 OBESITY DUE TO EXCESS CALORIES WITHOUT SERIOUS COMORBIDITY WITH BODY MASS INDEX (BMI) OF 32.0 TO 32.9 IN ADULT: Primary | ICD-10-CM

## 2025-07-09 DIAGNOSIS — G47.33 OSA (OBSTRUCTIVE SLEEP APNEA): ICD-10-CM

## 2025-07-09 PROCEDURE — 99606 MTMS BY PHARM EST 15 MIN: CPT | Mod: 93 | Performed by: PHARMACIST

## 2025-07-09 NOTE — PROGRESS NOTES
Medication Therapy Management (MTM) Encounter    ASSESSMENT:                            Medication Adherence/Access: see below     Weight Management  would benefit from naltrexone to help with cravings. Could trial dose increase to see if he finds additional benefit. Due for LFT recheck   Could increase bupropion dosing in the future to mimic Contrave product. May be ablle to get GLP-1 for better price pending insurance deductibles, Alec will work on this for next month.     PLAN:                             Lab tomorrow: CMP(checking LFTs)  Increase naltrexone 50 mg twice daily     Follow-up: 5 weeks     SUBJECTIVE/OBJECTIVE:                          Taurus Cotto is a 54 year old male seen for a follow up visit.     Reason for visit: naltrexone follow up     Allergies/ADRs: Reviewed in chart  Past Medical History: Reviewed in chart  Tobacco: He reports that he quit smoking about 18 years ago. His smoking use included cigarettes and cigars. He started smoking about 36 years ago. He has a 18 pack-year smoking history. He has been exposed to tobacco smoke. He has never used smokeless tobacco.  Alcohol: sober 20 years  Substance use: sober 20 years, previous drug user    Medication Adherence/Access:   Does do [facility] mail order pharmacy for 90 days- cheaper here than in [facility] pharmacies   Thinks he is close to his medical out of pocket deductible, may then Wegovy and Zepbound would be cheaper     Weight Management /IDA  -naltrexone 50 mg daily   Some stomach upset from last month, not sure if this is the med or not   Heart attack hx: No: but stenting   Obstructive sleep apnea dx: Yes: sleep study in May  Fatty liver: not that he is aware og  Test claims:   Wegovy $800 per month   Zepbound $755 per month     Nutrition/Eating Habits: feels hungry all the time- has felt this way since he was a kid  Craving salt, sugar, carbs - not sure naltrexone has helped with this   Did previously meet with a dietician - he knows what  he needs to eat, but struggles with addiction to food     Breakfast- peanut butter toast or shake  Lunch- leftovers / sandwich   Dinner - wife cooks- he grabs extra portion     Did go to "[employer] anonymous group in the past but now not going with schedule   Exercise/Activity: minimal  Medications Tried/Failed:  Bupropion: on currently for mood    Initial Consult Weight: 242 lb   Today- same   Medication Considerations:  Phentermine: positive for hypertension, positive cardiac history  Topiramate: No kidney stones, no fatigue or cognitive concerns  Naltrexone: No elevated LFT, - NAFLD, no chronic opiate use- has history of alcohol use   Bupropion: already taking                   Today's Vitals: There were no vitals taken for this visit.  ----------------      I spent 9 minutes with this patient today. All changes were made via collaborative practice agreement with Nita Ratliff MD.     A summary of these recommendations was sent via [employer] to the patient.    Sade Engle, Pharm.D.  Medication Therapy Management Pharmacist    Telemedicine Visit Details  The patient's medications can be safely assessed via a telemedicine encounter.  Type of service:  Telephone visit  Originating Location (pt. Location): Home    Distant Location (provider location):  On-site  Start Time: 8:04 AM  End Time: 8:13 AM           Medication Therapy Recommendations  Class 1 obesity due to excess calories without serious comorbidity with body mass index (BMI) of 32.0 to 32.9 in adult   1 Current Medication: naltrexone (DEPADE/REVIA) 50 MG tablet   Current Medication Sig: Take 50 mg by mouth 2 times daily.   Rationale: Dose too low - Dosage too low - Effectiveness   Recommendation: Increase Dose   Status: Accepted per CPA   Identified Date: 7/9/2025 Completed Date: 7/9/2025         2 Current Medication: naltrexone (DEPADE/REVIA) 50 MG tablet   Current Medication Sig: Take 50 mg by mouth 2 times daily.   Rationale: Medication requires  monitoring - Needs additional monitoring   Recommendation: Order Lab   Status: Accepted per CPA   Identified Date: 7/9/2025 Completed Date: 7/9/2025

## 2025-07-10 ENCOUNTER — LAB REQUISITION (OUTPATIENT)
Dept: LAB | Facility: CLINIC | Age: 55
End: 2025-07-10

## 2025-07-10 DIAGNOSIS — I10 ESSENTIAL (PRIMARY) HYPERTENSION: ICD-10-CM

## 2025-07-10 LAB
ALBUMIN SERPL BCG-MCNC: 4.4 G/DL (ref 3.5–5.2)
ALP SERPL-CCNC: 95 U/L (ref 40–150)
ALT SERPL W P-5'-P-CCNC: 37 U/L (ref 0–70)
ANION GAP SERPL CALCULATED.3IONS-SCNC: 11 MMOL/L (ref 7–15)
AST SERPL W P-5'-P-CCNC: 26 U/L (ref 0–45)
BILIRUB SERPL-MCNC: 0.9 MG/DL
BUN SERPL-MCNC: 22.8 MG/DL (ref 6–20)
CALCIUM SERPL-MCNC: 9.3 MG/DL (ref 8.8–10.4)
CHLORIDE SERPL-SCNC: 105 MMOL/L (ref 98–107)
CREAT SERPL-MCNC: 1.11 MG/DL (ref 0.67–1.17)
EGFRCR SERPLBLD CKD-EPI 2021: 78 ML/MIN/1.73M2
GLUCOSE SERPL-MCNC: 100 MG/DL (ref 70–99)
HCO3 SERPL-SCNC: 25 MMOL/L (ref 22–29)
POTASSIUM SERPL-SCNC: 4.7 MMOL/L (ref 3.4–5.3)
PROT SERPL-MCNC: 6.6 G/DL (ref 6.4–8.3)
SODIUM SERPL-SCNC: 141 MMOL/L (ref 135–145)

## 2025-07-10 PROCEDURE — 82040 ASSAY OF SERUM ALBUMIN: CPT | Performed by: FAMILY MEDICINE

## 2025-07-16 ASSESSMENT — ASTHMA QUESTIONNAIRES
QUESTION_1 LAST FOUR WEEKS HOW MUCH OF THE TIME DID YOUR ASTHMA KEEP YOU FROM GETTING AS MUCH DONE AT WORK, SCHOOL OR AT HOME: NONE OF THE TIME
QUESTION_2 LAST FOUR WEEKS HOW OFTEN HAVE YOU HAD SHORTNESS OF BREATH: ONCE A DAY
QUESTION_4 LAST FOUR WEEKS HOW OFTEN HAVE YOU USED YOUR RESCUE INHALER OR NEBULIZER MEDICATION (SUCH AS ALBUTEROL): TWO OR THREE TIMES PER WEEK
QUESTION_5 LAST FOUR WEEKS HOW WOULD YOU RATE YOUR ASTHMA CONTROL: WELL CONTROLLED
QUESTION_3 LAST FOUR WEEKS HOW OFTEN DID YOUR ASTHMA SYMPTOMS (WHEEZING, COUGHING, SHORTNESS OF BREATH, CHEST TIGHTNESS OR PAIN) WAKE YOU UP AT NIGHT OR EARLIER THAN USUAL IN THE MORNING: TWO OR THREE NIGHTS A WEEK
ACT_TOTALSCORE: 16

## 2025-07-17 ENCOUNTER — VIRTUAL VISIT (OUTPATIENT)
Dept: PULMONOLOGY | Facility: CLINIC | Age: 55
End: 2025-07-17
Attending: NURSE PRACTITIONER
Payer: COMMERCIAL

## 2025-07-17 DIAGNOSIS — R91.8 OPACITY OF LUNG ON IMAGING STUDY: ICD-10-CM

## 2025-07-17 DIAGNOSIS — J44.89 ASTHMA-COPD OVERLAP SYNDROME (H): Primary | ICD-10-CM

## 2025-07-17 DIAGNOSIS — R06.02 SOB (SHORTNESS OF BREATH): ICD-10-CM

## 2025-07-17 DIAGNOSIS — Z91.09 ENVIRONMENTAL ALLERGIES: ICD-10-CM

## 2025-07-17 ASSESSMENT — PAIN SCALES - GENERAL: PAINLEVEL_OUTOF10: NO PAIN (0)

## 2025-07-17 NOTE — NURSING NOTE
Current patient location: [address] [place] [id number]    Is the patient currently in the state of [place]? YES    Visit mode: VIDEO    If the visit is dropped, the patient can be reconnected by:VIDEO VISIT: Text to cell phone:   Telephone Information:   Mobile [phone]       Will anyone else be joining the visit? NO  (If patient encounters technical issues they should call [phone]    Are changes needed to the allergy or medication list? Pt stated no changes to allergies and Pt stated no med changes    Are refills needed on medications prescribed by this physician? NO    Rooming Documentation:  Questionnaire(s) completed    Reason for visit: RECHECK    Leno MENDEZF

## 2025-07-17 NOTE — PATIENT INSTRUCTIONS
Continue Symbicort two puffs twice daily, rinse/gargle after use.   You can use this as needed as well, max 12 puffs/day.   Continue Albuterol every 4-6 hours as needed for shortness of breath or wheezing.  Call [phone] with any change or worsening of your breathing, ask to speak to the lung clinic nurse.    Follow-up in 6 months.     Marimar Brasher CNP  Pulmonary Medicine  [facility]  [phone]

## 2025-07-17 NOTE — PROGRESS NOTES
Virtual Visit Details    Type of service:  Video Visit   Start time:  8:50am  End time:  9:03am  Originating Location (pt. Location): Home    Distant Location (provider location):  On-site  Platform used for Video Visit: [facility]         Pulmonary Clinic Follow-up          Assessment/Plan:     54 year old male with a history of COPD-Asthma overlap, untreated IDA, CAD s/p stent, HTN, GERD, HLD, bicuspid aortic valve - presenting for 3 month follow-up of shortness of breath.      COPD-Asthma overlap  Environmental allergies  Obstructive sleep apnea  Former smoker with 18 pack year history, quit in 2007.  He presented 2/2025 for evaluation of shortness of breath over the past year.  He does have a dry cough and noted wheezing when he is laying in bed at night.  He has a history of sleep apnea but it is currently untreated.   He established care with sleep medicine on 1/29/25, sleep study with moderate IDA and hypoxemia (AHI 16).    He has been evaluated by cardiology for SOB, echocardiogram 8/2024 was WNL.  Stress cardiac MRI with normal LV and RV size and function, EF 63%, no ischemia.   CXR clear in 7/2024.  Overread of cardiac MRI 5/2023 with normal lung parenchyma.  Eosinophils 0.3 in 7/2024.  He is concerned about environmental allergies, and he has allergy symptoms year round.   Pulmonary function testing with moderate airway obstruction (FEV1 64%) with bronchodilator response, air-trapping and hyperinflation, and diffusion capacity defect (DLCO 64%).   His airway obstruction may be due to his past smoking history, but he could also have overlapping features of reactive airways vs asthma, due to his reported history of year round allergies and bronchodilator response.    We started Symbicort, ordered a chest CT and blood work.  Chest CT with bands of GGOs and severe coronary artery calcifications.  Follow-up CT with stable bronchiectasis and faint GGOs in LLs.  Low reactivity to mold on allergen panel, with normal  IgE (16) and normal eosinophils (0.2).   He feels his breathing is improving with Symbicort.     Plan:  - continue Symbicort (budesonide/formoterol) 160/4.5, two puffs BID, rinse/gargle after use.  He can use this PRN as well, max 12 puffs/day.   - follow-up with sleep medicine on interventions for IDA.   - repeat chest CT based on symptoms, or in one year (due 5/2026).  - consider setting him up with nebulizer and Duonebs, if he exacerbates or next in person visit.   - stay active and slowly increase activity.  - he is UTD with COVID booster and annual influenza vaccine.  - Action plan: prednisone 40mg x5 days, + azithromycin x5 days.  Consider Duonebs.      Follow-up:  - 6 months      Marimar Brasher, CNP  Pulmonary Medicine  [facility]  [phone]       CC:     SOB follow-up     HPI:     54 year old male with a history of COPD-Asthma overlap, untreated IDA, CAD s/p stent, HTN, GERD, HLD, bicuspid aortic valve - presenting for 3 month follow-up of shortness of breath.      Breathing improved, can walk miles on flat surface.  Still struggling with stairs, and up hill walking.   Does wake up at night, but could be due to sleep apnea.  Working with sleep medicine on if he will get cpap or dental device/positional support.       Patient supplied answers from flow sheet for:  COPD Assessment Test (CAT)  2009 [employer]. All rights reserved.      3/28/2025    12:21 PM 7/16/2025     2:39 PM   COPD assessment test (CAT)   Cough 1 1   Phlegm 0 0   Chest tightness 3 2   Walk up hill 3 3   Limited activities 1 2   Leaving my home 0 1   Sleep 1 2   Energy 2 2   Total Score 11  13        Patient-reported      CAT Key:  The CAT consist of 8 items which are each scored 0-5. The total score ranges from 0-40 with higher scores representing a poorer health status. When interpreting CAT scores, the individual s disease severity should be considered.   Low impact  (1-9)  Medium impact  (10-20)  High  impact  (21-30)  Very high impact  (31-40)        3/28/2025    12:23 PM 7/16/2025     2:41 PM   ACT Total Scores   ACT TOTAL SCORE (Goal Greater than or Equal to 20) 18  16    In the past 12 months, how many times did you visit the emergency room for your asthma without being admitted to the hospital? 1 0   In the past 12 months, how many times were you hospitalized overnight because of your asthma? 0 0       Patient-reported        ROS:     A 4-system review was obtained and was negative with the exception of the symptoms endorsed in the HPI.       Medical history:       PMH:  Past Medical History:   Diagnosis Date    Asthma-COPD overlap syndrome (H)     Cardiac murmur     Coronary artery disease     GERD (gastroesophageal reflux disease)     High cholesterol     Hyperlipidemia     Hypertension        PSH:  Past Surgical History:   Procedure Laterality Date    CARDIAC CATHETERIZATION  07/31/2017    AUREA to distal RCA    CARDIAC CATHETERIZATION N/A 7/31/2017    Procedure: Coronary Angiogram;  Surgeon: Dandre Love MD;  Location: [facility] Cath Lab;  Service:     CORONARY STENT PLACEMENT      CV CORONARY ANGIOGRAM N/A 3/2/2021    Procedure: Coronary Angiogram;  Surgeon: Marivel Loo MD;  Location: [facility] Cardiac Cath Lab;  Service: Cardiology    CV CORONARY ANGIOGRAM N/A 10/17/2022    Procedure: Coronary Angiogram;  Surgeon: Manuel Mclain MD;  Location: [facility] CV    CV FRACTIONAL FLOW RATIO WIRE N/A 10/17/2022    Procedure: Fractional Flow Ratio Wire;  Surgeon: Manuel Mclain MD;  Location: [facility] LAB CV    CV LEFT HEART CATH N/A 10/17/2022    Procedure: Left Heart Catheterization;  Surgeon: Manuel Mclain MD;  Location: [facility] CATH LAB CV    CV LEFT HEART CATHETERIZATION WITHOUT LEFT VENTRICULOGRAM Left 3/2/2021    Procedure: Left Heart Catheterization Without Left Ventriculogram;  Surgeon: Marivel Loo MD;  Location: [facility] Cardiac Cath Lab;  Service:  Cardiology    CV PCI ANGIOPLASTY N/A 10/17/2022    Procedure: Percutaneous Coronary Intervention - Angioplasty;  Surgeon: Manuel Mclain MD;  Location: [facility] CV    CV PCI STENT DRUG ELUTING N/A 10/17/2022    Procedure: Percutaneous Coronary Intervention Stent;  Surgeon: Manuel Mclain MD;  Location: [facility] CV    FRACTURE SURGERY      HERNIA REPAIR      KS CATH PLMT L HRT & ARTS W/NJX & ANGIO IMG S&I Left 2017    Procedure: Left Heart Catheterization Without Left Ventriculogram;  Surgeon: Dandre Love MD;  Location: [facility] Cath Lab;  Service: Cardiology    RELEASE CARPAL TUNNEL Right        Allergies:  Allergies   Allergen Reactions    Brilinta [Ticagrelor] Shortness Of Breath    Lisinopril Cough       Family Hx:  No family history on file.    Social Hx:  Social History     Socioeconomic History    Marital status:      Spouse name: Not on file    Number of children: Not on file    Years of education: Not on file    Highest education level: Not on file   Occupational History    Not on file   Tobacco Use    Smoking status: Former     Current packs/day: 0.00     Average packs/day: 1 pack/day for 18.0 years (18.0 ttl pk-yrs)     Types: Cigarettes, Cigars     Start date:      Quit date:      Years since quittin.5     Passive exposure: Past (as a child)    Smokeless tobacco: Never   Vaping Use    Vaping status: Never Used   Substance and Sexual Activity    Alcohol use: No     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    Drug use: Not Currently    Sexual activity: Not on file   Other Topics Concern    Not on file   Social History Narrative    Not on file     Social Drivers of Health     Financial Resource Strain: Not on file   Food Insecurity: Not on file   Transportation Needs: Not on file   Physical Activity: Not on file   Stress: Not on file   Social Connections: Not on file   Interpersonal Safety: Not on file   Housing Stability: Not on file       Current  Meds:  Current Outpatient Medications   Medication Sig Dispense Refill    aspirin (ASA) 81 MG EC tablet TAKE 1 TABLET (81 MG) BY MOUTH DAILY START TOMORROW. 90 tablet 2    atorvastatin (LIPITOR) 80 MG tablet Take 1 tablet (80 mg) by mouth daily 90 tablet 3    budesonide-formoterol (SYMBICORT/BREYNA) 160-4.5 MCG/ACT Inhaler Inhale 2 puffs into the lungs 2 times daily. 10.2 g 11    buPROPion (WELLBUTRIN SR) 100 MG 12 hr tablet Take 100 mg by mouth daily      isosorbide mononitrate (IMDUR) 30 MG 24 hr tablet TAKE ONE TABLET BY MOUTH EVERY DAY 90 tablet 0    metoprolol succinate ER (TOPROL XL) 25 MG 24 hr tablet TAKE 0.5 TABLETS BY MOUTH 2 TIMES DAILY. 90 tablet 3    naltrexone (DEPADE/REVIA) 50 MG tablet Take 50 mg by mouth 2 times daily.      omeprazole (PRILOSEC) 20 MG capsule [OMEPRAZOLE (PRILOSEC) 20 MG CAPSULE] Take 1 capsule (20 mg total) by mouth at bedtime. 30 capsule 0    polyvinyl alcohol/povidone (CLEAR EYES NATURAL TEARS OPHT) [POLYVINYL ALCOHOL/POVIDONE (CLEAR EYES NATURAL TEARS OPHT)] Apply 2 drops to eye 3 (three) times a day as needed.      ranolazine (RANEXA) 500 MG 12 hr tablet TAKE ONE TABLET BY MOUTH TWICE A  tablet 3    sodium chloride (OCEAN) 0.65 % nasal spray [SODIUM CHLORIDE (OCEAN) 0.65 % NASAL SPRAY] Apply 2 sprays into each nostril as needed for congestion.      tadalafil (CIALIS) 20 MG tablet Take 1 tablet (20 mg) by mouth daily as needed 30 tablet 0          Physical Exam:     No VS for video visit       Data:     Labs:  reviewed    Imaging studies:  I have personally reviewed all pertinent imaging studies and PFT results unless otherwise noted.    No results found for this or any previous visit (from the past 744 hours).      Pulmonary Function Testing    2/18/25:

## 2025-07-28 ENCOUNTER — TELEPHONE (OUTPATIENT)
Dept: CARDIOLOGY | Facility: CLINIC | Age: 55
End: 2025-07-28
Payer: COMMERCIAL

## 2025-07-28 NOTE — TELEPHONE ENCOUNTER
Health Call Center    Phone Message    May a detailed message be left on voicemail: yes     Reason for Call: Brett with [employer] called stated the PA for the Ultrasound has been approved to the [facility].   Valid from 7/28/2025- 8/26/2025    Authorization #: [id number]    If any questions or concerns please call [phone]    Action Taken: Other: Cardio     Travel Screening: Not Applicable     Date of Service:                  Thank you!  [facility]

## 2025-07-29 NOTE — TELEPHONE ENCOUNTER
Noted- this is reflected in referral accurately. -[facility]        Referral Notes  Number of Notes: 2  .  Type Date User Summary Attachment   [facility] 07/28/2025 10:09 AM Ariane Gonzales Cardiac Services Final Note

## 2025-08-12 ENCOUNTER — HOSPITAL ENCOUNTER (OUTPATIENT)
Dept: CARDIOLOGY | Facility: CLINIC | Age: 55
Discharge: HOME | End: 2025-08-12
Attending: INTERNAL MEDICINE
Payer: COMMERCIAL

## 2025-08-12 DIAGNOSIS — R60.0 LEG EDEMA: ICD-10-CM

## 2025-08-12 DIAGNOSIS — I10 BENIGN ESSENTIAL HYPERTENSION: ICD-10-CM

## 2025-08-12 DIAGNOSIS — R06.09 DOE (DYSPNEA ON EXERTION): ICD-10-CM

## 2025-08-12 LAB — LVEF ECHO: NORMAL

## 2025-08-12 PROCEDURE — 93306 TTE W/DOPPLER COMPLETE: CPT

## 2025-08-12 PROCEDURE — 93306 TTE W/DOPPLER COMPLETE: CPT | Mod: 26 | Performed by: INTERNAL MEDICINE

## 2025-08-13 ENCOUNTER — VIRTUAL VISIT (OUTPATIENT)
Dept: PHARMACY | Facility: PHYSICIAN GROUP | Age: 55
End: 2025-08-13
Payer: COMMERCIAL

## 2025-08-13 DIAGNOSIS — G47.33 OSA (OBSTRUCTIVE SLEEP APNEA): ICD-10-CM

## 2025-08-13 DIAGNOSIS — E66.09 CLASS 1 OBESITY DUE TO EXCESS CALORIES WITHOUT SERIOUS COMORBIDITY WITH BODY MASS INDEX (BMI) OF 32.0 TO 32.9 IN ADULT: Primary | ICD-10-CM

## 2025-08-13 DIAGNOSIS — E66.811 CLASS 1 OBESITY DUE TO EXCESS CALORIES WITHOUT SERIOUS COMORBIDITY WITH BODY MASS INDEX (BMI) OF 32.0 TO 32.9 IN ADULT: Primary | ICD-10-CM

## 2025-08-13 DIAGNOSIS — I25.83 CORONARY ARTERIOSCLEROSIS DUE TO LIPID RICH PLAQUE: ICD-10-CM

## 2025-08-13 PROCEDURE — 99607 MTMS BY PHARM ADDL 15 MIN: CPT | Mod: 93 | Performed by: PHARMACIST

## 2025-08-13 PROCEDURE — 99606 MTMS BY PHARM EST 15 MIN: CPT | Mod: 93 | Performed by: PHARMACIST

## 2025-08-14 ENCOUNTER — OFFICE VISIT (OUTPATIENT)
Dept: CARDIOLOGY | Facility: CLINIC | Age: 55
End: 2025-08-14
Payer: COMMERCIAL

## 2025-08-14 VITALS
BODY MASS INDEX: 34.5 KG/M2 | RESPIRATION RATE: 16 BRPM | DIASTOLIC BLOOD PRESSURE: 80 MMHG | WEIGHT: 241 LBS | HEIGHT: 70 IN | HEART RATE: 61 BPM | SYSTOLIC BLOOD PRESSURE: 134 MMHG

## 2025-08-14 DIAGNOSIS — Q23.81 BICUSPID AORTIC VALVE: ICD-10-CM

## 2025-08-14 DIAGNOSIS — R60.0 LEG EDEMA: ICD-10-CM

## 2025-08-14 DIAGNOSIS — E78.2 MIXED HYPERLIPIDEMIA: ICD-10-CM

## 2025-08-14 DIAGNOSIS — R06.09 DOE (DYSPNEA ON EXERTION): ICD-10-CM

## 2025-08-14 DIAGNOSIS — I10 BENIGN ESSENTIAL HYPERTENSION: ICD-10-CM

## 2025-08-14 DIAGNOSIS — G47.33 OSA (OBSTRUCTIVE SLEEP APNEA): ICD-10-CM

## 2025-08-14 DIAGNOSIS — I25.83 CORONARY ARTERIOSCLEROSIS DUE TO LIPID RICH PLAQUE: Primary | ICD-10-CM

## 2025-08-14 DIAGNOSIS — E66.09 CLASS 1 OBESITY DUE TO EXCESS CALORIES WITHOUT SERIOUS COMORBIDITY WITH BODY MASS INDEX (BMI) OF 32.0 TO 32.9 IN ADULT: ICD-10-CM

## 2025-08-14 DIAGNOSIS — E66.811 CLASS 1 OBESITY DUE TO EXCESS CALORIES WITHOUT SERIOUS COMORBIDITY WITH BODY MASS INDEX (BMI) OF 32.0 TO 32.9 IN ADULT: ICD-10-CM

## 2025-08-15 ASSESSMENT — SLEEP AND FATIGUE QUESTIONNAIRES
HOW LIKELY ARE YOU TO NOD OFF OR FALL ASLEEP WHILE SITTING AND READING: SLIGHT CHANCE OF DOZING
HOW LIKELY ARE YOU TO NOD OFF OR FALL ASLEEP WHEN YOU ARE A PASSENGER IN A CAR FOR AN HOUR WITHOUT A BREAK: SLIGHT CHANCE OF DOZING
HOW LIKELY ARE YOU TO NOD OFF OR FALL ASLEEP WHILE LYING DOWN TO REST IN THE AFTERNOON WHEN CIRCUMSTANCES PERMIT: MODERATE CHANCE OF DOZING
HOW LIKELY ARE YOU TO NOD OFF OR FALL ASLEEP WHILE SITTING INACTIVE IN A PUBLIC PLACE: WOULD NEVER DOZE
HOW LIKELY ARE YOU TO NOD OFF OR FALL ASLEEP WHILE SITTING AND TALKING TO SOMEONE: WOULD NEVER DOZE
HOW LIKELY ARE YOU TO NOD OFF OR FALL ASLEEP IN A CAR, WHILE STOPPED FOR A FEW MINUTES IN TRAFFIC: WOULD NEVER DOZE
HOW LIKELY ARE YOU TO NOD OFF OR FALL ASLEEP WHILE WATCHING TV: SLIGHT CHANCE OF DOZING
HOW LIKELY ARE YOU TO NOD OFF OR FALL ASLEEP WHILE SITTING QUIETLY AFTER LUNCH WITHOUT ALCOHOL: SLIGHT CHANCE OF DOZING

## 2025-08-19 ENCOUNTER — MYC MEDICAL ADVICE (OUTPATIENT)
Dept: SLEEP MEDICINE | Facility: CLINIC | Age: 55
End: 2025-08-19

## 2025-08-19 ENCOUNTER — VIRTUAL VISIT (OUTPATIENT)
Dept: SLEEP MEDICINE | Facility: CLINIC | Age: 55
End: 2025-08-19
Payer: COMMERCIAL

## 2025-08-19 ENCOUNTER — MYC MEDICAL ADVICE (OUTPATIENT)
Dept: PHARMACY | Facility: PHYSICIAN GROUP | Age: 55
End: 2025-08-19

## 2025-08-19 VITALS — WEIGHT: 240 LBS | BODY MASS INDEX: 34.36 KG/M2 | HEIGHT: 70 IN

## 2025-08-19 DIAGNOSIS — E66.9 OBESITY WITH SLEEP APNEA: ICD-10-CM

## 2025-08-19 DIAGNOSIS — G47.30 OBESITY WITH SLEEP APNEA: ICD-10-CM

## 2025-08-19 DIAGNOSIS — G47.36 HYPOXEMIA ASSOCIATED WITH SLEEP: ICD-10-CM

## 2025-08-19 DIAGNOSIS — G47.33 OSA (OBSTRUCTIVE SLEEP APNEA): Primary | ICD-10-CM

## 2025-08-19 PROCEDURE — 1126F AMNT PAIN NOTED NONE PRSNT: CPT | Mod: 95 | Performed by: INTERNAL MEDICINE

## 2025-08-19 PROCEDURE — 98005 SYNCH AUDIO-VIDEO EST LOW 20: CPT | Performed by: INTERNAL MEDICINE

## 2025-08-19 ASSESSMENT — PAIN SCALES - GENERAL: PAINLEVEL_OUTOF10: NO PAIN (0)

## 2025-08-21 ENCOUNTER — RESULTS FOLLOW-UP (OUTPATIENT)
Dept: CARDIOLOGY | Facility: CLINIC | Age: 55
End: 2025-08-21

## 2025-08-21 ENCOUNTER — HOSPITAL ENCOUNTER (OUTPATIENT)
Dept: CARDIOLOGY | Facility: HOSPITAL | Age: 55
End: 2025-08-21
Attending: INTERNAL MEDICINE
Payer: COMMERCIAL

## 2025-08-21 PROCEDURE — 250N000011 HC RX IP 250 OP 636: Performed by: INTERNAL MEDICINE

## 2025-08-21 RX ORDER — REGADENOSON 0.08 MG/ML
0.4 INJECTION, SOLUTION INTRAVENOUS ONCE
Status: COMPLETED | OUTPATIENT
Start: 2025-08-21 | End: 2025-08-21

## 2025-08-21 RX ORDER — AMINOPHYLLINE 25 MG/ML
50-100 INJECTION, SOLUTION INTRAVENOUS
Status: ACTIVE | OUTPATIENT
Start: 2025-08-21

## 2025-08-21 RX ADMIN — REGADENOSON 0.4 MG: 0.08 INJECTION, SOLUTION INTRAVENOUS at 08:33

## 2025-09-04 ENCOUNTER — DOCUMENTATION ONLY (OUTPATIENT)
Dept: SLEEP MEDICINE | Facility: CLINIC | Age: 55
End: 2025-09-04
Payer: COMMERCIAL

## 2025-09-04 DIAGNOSIS — G47.33 OBSTRUCTIVE SLEEP APNEA (ADULT) (PEDIATRIC): Primary | ICD-10-CM

## (undated) DEVICE — CATH DIAGNOSTIC RADIAL 5FR TIG 4.0

## (undated) DEVICE — CATH LAUNCHER 6FR JR 4.0 LA6JR40

## (undated) DEVICE — SYR ANGIOGRAPHY MULTIUSE KIT ACIST 014612

## (undated) DEVICE — STENT CORONARY DES SYNERGY XD MR US 3.50X38MM H7493941838350: Type: IMPLANTABLE DEVICE | Status: NON-FUNCTIONAL

## (undated) DEVICE — 0.035IN X 260CM INQWIRE DIAGNOSTIC GUIDEWIRE, FIXED-CORE PTFE COATED, 3MM J-TIP

## (undated) DEVICE — MANIFOLD KIT ANGIO AUTOMATED 014613

## (undated) DEVICE — KIT HAND CONTROL ACIST 016795

## (undated) DEVICE — CATH BALLOON NC EMERGE 4.00X12MM H7493926712400

## (undated) DEVICE — CUSTOM PACK CORONARY SAN5BCRHEA

## (undated) DEVICE — SLEEVE TR BAND RADIAL COMPRESSION DEVICE 29CM XX-RF06L

## (undated) DEVICE — CATH BALLOON EMERGE 2.5X12MM H7493918912250

## (undated) DEVICE — DEVICE INFLATION SYR W/ HEMOSTASIS VALVE 12IN EXT IN4904

## (undated) DEVICE — GUIDEWIRE VASCULAR COMET II 185CML PRESSURE H74939359110

## (undated) DEVICE — VALVE HMSTS PHD SM BORE W/ SPR MTL INS TOOL TRQ DVC MAP802

## (undated) DEVICE — ELECTRODE ADULT PACING MULTI P-211-M1

## (undated) DEVICE — SHTH INTRO 0.021IN ID 6FR DIA

## (undated) RX ORDER — HEPARIN SODIUM 1000 [USP'U]/ML
INJECTION, SOLUTION INTRAVENOUS; SUBCUTANEOUS
Status: DISPENSED
Start: 2022-10-17

## (undated) RX ORDER — DIAZEPAM 5 MG
TABLET ORAL
Status: DISPENSED
Start: 2022-10-17

## (undated) RX ORDER — REGADENOSON 0.08 MG/ML
INJECTION, SOLUTION INTRAVENOUS
Status: DISPENSED
Start: 2024-08-07

## (undated) RX ORDER — FENTANYL CITRATE 50 UG/ML
INJECTION, SOLUTION INTRAMUSCULAR; INTRAVENOUS
Status: DISPENSED
Start: 2022-10-17

## (undated) RX ORDER — ADENOSINE 3 MG/ML
INJECTION, SOLUTION INTRAVENOUS
Status: DISPENSED
Start: 2022-10-17